# Patient Record
Sex: FEMALE | Race: WHITE | NOT HISPANIC OR LATINO | Employment: OTHER | URBAN - METROPOLITAN AREA
[De-identification: names, ages, dates, MRNs, and addresses within clinical notes are randomized per-mention and may not be internally consistent; named-entity substitution may affect disease eponyms.]

---

## 2021-01-01 ENCOUNTER — APPOINTMENT (EMERGENCY)
Dept: RADIOLOGY | Facility: HOSPITAL | Age: 86
DRG: 193 | End: 2021-01-01
Payer: COMMERCIAL

## 2021-01-01 ENCOUNTER — APPOINTMENT (INPATIENT)
Dept: RADIOLOGY | Facility: HOSPITAL | Age: 86
DRG: 193 | End: 2021-01-01
Payer: COMMERCIAL

## 2021-01-01 ENCOUNTER — HOSPITAL ENCOUNTER (INPATIENT)
Facility: HOSPITAL | Age: 86
LOS: 1 days | Discharge: HOME HEALTH | DRG: 193 | End: 2021-10-08
Attending: EMERGENCY MEDICINE | Admitting: INTERNAL MEDICINE
Payer: COMMERCIAL

## 2021-01-01 ENCOUNTER — APPOINTMENT (INPATIENT)
Dept: NON INVASIVE DIAGNOSTICS | Facility: HOSPITAL | Age: 86
DRG: 193 | End: 2021-01-01
Payer: COMMERCIAL

## 2021-01-01 ENCOUNTER — HOSPITAL ENCOUNTER (INPATIENT)
Facility: HOSPITAL | Age: 86
LOS: 4 days | Discharge: HOME HEALTH | DRG: 193 | End: 2021-11-12
Attending: EMERGENCY MEDICINE | Admitting: INTERNAL MEDICINE
Payer: COMMERCIAL

## 2021-01-01 VITALS
HEART RATE: 100 BPM | OXYGEN SATURATION: 92 % | DIASTOLIC BLOOD PRESSURE: 46 MMHG | SYSTOLIC BLOOD PRESSURE: 109 MMHG | TEMPERATURE: 97.8 F | WEIGHT: 90.17 LBS | RESPIRATION RATE: 18 BRPM | BODY MASS INDEX: 18.18 KG/M2 | HEIGHT: 59 IN

## 2021-01-01 VITALS
HEIGHT: 59 IN | RESPIRATION RATE: 16 BRPM | DIASTOLIC BLOOD PRESSURE: 55 MMHG | TEMPERATURE: 98.1 F | BODY MASS INDEX: 19.56 KG/M2 | OXYGEN SATURATION: 92 % | HEART RATE: 108 BPM | SYSTOLIC BLOOD PRESSURE: 109 MMHG | WEIGHT: 97 LBS

## 2021-01-01 DIAGNOSIS — E43 SEVERE PROTEIN-CALORIE MALNUTRITION (HCC): ICD-10-CM

## 2021-01-01 DIAGNOSIS — J18.9 LEFT LOWER LOBE PNEUMONIA: Primary | ICD-10-CM

## 2021-01-01 DIAGNOSIS — C85.90 LYMPHOMA, UNSPECIFIED BODY REGION, UNSPECIFIED LYMPHOMA TYPE (HCC): ICD-10-CM

## 2021-01-01 DIAGNOSIS — R09.02 HYPOXIA: ICD-10-CM

## 2021-01-01 DIAGNOSIS — E46 PROTEIN CALORIE MALNUTRITION (HCC): ICD-10-CM

## 2021-01-01 DIAGNOSIS — L89.152 DECUBITUS ULCER OF SACRAL REGION, STAGE 2 (HCC): ICD-10-CM

## 2021-01-01 DIAGNOSIS — J18.9 BILATERAL PNEUMONIA: Primary | ICD-10-CM

## 2021-01-01 LAB
2HR DELTA HS TROPONIN: 0 NG/L
4HR DELTA HS TROPONIN: -1 NG/L
ALBUMIN SERPL BCP-MCNC: 1.6 G/DL (ref 3.5–5)
ALBUMIN SERPL BCP-MCNC: 1.7 G/DL (ref 3.5–5)
ALBUMIN SERPL BCP-MCNC: 1.8 G/DL (ref 3.5–5)
ALBUMIN SERPL BCP-MCNC: 2.1 G/DL (ref 3.5–5)
ALP SERPL-CCNC: 134 U/L (ref 46–116)
ALP SERPL-CCNC: 136 U/L (ref 46–116)
ALP SERPL-CCNC: 158 U/L (ref 46–116)
ALP SERPL-CCNC: 165 U/L (ref 46–116)
ALT SERPL W P-5'-P-CCNC: 22 U/L (ref 12–78)
ALT SERPL W P-5'-P-CCNC: 28 U/L (ref 12–78)
ALT SERPL W P-5'-P-CCNC: 37 U/L (ref 12–78)
ALT SERPL W P-5'-P-CCNC: 49 U/L (ref 12–78)
ANION GAP SERPL CALCULATED.3IONS-SCNC: 10 MMOL/L (ref 4–13)
ANION GAP SERPL CALCULATED.3IONS-SCNC: 4 MMOL/L (ref 4–13)
ANION GAP SERPL CALCULATED.3IONS-SCNC: 5 MMOL/L (ref 4–13)
ANION GAP SERPL CALCULATED.3IONS-SCNC: 6 MMOL/L (ref 4–13)
ANION GAP SERPL CALCULATED.3IONS-SCNC: 6 MMOL/L (ref 4–13)
ANION GAP SERPL CALCULATED.3IONS-SCNC: 7 MMOL/L (ref 4–13)
ANION GAP SERPL CALCULATED.3IONS-SCNC: 9 MMOL/L (ref 4–13)
ANISOCYTOSIS BLD QL SMEAR: PRESENT
ANISOCYTOSIS BLD QL SMEAR: PRESENT
AORTIC ROOT: 3.2 CM
AORTIC VALVE MEAN VELOCITY: 11.3 M/S
APICAL FOUR CHAMBER EJECTION FRACTION: 56 %
APTT PPP: 32 SECONDS (ref 23–37)
APTT PPP: 37 SECONDS (ref 23–37)
AST SERPL W P-5'-P-CCNC: 26 U/L (ref 5–45)
AST SERPL W P-5'-P-CCNC: 32 U/L (ref 5–45)
AST SERPL W P-5'-P-CCNC: 47 U/L (ref 5–45)
AST SERPL W P-5'-P-CCNC: 55 U/L (ref 5–45)
ATRIAL RATE: 104 BPM
ATRIAL RATE: 104 BPM
ATRIAL RATE: 119 BPM
AV LVOT MEAN GRADIENT: 1 MMHG
AV LVOT PEAK GRADIENT: 3 MMHG
AV MEAN GRADIENT: 6 MMHG
AV PEAK GRADIENT: 11 MMHG
BACTERIA BLD CULT: NORMAL
BASOPHILS # BLD MANUAL: 0 THOUSAND/UL (ref 0–0.1)
BASOPHILS NFR MAR MANUAL: 0 % (ref 0–1)
BILIRUB SERPL-MCNC: 0.2 MG/DL (ref 0.2–1)
BILIRUB SERPL-MCNC: 0.2 MG/DL (ref 0.2–1)
BILIRUB SERPL-MCNC: 0.3 MG/DL (ref 0.2–1)
BILIRUB SERPL-MCNC: 0.4 MG/DL (ref 0.2–1)
BLASTS NFR BLD MANUAL: 1 %
BUN SERPL-MCNC: 10 MG/DL (ref 5–25)
BUN SERPL-MCNC: 10 MG/DL (ref 5–25)
BUN SERPL-MCNC: 12 MG/DL (ref 5–25)
BUN SERPL-MCNC: 14 MG/DL (ref 5–25)
BUN SERPL-MCNC: 17 MG/DL (ref 5–25)
CALCIUM ALBUM COR SERPL-MCNC: 8.5 MG/DL (ref 8.3–10.1)
CALCIUM ALBUM COR SERPL-MCNC: 8.7 MG/DL (ref 8.3–10.1)
CALCIUM ALBUM COR SERPL-MCNC: 9.3 MG/DL (ref 8.3–10.1)
CALCIUM ALBUM COR SERPL-MCNC: 9.4 MG/DL (ref 8.3–10.1)
CALCIUM SERPL-MCNC: 6.7 MG/DL (ref 8.3–10.1)
CALCIUM SERPL-MCNC: 7.2 MG/DL (ref 8.3–10.1)
CALCIUM SERPL-MCNC: 7.4 MG/DL (ref 8.3–10.1)
CALCIUM SERPL-MCNC: 7.6 MG/DL (ref 8.3–10.1)
CALCIUM SERPL-MCNC: 7.6 MG/DL (ref 8.3–10.1)
CALCIUM SERPL-MCNC: 7.7 MG/DL (ref 8.3–10.1)
CALCIUM SERPL-MCNC: 7.9 MG/DL (ref 8.3–10.1)
CARDIAC TROPONIN I PNL SERPL HS: 7 NG/L
CARDIAC TROPONIN I PNL SERPL HS: 8 NG/L
CARDIAC TROPONIN I PNL SERPL HS: 8 NG/L
CHLORIDE SERPL-SCNC: 100 MMOL/L (ref 100–108)
CHLORIDE SERPL-SCNC: 102 MMOL/L (ref 100–108)
CHLORIDE SERPL-SCNC: 103 MMOL/L (ref 100–108)
CHLORIDE SERPL-SCNC: 105 MMOL/L (ref 100–108)
CHLORIDE SERPL-SCNC: 99 MMOL/L (ref 100–108)
CO2 SERPL-SCNC: 25 MMOL/L (ref 21–32)
CO2 SERPL-SCNC: 25 MMOL/L (ref 21–32)
CO2 SERPL-SCNC: 26 MMOL/L (ref 21–32)
CO2 SERPL-SCNC: 28 MMOL/L (ref 21–32)
CO2 SERPL-SCNC: 29 MMOL/L (ref 21–32)
CREAT SERPL-MCNC: 0.38 MG/DL (ref 0.6–1.3)
CREAT SERPL-MCNC: 0.39 MG/DL (ref 0.6–1.3)
CREAT SERPL-MCNC: 0.4 MG/DL (ref 0.6–1.3)
CREAT SERPL-MCNC: 0.42 MG/DL (ref 0.6–1.3)
CREAT SERPL-MCNC: 0.47 MG/DL (ref 0.6–1.3)
CREAT SERPL-MCNC: 0.49 MG/DL (ref 0.6–1.3)
CREAT SERPL-MCNC: 0.54 MG/DL (ref 0.6–1.3)
DIFFERENTIAL COMMENT: ABNORMAL
DOP CALC AO VTI: 31 CM
DOP CALC LVOT PEAK VEL VTI: 14.9 CM
DOP CALC LVOT PEAK VEL: 0.82 M/S
E WAVE DECELERATION TIME: 187 MS
EOSINOPHIL # BLD MANUAL: 0 THOUSAND/UL (ref 0–0.4)
EOSINOPHIL NFR BLD MANUAL: 0 % (ref 0–6)
ERYTHROCYTE [DISTWIDTH] IN BLOOD BY AUTOMATED COUNT: 17.5 % (ref 11.6–15.1)
ERYTHROCYTE [DISTWIDTH] IN BLOOD BY AUTOMATED COUNT: 17.5 % (ref 11.6–15.1)
ERYTHROCYTE [DISTWIDTH] IN BLOOD BY AUTOMATED COUNT: 19.2 % (ref 11.6–15.1)
ERYTHROCYTE [DISTWIDTH] IN BLOOD BY AUTOMATED COUNT: 19.3 % (ref 11.6–15.1)
ERYTHROCYTE [DISTWIDTH] IN BLOOD BY AUTOMATED COUNT: 19.5 % (ref 11.6–15.1)
ERYTHROCYTE [DISTWIDTH] IN BLOOD BY AUTOMATED COUNT: 19.6 % (ref 11.6–15.1)
ERYTHROCYTE [DISTWIDTH] IN BLOOD BY AUTOMATED COUNT: 19.7 % (ref 11.6–15.1)
FERRITIN SERPL-MCNC: 458 NG/ML (ref 8–388)
FLUAV RNA RESP QL NAA+PROBE: NEGATIVE
FLUBV RNA RESP QL NAA+PROBE: NEGATIVE
FRACTIONAL SHORTENING: 35 % (ref 28–44)
GFR SERPL CREATININE-BSD FRML MDRD: 85 ML/MIN/1.73SQ M
GFR SERPL CREATININE-BSD FRML MDRD: 88 ML/MIN/1.73SQ M
GFR SERPL CREATININE-BSD FRML MDRD: 89 ML/MIN/1.73SQ M
GFR SERPL CREATININE-BSD FRML MDRD: 92 ML/MIN/1.73SQ M
GFR SERPL CREATININE-BSD FRML MDRD: 94 ML/MIN/1.73SQ M
GFR SERPL CREATININE-BSD FRML MDRD: 95 ML/MIN/1.73SQ M
GFR SERPL CREATININE-BSD FRML MDRD: 96 ML/MIN/1.73SQ M
GLUCOSE SERPL-MCNC: 106 MG/DL (ref 65–140)
GLUCOSE SERPL-MCNC: 107 MG/DL (ref 65–140)
GLUCOSE SERPL-MCNC: 80 MG/DL (ref 65–140)
GLUCOSE SERPL-MCNC: 80 MG/DL (ref 65–140)
GLUCOSE SERPL-MCNC: 85 MG/DL (ref 65–140)
GLUCOSE SERPL-MCNC: 87 MG/DL (ref 65–140)
GLUCOSE SERPL-MCNC: 89 MG/DL (ref 65–140)
HCT VFR BLD AUTO: 25.8 % (ref 34.8–46.1)
HCT VFR BLD AUTO: 26.2 % (ref 34.8–46.1)
HCT VFR BLD AUTO: 26.8 % (ref 34.8–46.1)
HCT VFR BLD AUTO: 26.9 % (ref 34.8–46.1)
HCT VFR BLD AUTO: 27.1 % (ref 34.8–46.1)
HCT VFR BLD AUTO: 27.7 % (ref 34.8–46.1)
HCT VFR BLD AUTO: 32.4 % (ref 34.8–46.1)
HEMOCCULT STL QL: NEGATIVE
HGB BLD-MCNC: 10.1 G/DL (ref 11.5–15.4)
HGB BLD-MCNC: 7.9 G/DL (ref 11.5–15.4)
HGB BLD-MCNC: 8 G/DL (ref 11.5–15.4)
HGB BLD-MCNC: 8.1 G/DL (ref 11.5–15.4)
HGB BLD-MCNC: 8.1 G/DL (ref 11.5–15.4)
HGB BLD-MCNC: 8.4 G/DL (ref 11.5–15.4)
HGB BLD-MCNC: 8.6 G/DL (ref 11.5–15.4)
INR PPP: 0.98 (ref 0.84–1.19)
INR PPP: 1.04 (ref 0.84–1.19)
INTERVENTRICULAR SEPTUM IN DIASTOLE (PARASTERNAL SHORT AXIS VIEW): 0.6 CM
IRON SATN MFR SERPL: 20 % (ref 15–50)
IRON SERPL-MCNC: 31 UG/DL (ref 50–170)
L PNEUMO1 AG UR QL IA.RAPID: NEGATIVE
LA/AORTA RATIO 2D: 0.91
LAAS-AP2: 17.1 CM2
LAAS-AP4: 13 CM2
LACTATE SERPL-SCNC: 1.1 MMOL/L (ref 0.5–2)
LACTATE SERPL-SCNC: 1.7 MMOL/L (ref 0.5–2)
LEFT INTERNAL DIMENSION IN SYSTOLE: 2.4 CM (ref 2.1–4)
LEFT VENTRICULAR INTERNAL DIMENSION IN DIASTOLE: 3.7 CM (ref 3.53–5.26)
LEFT VENTRICULAR POSTERIOR WALL IN END DIASTOLE: 0.5 CM
LEFT VENTRICULAR STROKE VOLUME: 37 ML
LYMPHOCYTES # BLD AUTO: 17.21 THOUSAND/UL (ref 0.6–4.47)
LYMPHOCYTES # BLD AUTO: 17.64 THOUSAND/UL (ref 0.6–4.47)
LYMPHOCYTES # BLD AUTO: 21.17 THOUSAND/UL (ref 0.6–4.47)
LYMPHOCYTES # BLD AUTO: 24.21 THOUSAND/UL (ref 0.6–4.47)
LYMPHOCYTES # BLD AUTO: 70 % (ref 14–44)
LYMPHOCYTES # BLD AUTO: 72 % (ref 14–44)
LYMPHOCYTES # BLD AUTO: 77 % (ref 14–44)
LYMPHOCYTES # BLD AUTO: 80 % (ref 14–44)
MCH RBC QN AUTO: 27.6 PG (ref 26.8–34.3)
MCH RBC QN AUTO: 27.7 PG (ref 26.8–34.3)
MCH RBC QN AUTO: 27.9 PG (ref 26.8–34.3)
MCH RBC QN AUTO: 28.1 PG (ref 26.8–34.3)
MCH RBC QN AUTO: 28.1 PG (ref 26.8–34.3)
MCH RBC QN AUTO: 28.2 PG (ref 26.8–34.3)
MCH RBC QN AUTO: 28.6 PG (ref 26.8–34.3)
MCHC RBC AUTO-ENTMCNC: 29.9 G/DL (ref 31.4–37.4)
MCHC RBC AUTO-ENTMCNC: 30.1 G/DL (ref 31.4–37.4)
MCHC RBC AUTO-ENTMCNC: 30.5 G/DL (ref 31.4–37.4)
MCHC RBC AUTO-ENTMCNC: 30.6 G/DL (ref 31.4–37.4)
MCHC RBC AUTO-ENTMCNC: 31 G/DL (ref 31.4–37.4)
MCHC RBC AUTO-ENTMCNC: 31.2 G/DL (ref 31.4–37.4)
MCHC RBC AUTO-ENTMCNC: 31.3 G/DL (ref 31.4–37.4)
MCV RBC AUTO: 89 FL (ref 82–98)
MCV RBC AUTO: 90 FL (ref 82–98)
MCV RBC AUTO: 92 FL (ref 82–98)
MCV RBC AUTO: 93 FL (ref 82–98)
MONOCYTES # BLD AUTO: 0 THOUSAND/UL (ref 0–1.22)
MONOCYTES # BLD AUTO: 0 THOUSAND/UL (ref 0–1.22)
MONOCYTES # BLD AUTO: 0.67 THOUSAND/UL (ref 0–1.22)
MONOCYTES # BLD AUTO: 0.82 THOUSAND/UL (ref 0–1.22)
MONOCYTES NFR BLD: 0 % (ref 4–12)
MONOCYTES NFR BLD: 0 % (ref 4–12)
MONOCYTES NFR BLD: 2 % (ref 4–12)
MONOCYTES NFR BLD: 3 % (ref 4–12)
MV E'TISSUE VEL-LAT: 11 CM/S
MV E'TISSUE VEL-SEP: 6 CM/S
MV PEAK A VEL: 1.16 M/S
MV PEAK E VEL: 83 CM/S
MV STENOSIS PRESSURE HALF TIME: 0 MS
NEUTROPHILS # BLD MANUAL: 4.37 THOUSAND/UL (ref 1.85–7.62)
NEUTROPHILS # BLD MANUAL: 4.41 THOUSAND/UL (ref 1.85–7.62)
NEUTROPHILS # BLD MANUAL: 5.22 THOUSAND/UL (ref 1.85–7.62)
NEUTROPHILS # BLD MANUAL: 6.39 THOUSAND/UL (ref 1.85–7.62)
NEUTS BAND NFR BLD MANUAL: 1 % (ref 0–8)
NEUTS SEG NFR BLD AUTO: 13 % (ref 43–75)
NEUTS SEG NFR BLD AUTO: 18 % (ref 43–75)
NEUTS SEG NFR BLD AUTO: 19 % (ref 43–75)
NEUTS SEG NFR BLD AUTO: 25 % (ref 43–75)
NRBC BLD AUTO-RTO: 0 /100 WBCS
NRBC BLD AUTO-RTO: 0 /100 WBCS
NT-PROBNP SERPL-MCNC: 1123 PG/ML
OVALOCYTES BLD QL SMEAR: PRESENT
P AXIS: 105 DEGREES
P AXIS: 77 DEGREES
P AXIS: 77 DEGREES
PATHOLOGIST INTERPRETATION: NORMAL
PATHOLOGY REVIEW: YES
PLATELET # BLD AUTO: 402 THOUSANDS/UL (ref 149–390)
PLATELET # BLD AUTO: 413 THOUSANDS/UL (ref 149–390)
PLATELET # BLD AUTO: 440 THOUSANDS/UL (ref 149–390)
PLATELET # BLD AUTO: 463 THOUSANDS/UL (ref 149–390)
PLATELET # BLD AUTO: 464 THOUSANDS/UL (ref 149–390)
PLATELET # BLD AUTO: 478 THOUSANDS/UL (ref 149–390)
PLATELET # BLD AUTO: 502 THOUSANDS/UL (ref 149–390)
PLATELET # BLD AUTO: 503 THOUSANDS/UL (ref 149–390)
PLATELET BLD QL SMEAR: ABNORMAL
PLATELET BLD QL SMEAR: ADEQUATE
PMV BLD AUTO: 8.4 FL (ref 8.9–12.7)
PMV BLD AUTO: 8.5 FL (ref 8.9–12.7)
PMV BLD AUTO: 8.5 FL (ref 8.9–12.7)
PMV BLD AUTO: 8.6 FL (ref 8.9–12.7)
PMV BLD AUTO: 8.7 FL (ref 8.9–12.7)
PMV BLD AUTO: 8.7 FL (ref 8.9–12.7)
PMV BLD AUTO: 8.8 FL (ref 8.9–12.7)
PMV BLD AUTO: 9.1 FL (ref 8.9–12.7)
POIKILOCYTOSIS BLD QL SMEAR: PRESENT
POLYCHROMASIA BLD QL SMEAR: PRESENT
POTASSIUM SERPL-SCNC: 3.4 MMOL/L (ref 3.5–5.3)
POTASSIUM SERPL-SCNC: 3.7 MMOL/L (ref 3.5–5.3)
POTASSIUM SERPL-SCNC: 3.8 MMOL/L (ref 3.5–5.3)
POTASSIUM SERPL-SCNC: 3.9 MMOL/L (ref 3.5–5.3)
POTASSIUM SERPL-SCNC: 3.9 MMOL/L (ref 3.5–5.3)
POTASSIUM SERPL-SCNC: 4.2 MMOL/L (ref 3.5–5.3)
POTASSIUM SERPL-SCNC: 4.4 MMOL/L (ref 3.5–5.3)
PR INTERVAL: 156 MS
PR INTERVAL: 156 MS
PR INTERVAL: 158 MS
PROCALCITONIN SERPL-MCNC: 0.15 NG/ML
PROCALCITONIN SERPL-MCNC: 0.17 NG/ML
PROCALCITONIN SERPL-MCNC: <0.05 NG/ML
PROT SERPL-MCNC: 3.8 G/DL (ref 6.4–8.2)
PROT SERPL-MCNC: 4.4 G/DL (ref 6.4–8.2)
PROT SERPL-MCNC: 4.6 G/DL (ref 6.4–8.2)
PROT SERPL-MCNC: 4.9 G/DL (ref 6.4–8.2)
PROTHROMBIN TIME: 12.9 SECONDS (ref 11.6–14.5)
PROTHROMBIN TIME: 13.5 SECONDS (ref 11.6–14.5)
QRS AXIS: 107 DEGREES
QRS AXIS: 74 DEGREES
QRS AXIS: 81 DEGREES
QRSD INTERVAL: 70 MS
QRSD INTERVAL: 76 MS
QRSD INTERVAL: 80 MS
QT INTERVAL: 304 MS
QT INTERVAL: 336 MS
QT INTERVAL: 338 MS
QTC INTERVAL: 427 MS
QTC INTERVAL: 441 MS
QTC INTERVAL: 444 MS
RA PRESSURE ESTIMATED: 3 MMHG
RBC # BLD AUTO: 2.81 MILLION/UL (ref 3.81–5.12)
RBC # BLD AUTO: 2.85 MILLION/UL (ref 3.81–5.12)
RBC # BLD AUTO: 2.9 MILLION/UL (ref 3.81–5.12)
RBC # BLD AUTO: 2.92 MILLION/UL (ref 3.81–5.12)
RBC # BLD AUTO: 2.98 MILLION/UL (ref 3.81–5.12)
RBC # BLD AUTO: 3.01 MILLION/UL (ref 3.81–5.12)
RBC # BLD AUTO: 3.66 MILLION/UL (ref 3.81–5.12)
RBC MORPH BLD: PRESENT
RBC MORPH BLD: PRESENT
RIGHT VENTRICLE ID DIMENSION: 3.2 CM
RSV RNA RESP QL NAA+PROBE: NEGATIVE
RV PSP: 23 MMHG
S PNEUM AG UR QL: NEGATIVE
SARS-COV-2 RNA RESP QL NAA+PROBE: NEGATIVE
SL CV LV EF: 60
SL CV PED ECHO LEFT VENTRICLE DIASTOLIC VOLUME (MOD BIPLANE) 2D: 58 ML
SL CV PED ECHO LEFT VENTRICLE SYSTOLIC VOLUME (MOD BIPLANE) 2D: 21 ML
SMUDGE CELLS BLD QL SMEAR: PRESENT
SMUDGE CELLS BLD QL SMEAR: PRESENT
SODIUM SERPL-SCNC: 133 MMOL/L (ref 136–145)
SODIUM SERPL-SCNC: 134 MMOL/L (ref 136–145)
SODIUM SERPL-SCNC: 134 MMOL/L (ref 136–145)
SODIUM SERPL-SCNC: 135 MMOL/L (ref 136–145)
SODIUM SERPL-SCNC: 135 MMOL/L (ref 136–145)
SODIUM SERPL-SCNC: 137 MMOL/L (ref 136–145)
SODIUM SERPL-SCNC: 137 MMOL/L (ref 136–145)
T WAVE AXIS: 104 DEGREES
T WAVE AXIS: 76 DEGREES
T WAVE AXIS: 77 DEGREES
TIBC SERPL-MCNC: 155 UG/DL (ref 250–450)
TOXIC GRANULES BLD QL SMEAR: PRESENT
TR MAX PG: 20 MMHG
TRICUSPID VALVE PEAK REGURGITATION VELOCITY: 2.21 M/S
TRICUSPID VALVE S': 52 CM/S
TROPONIN I SERPL-MCNC: <0.02 NG/ML
TSH SERPL DL<=0.05 MIU/L-ACNC: 2.75 UIU/ML (ref 0.36–3.74)
VARIANT LYMPHS # BLD AUTO: 1 %
VARIANT LYMPHS # BLD AUTO: 12 %
VARIANT LYMPHS # BLD AUTO: 4 %
VENTRICULAR RATE: 104 BPM
VENTRICULAR RATE: 104 BPM
VENTRICULAR RATE: 119 BPM
WBC # BLD AUTO: 17.14 THOUSAND/UL (ref 4.31–10.16)
WBC # BLD AUTO: 18.9 THOUSAND/UL (ref 4.31–10.16)
WBC # BLD AUTO: 22.05 THOUSAND/UL (ref 4.31–10.16)
WBC # BLD AUTO: 24.58 THOUSAND/UL (ref 4.31–10.16)
WBC # BLD AUTO: 25.52 THOUSAND/UL (ref 4.31–10.16)
WBC # BLD AUTO: 27.49 THOUSAND/UL (ref 4.31–10.16)
WBC # BLD AUTO: 33.63 THOUSAND/UL (ref 4.31–10.16)
Z-SCORE OF LEFT VENTRICULAR DIMENSION IN END SYSTOLE: -1.53

## 2021-01-01 PROCEDURE — 90662 IIV NO PRSV INCREASED AG IM: CPT | Performed by: PHYSICIAN ASSISTANT

## 2021-01-01 PROCEDURE — 99223 1ST HOSP IP/OBS HIGH 75: CPT | Performed by: INTERNAL MEDICINE

## 2021-01-01 PROCEDURE — 97167 OT EVAL HIGH COMPLEX 60 MIN: CPT

## 2021-01-01 PROCEDURE — 85049 AUTOMATED PLATELET COUNT: CPT | Performed by: INTERNAL MEDICINE

## 2021-01-01 PROCEDURE — 85027 COMPLETE CBC AUTOMATED: CPT | Performed by: EMERGENCY MEDICINE

## 2021-01-01 PROCEDURE — 93010 ELECTROCARDIOGRAM REPORT: CPT | Performed by: INTERNAL MEDICINE

## 2021-01-01 PROCEDURE — 80053 COMPREHEN METABOLIC PANEL: CPT | Performed by: PHYSICIAN ASSISTANT

## 2021-01-01 PROCEDURE — 85610 PROTHROMBIN TIME: CPT | Performed by: EMERGENCY MEDICINE

## 2021-01-01 PROCEDURE — 85027 COMPLETE CBC AUTOMATED: CPT | Performed by: HOSPITALIST

## 2021-01-01 PROCEDURE — 80048 BASIC METABOLIC PNL TOTAL CA: CPT | Performed by: HOSPITALIST

## 2021-01-01 PROCEDURE — 36415 COLL VENOUS BLD VENIPUNCTURE: CPT | Performed by: EMERGENCY MEDICINE

## 2021-01-01 PROCEDURE — 97163 PT EVAL HIGH COMPLEX 45 MIN: CPT

## 2021-01-01 PROCEDURE — 85025 COMPLETE CBC W/AUTO DIFF WBC: CPT | Performed by: HOSPITALIST

## 2021-01-01 PROCEDURE — 80053 COMPREHEN METABOLIC PANEL: CPT | Performed by: INTERNAL MEDICINE

## 2021-01-01 PROCEDURE — 85730 THROMBOPLASTIN TIME PARTIAL: CPT | Performed by: EMERGENCY MEDICINE

## 2021-01-01 PROCEDURE — 83605 ASSAY OF LACTIC ACID: CPT | Performed by: EMERGENCY MEDICINE

## 2021-01-01 PROCEDURE — 93306 TTE W/DOPPLER COMPLETE: CPT | Performed by: INTERNAL MEDICINE

## 2021-01-01 PROCEDURE — G0008 ADMIN INFLUENZA VIRUS VAC: HCPCS | Performed by: PHYSICIAN ASSISTANT

## 2021-01-01 PROCEDURE — 84145 PROCALCITONIN (PCT): CPT | Performed by: EMERGENCY MEDICINE

## 2021-01-01 PROCEDURE — 93005 ELECTROCARDIOGRAM TRACING: CPT

## 2021-01-01 PROCEDURE — 99291 CRITICAL CARE FIRST HOUR: CPT | Performed by: EMERGENCY MEDICINE

## 2021-01-01 PROCEDURE — 84484 ASSAY OF TROPONIN QUANT: CPT | Performed by: INTERNAL MEDICINE

## 2021-01-01 PROCEDURE — 94664 DEMO&/EVAL PT USE INHALER: CPT

## 2021-01-01 PROCEDURE — 85027 COMPLETE CBC AUTOMATED: CPT | Performed by: PHYSICIAN ASSISTANT

## 2021-01-01 PROCEDURE — 0241U HB NFCT DS VIR RESP RNA 4 TRGT: CPT | Performed by: EMERGENCY MEDICINE

## 2021-01-01 PROCEDURE — 71045 X-RAY EXAM CHEST 1 VIEW: CPT

## 2021-01-01 PROCEDURE — 99291 CRITICAL CARE FIRST HOUR: CPT

## 2021-01-01 PROCEDURE — 85007 BL SMEAR W/DIFF WBC COUNT: CPT | Performed by: EMERGENCY MEDICINE

## 2021-01-01 PROCEDURE — 84145 PROCALCITONIN (PCT): CPT | Performed by: INTERNAL MEDICINE

## 2021-01-01 PROCEDURE — 85027 COMPLETE CBC AUTOMATED: CPT | Performed by: INTERNAL MEDICINE

## 2021-01-01 PROCEDURE — 82728 ASSAY OF FERRITIN: CPT | Performed by: PHYSICIAN ASSISTANT

## 2021-01-01 PROCEDURE — 84484 ASSAY OF TROPONIN QUANT: CPT | Performed by: EMERGENCY MEDICINE

## 2021-01-01 PROCEDURE — 71046 X-RAY EXAM CHEST 2 VIEWS: CPT

## 2021-01-01 PROCEDURE — 96365 THER/PROPH/DIAG IV INF INIT: CPT

## 2021-01-01 PROCEDURE — 99233 SBSQ HOSP IP/OBS HIGH 50: CPT | Performed by: HOSPITALIST

## 2021-01-01 PROCEDURE — 84443 ASSAY THYROID STIM HORMONE: CPT | Performed by: PHYSICIAN ASSISTANT

## 2021-01-01 PROCEDURE — 80053 COMPREHEN METABOLIC PANEL: CPT | Performed by: EMERGENCY MEDICINE

## 2021-01-01 PROCEDURE — 99284 EMERGENCY DEPT VISIT MOD MDM: CPT | Performed by: EMERGENCY MEDICINE

## 2021-01-01 PROCEDURE — 99236 HOSP IP/OBS SAME DATE HI 85: CPT | Performed by: INTERNAL MEDICINE

## 2021-01-01 PROCEDURE — 99239 HOSP IP/OBS DSCHRG MGMT >30: CPT | Performed by: HOSPITALIST

## 2021-01-01 PROCEDURE — 99232 SBSQ HOSP IP/OBS MODERATE 35: CPT | Performed by: HOSPITALIST

## 2021-01-01 PROCEDURE — 93306 TTE W/DOPPLER COMPLETE: CPT

## 2021-01-01 PROCEDURE — 82272 OCCULT BLD FECES 1-3 TESTS: CPT | Performed by: INTERNAL MEDICINE

## 2021-01-01 PROCEDURE — 97530 THERAPEUTIC ACTIVITIES: CPT

## 2021-01-01 PROCEDURE — 83880 ASSAY OF NATRIURETIC PEPTIDE: CPT | Performed by: EMERGENCY MEDICINE

## 2021-01-01 PROCEDURE — 83540 ASSAY OF IRON: CPT | Performed by: PHYSICIAN ASSISTANT

## 2021-01-01 PROCEDURE — 99285 EMERGENCY DEPT VISIT HI MDM: CPT

## 2021-01-01 PROCEDURE — 85007 BL SMEAR W/DIFF WBC COUNT: CPT | Performed by: PHYSICIAN ASSISTANT

## 2021-01-01 PROCEDURE — 87040 BLOOD CULTURE FOR BACTERIA: CPT | Performed by: EMERGENCY MEDICINE

## 2021-01-01 PROCEDURE — 94760 N-INVAS EAR/PLS OXIMETRY 1: CPT

## 2021-01-01 PROCEDURE — 85007 BL SMEAR W/DIFF WBC COUNT: CPT | Performed by: INTERNAL MEDICINE

## 2021-01-01 PROCEDURE — 36415 COLL VENOUS BLD VENIPUNCTURE: CPT | Performed by: INTERNAL MEDICINE

## 2021-01-01 PROCEDURE — 82272 OCCULT BLD FECES 1-3 TESTS: CPT

## 2021-01-01 PROCEDURE — 87449 NOS EACH ORGANISM AG IA: CPT | Performed by: EMERGENCY MEDICINE

## 2021-01-01 PROCEDURE — 83550 IRON BINDING TEST: CPT | Performed by: PHYSICIAN ASSISTANT

## 2021-01-01 RX ORDER — GUAIFENESIN 600 MG
600 TABLET, EXTENDED RELEASE 12 HR ORAL 2 TIMES DAILY
Status: DISCONTINUED | OUTPATIENT
Start: 2021-01-01 | End: 2021-01-01 | Stop reason: HOSPADM

## 2021-01-01 RX ORDER — LEVALBUTEROL 1.25 MG/.5ML
1.25 SOLUTION, CONCENTRATE RESPIRATORY (INHALATION) EVERY 6 HOURS PRN
Status: DISCONTINUED | OUTPATIENT
Start: 2021-01-01 | End: 2021-01-01 | Stop reason: HOSPADM

## 2021-01-01 RX ORDER — CEFTRIAXONE 1 G/50ML
1000 INJECTION, SOLUTION INTRAVENOUS EVERY 24 HOURS
Status: DISCONTINUED | OUTPATIENT
Start: 2021-01-01 | End: 2021-01-01

## 2021-01-01 RX ORDER — ACETAMINOPHEN 325 MG/1
650 TABLET ORAL EVERY 6 HOURS PRN
Status: DISCONTINUED | OUTPATIENT
Start: 2021-01-01 | End: 2021-01-01 | Stop reason: HOSPADM

## 2021-01-01 RX ORDER — CEFTRIAXONE 1 G/50ML
1000 INJECTION, SOLUTION INTRAVENOUS ONCE
Status: COMPLETED | OUTPATIENT
Start: 2021-01-01 | End: 2021-01-01

## 2021-01-01 RX ORDER — CEFTRIAXONE 2 G/50ML
2000 INJECTION, SOLUTION INTRAVENOUS ONCE
Status: COMPLETED | OUTPATIENT
Start: 2021-01-01 | End: 2021-01-01

## 2021-01-01 RX ORDER — VANCOMYCIN HYDROCHLORIDE 500 MG/100ML
500 INJECTION, SOLUTION INTRAVENOUS EVERY 12 HOURS
Status: DISCONTINUED | OUTPATIENT
Start: 2021-01-01 | End: 2021-01-01

## 2021-01-01 RX ORDER — MULTIVITAMIN/IRON/FOLIC ACID 18MG-0.4MG
1 TABLET ORAL DAILY
Status: DISCONTINUED | OUTPATIENT
Start: 2021-01-01 | End: 2021-01-01 | Stop reason: HOSPADM

## 2021-01-01 RX ORDER — CALCIUM CARBONATE 200(500)MG
1000 TABLET,CHEWABLE ORAL DAILY PRN
Status: DISCONTINUED | OUTPATIENT
Start: 2021-01-01 | End: 2021-01-01 | Stop reason: HOSPADM

## 2021-01-01 RX ORDER — CEFDINIR 300 MG/1
300 CAPSULE ORAL EVERY 12 HOURS SCHEDULED
Status: DISCONTINUED | OUTPATIENT
Start: 2021-01-01 | End: 2021-01-01 | Stop reason: HOSPADM

## 2021-01-01 RX ORDER — MULTIVITAMIN
1 CAPSULE ORAL DAILY
COMMUNITY

## 2021-01-01 RX ORDER — LEVOTHYROXINE SODIUM 0.07 MG/1
37.5 TABLET ORAL
Status: DISCONTINUED | OUTPATIENT
Start: 2021-01-01 | End: 2021-01-01 | Stop reason: HOSPADM

## 2021-01-01 RX ORDER — B-COMPLEX WITH VITAMIN C
500 TABLET ORAL
COMMUNITY

## 2021-01-01 RX ORDER — POTASSIUM CHLORIDE 20 MEQ/1
20 TABLET, EXTENDED RELEASE ORAL ONCE
Status: COMPLETED | OUTPATIENT
Start: 2021-01-01 | End: 2021-01-01

## 2021-01-01 RX ORDER — SODIUM CHLORIDE FOR INHALATION 0.9 %
3 VIAL, NEBULIZER (ML) INHALATION EVERY 6 HOURS PRN
Status: DISCONTINUED | OUTPATIENT
Start: 2021-01-01 | End: 2021-01-01 | Stop reason: HOSPADM

## 2021-01-01 RX ORDER — ATORVASTATIN CALCIUM 20 MG/1
20 TABLET, FILM COATED ORAL
COMMUNITY
Start: 2021-01-01

## 2021-01-01 RX ORDER — AMOXICILLIN 250 MG
1 CAPSULE ORAL DAILY
Status: DISCONTINUED | OUTPATIENT
Start: 2021-01-01 | End: 2021-01-01 | Stop reason: HOSPADM

## 2021-01-01 RX ORDER — LEVOTHYROXINE SODIUM 0.03 MG/1
25 TABLET ORAL
Status: DISCONTINUED | OUTPATIENT
Start: 2021-01-01 | End: 2021-01-01 | Stop reason: HOSPADM

## 2021-01-01 RX ORDER — MIRTAZAPINE 7.5 MG/1
15 TABLET, FILM COATED ORAL
COMMUNITY

## 2021-01-01 RX ORDER — AMOXICILLIN 250 MG
1 CAPSULE ORAL DAILY
COMMUNITY

## 2021-01-01 RX ORDER — ONDANSETRON 2 MG/ML
4 INJECTION INTRAMUSCULAR; INTRAVENOUS EVERY 6 HOURS PRN
Status: DISCONTINUED | OUTPATIENT
Start: 2021-01-01 | End: 2021-01-01 | Stop reason: HOSPADM

## 2021-01-01 RX ORDER — ESCITALOPRAM OXALATE 5 MG/1
5 TABLET ORAL DAILY
Status: DISCONTINUED | OUTPATIENT
Start: 2021-01-01 | End: 2021-01-01 | Stop reason: HOSPADM

## 2021-01-01 RX ORDER — CEFTRIAXONE 1 G/50ML
1000 INJECTION, SOLUTION INTRAVENOUS EVERY 24 HOURS
Status: DISCONTINUED | OUTPATIENT
Start: 2021-01-01 | End: 2021-01-01 | Stop reason: HOSPADM

## 2021-01-01 RX ORDER — SERTRALINE HYDROCHLORIDE 25 MG/1
25 TABLET, FILM COATED ORAL DAILY
COMMUNITY

## 2021-01-01 RX ORDER — MIRTAZAPINE 15 MG/1
7.5 TABLET, FILM COATED ORAL
Status: DISCONTINUED | OUTPATIENT
Start: 2021-01-01 | End: 2021-01-01 | Stop reason: HOSPADM

## 2021-01-01 RX ORDER — ATORVASTATIN CALCIUM 20 MG/1
20 TABLET, FILM COATED ORAL
Status: DISCONTINUED | OUTPATIENT
Start: 2021-01-01 | End: 2021-01-01 | Stop reason: HOSPADM

## 2021-01-01 RX ORDER — CEFEPIME HYDROCHLORIDE 2 G/50ML
2000 INJECTION, SOLUTION INTRAVENOUS EVERY 12 HOURS
Status: DISCONTINUED | OUTPATIENT
Start: 2021-01-01 | End: 2021-01-01

## 2021-01-01 RX ORDER — CEFUROXIME AXETIL 250 MG/1
250 TABLET ORAL EVERY 12 HOURS SCHEDULED
Qty: 12 TABLET | Refills: 0 | Status: SHIPPED | OUTPATIENT
Start: 2021-01-01 | End: 2021-01-01

## 2021-01-01 RX ORDER — ACETAMINOPHEN 325 MG/1
650 TABLET ORAL ONCE
Status: COMPLETED | OUTPATIENT
Start: 2021-01-01 | End: 2021-01-01

## 2021-01-01 RX ORDER — FUROSEMIDE 10 MG/ML
20 INJECTION INTRAMUSCULAR; INTRAVENOUS ONCE
Status: COMPLETED | OUTPATIENT
Start: 2021-01-01 | End: 2021-01-01

## 2021-01-01 RX ORDER — CEFDINIR 300 MG/1
300 CAPSULE ORAL EVERY 12 HOURS SCHEDULED
Qty: 10 CAPSULE | Refills: 0 | Status: SHIPPED | OUTPATIENT
Start: 2021-01-01 | End: 2021-01-01

## 2021-01-01 RX ORDER — FERROUS SULFATE 325(65) MG
325 TABLET ORAL
COMMUNITY

## 2021-01-01 RX ORDER — FERROUS SULFATE 325(65) MG
325 TABLET ORAL
Status: DISCONTINUED | OUTPATIENT
Start: 2021-01-01 | End: 2021-01-01 | Stop reason: HOSPADM

## 2021-01-01 RX ORDER — FUROSEMIDE 10 MG/ML
20 INJECTION INTRAMUSCULAR; INTRAVENOUS DAILY
Status: DISCONTINUED | OUTPATIENT
Start: 2021-01-01 | End: 2021-01-01

## 2021-01-01 RX ORDER — ESCITALOPRAM OXALATE 5 MG/1
TABLET ORAL
COMMUNITY
Start: 2021-01-01 | End: 2022-01-01

## 2021-01-01 RX ORDER — B-COMPLEX WITH VITAMIN C
1 TABLET ORAL
Status: DISCONTINUED | OUTPATIENT
Start: 2021-01-01 | End: 2021-01-01 | Stop reason: HOSPADM

## 2021-01-01 RX ORDER — ALBUTEROL SULFATE 90 UG/1
2 AEROSOL, METERED RESPIRATORY (INHALATION) ONCE
Status: COMPLETED | OUTPATIENT
Start: 2021-01-01 | End: 2021-01-01

## 2021-01-01 RX ORDER — LEVOTHYROXINE SODIUM 0.03 MG/1
37.5 TABLET ORAL
COMMUNITY
Start: 2021-01-01

## 2021-01-01 RX ADMIN — MIRTAZAPINE 7.5 MG: 15 TABLET, FILM COATED ORAL at 21:48

## 2021-01-01 RX ADMIN — FERROUS SULFATE TAB 325 MG (65 MG ELEMENTAL FE) 325 MG: 325 (65 FE) TAB at 09:01

## 2021-01-01 RX ADMIN — GUAIFENESIN 600 MG: 600 TABLET ORAL at 18:44

## 2021-01-01 RX ADMIN — ENOXAPARIN SODIUM 40 MG: 100 INJECTION SUBCUTANEOUS at 09:51

## 2021-01-01 RX ADMIN — GUAIFENESIN 600 MG: 600 TABLET ORAL at 17:11

## 2021-01-01 RX ADMIN — FERROUS SULFATE TAB 325 MG (65 MG ELEMENTAL FE) 325 MG: 325 (65 FE) TAB at 08:12

## 2021-01-01 RX ADMIN — GUAIFENESIN 600 MG: 600 TABLET ORAL at 17:34

## 2021-01-01 RX ADMIN — ENOXAPARIN SODIUM 30 MG: 100 INJECTION SUBCUTANEOUS at 09:01

## 2021-01-01 RX ADMIN — MIRTAZAPINE 7.5 MG: 15 TABLET, FILM COATED ORAL at 21:00

## 2021-01-01 RX ADMIN — FUROSEMIDE 20 MG: 10 INJECTION, SOLUTION INTRAMUSCULAR; INTRAVENOUS at 09:51

## 2021-01-01 RX ADMIN — FUROSEMIDE 20 MG: 10 INJECTION, SOLUTION INTRAMUSCULAR; INTRAVENOUS at 20:55

## 2021-01-01 RX ADMIN — ENOXAPARIN SODIUM 40 MG: 100 INJECTION SUBCUTANEOUS at 08:11

## 2021-01-01 RX ADMIN — ATORVASTATIN CALCIUM 20 MG: 20 TABLET, FILM COATED ORAL at 16:01

## 2021-01-01 RX ADMIN — DOCUSATE SODIUM AND SENNOSIDES 1 TABLET: 8.6; 5 TABLET ORAL at 09:51

## 2021-01-01 RX ADMIN — Medication 30 MG: at 16:02

## 2021-01-01 RX ADMIN — MULTIPLE VITAMINS W/ MINERALS TAB 1 TABLET: TAB ORAL at 08:10

## 2021-01-01 RX ADMIN — Medication 1 TABLET: at 07:05

## 2021-01-01 RX ADMIN — LEVOTHYROXINE SODIUM 37.5 MCG: 75 TABLET ORAL at 05:28

## 2021-01-01 RX ADMIN — GUAIFENESIN 600 MG: 600 TABLET ORAL at 09:51

## 2021-01-01 RX ADMIN — POTASSIUM CHLORIDE 20 MEQ: 1500 TABLET, EXTENDED RELEASE ORAL at 15:35

## 2021-01-01 RX ADMIN — ACETAMINOPHEN 650 MG: 325 TABLET, FILM COATED ORAL at 22:51

## 2021-01-01 RX ADMIN — MIRTAZAPINE 7.5 MG: 15 TABLET, FILM COATED ORAL at 22:48

## 2021-01-01 RX ADMIN — ATORVASTATIN CALCIUM 20 MG: 20 TABLET, FILM COATED ORAL at 17:11

## 2021-01-01 RX ADMIN — ESCITALOPRAM 5 MG: 5 TABLET, FILM COATED ORAL at 08:13

## 2021-01-01 RX ADMIN — VANCOMYCIN HYDROCHLORIDE 500 MG: 500 INJECTION, SOLUTION INTRAVENOUS at 08:47

## 2021-01-01 RX ADMIN — ESCITALOPRAM 5 MG: 5 TABLET, FILM COATED ORAL at 09:51

## 2021-01-01 RX ADMIN — INFLUENZA A VIRUS A/VICTORIA/2570/2019 IVR-215 (H1N1) ANTIGEN (FORMALDEHYDE INACTIVATED), INFLUENZA A VIRUS A/TASMANIA/503/2020 IVR-221 (H3N2) ANTIGEN (FORMALDEHYDE INACTIVATED), INFLUENZA B VIRUS B/PHUKET/3073/2013 ANTIGEN (FORMALDEHYDE INACTIVATED), AND INFLUENZA B VIRUS B/WASHINGTON/02/2019 ANTIGEN (FORMALDEHYDE INACTIVATED) 0.7 ML: 60; 60; 60; 60 INJECTION, SUSPENSION INTRAMUSCULAR at 16:02

## 2021-01-01 RX ADMIN — SODIUM CHLORIDE 1000 ML: 0.9 INJECTION, SOLUTION INTRAVENOUS at 23:41

## 2021-01-01 RX ADMIN — DOCUSATE SODIUM AND SENNOSIDES 1 TABLET: 8.6; 5 TABLET ORAL at 09:01

## 2021-01-01 RX ADMIN — MIRTAZAPINE 7.5 MG: 15 TABLET, FILM COATED ORAL at 01:11

## 2021-01-01 RX ADMIN — GUAIFENESIN 600 MG: 600 TABLET ORAL at 08:01

## 2021-01-01 RX ADMIN — MIRTAZAPINE 7.5 MG: 15 TABLET, FILM COATED ORAL at 21:57

## 2021-01-01 RX ADMIN — CEFDINIR 300 MG: 300 CAPSULE ORAL at 13:16

## 2021-01-01 RX ADMIN — FERROUS SULFATE TAB 325 MG (65 MG ELEMENTAL FE) 325 MG: 325 (65 FE) TAB at 07:05

## 2021-01-01 RX ADMIN — CEFTRIAXONE 1000 MG: 1 INJECTION, SOLUTION INTRAVENOUS at 20:49

## 2021-01-01 RX ADMIN — LEVOTHYROXINE SODIUM 37.5 MCG: 75 TABLET ORAL at 05:26

## 2021-01-01 RX ADMIN — ALBUTEROL SULFATE 2 PUFF: 90 AEROSOL, METERED RESPIRATORY (INHALATION) at 20:19

## 2021-01-01 RX ADMIN — GUAIFENESIN 600 MG: 600 TABLET ORAL at 08:10

## 2021-01-01 RX ADMIN — Medication 1 TABLET: at 06:59

## 2021-01-01 RX ADMIN — CEFTRIAXONE 1000 MG: 1 INJECTION, SOLUTION INTRAVENOUS at 22:00

## 2021-01-01 RX ADMIN — CEFTRIAXONE 1000 MG: 1 INJECTION, SOLUTION INTRAVENOUS at 20:19

## 2021-01-01 RX ADMIN — FERROUS SULFATE TAB 325 MG (65 MG ELEMENTAL FE) 325 MG: 325 (65 FE) TAB at 05:27

## 2021-01-01 RX ADMIN — ATORVASTATIN CALCIUM 20 MG: 20 TABLET, FILM COATED ORAL at 16:39

## 2021-01-01 RX ADMIN — ENOXAPARIN SODIUM 40 MG: 100 INJECTION SUBCUTANEOUS at 08:01

## 2021-01-01 RX ADMIN — ESCITALOPRAM 5 MG: 5 TABLET, FILM COATED ORAL at 09:01

## 2021-01-01 RX ADMIN — ATORVASTATIN CALCIUM 20 MG: 20 TABLET, FILM COATED ORAL at 15:35

## 2021-01-01 RX ADMIN — DOCUSATE SODIUM AND SENNOSIDES 1 TABLET: 8.6; 5 TABLET ORAL at 08:10

## 2021-01-01 RX ADMIN — LEVOTHYROXINE SODIUM 37.5 MCG: 75 TABLET ORAL at 06:59

## 2021-01-01 RX ADMIN — Medication 1 TABLET: at 05:27

## 2021-01-01 RX ADMIN — MULTIPLE VITAMINS W/ MINERALS TAB 1 TABLET: TAB ORAL at 09:51

## 2021-01-01 RX ADMIN — FERROUS SULFATE TAB 325 MG (65 MG ELEMENTAL FE) 325 MG: 325 (65 FE) TAB at 06:59

## 2021-01-01 RX ADMIN — ENOXAPARIN SODIUM 40 MG: 100 INJECTION SUBCUTANEOUS at 08:09

## 2021-01-01 RX ADMIN — CEFTRIAXONE 2000 MG: 2 INJECTION, SOLUTION INTRAVENOUS at 22:55

## 2021-01-01 RX ADMIN — AZITHROMYCIN MONOHYDRATE 500 MG: 500 INJECTION, POWDER, LYOPHILIZED, FOR SOLUTION INTRAVENOUS at 20:55

## 2021-01-01 RX ADMIN — CEFTRIAXONE 1000 MG: 1 INJECTION, SOLUTION INTRAVENOUS at 20:01

## 2021-01-01 RX ADMIN — Medication 1 TABLET: at 08:12

## 2021-01-01 RX ADMIN — LEVOTHYROXINE SODIUM 37.5 MCG: 75 TABLET ORAL at 06:20

## 2021-01-01 RX ADMIN — Medication 30 MG: at 09:01

## 2021-01-01 RX ADMIN — ESCITALOPRAM 5 MG: 5 TABLET, FILM COATED ORAL at 08:01

## 2021-01-01 RX ADMIN — CEFEPIME HYDROCHLORIDE 2000 MG: 2 INJECTION, SOLUTION INTRAVENOUS at 08:11

## 2021-01-01 RX ADMIN — GUAIFENESIN 600 MG: 600 TABLET ORAL at 08:12

## 2021-01-01 RX ADMIN — MULTIPLE VITAMINS W/ MINERALS TAB 1 TABLET: TAB ORAL at 08:02

## 2021-01-01 RX ADMIN — LEVOTHYROXINE SODIUM 25 MCG: 25 TABLET ORAL at 06:34

## 2021-01-01 RX ADMIN — ACETAMINOPHEN 650 MG: 325 TABLET, FILM COATED ORAL at 20:19

## 2021-01-01 RX ADMIN — DOCUSATE SODIUM AND SENNOSIDES 1 TABLET: 8.6; 5 TABLET ORAL at 08:01

## 2021-01-01 RX ADMIN — MULTIPLE VITAMINS W/ MINERALS TAB 1 TABLET: TAB ORAL at 08:12

## 2021-01-01 RX ADMIN — ESCITALOPRAM 5 MG: 5 TABLET, FILM COATED ORAL at 08:10

## 2021-10-07 PROBLEM — C85.90 LYMPHOMA (HCC): Status: ACTIVE | Noted: 2021-01-01

## 2021-10-07 PROBLEM — J44.9 COPD (CHRONIC OBSTRUCTIVE PULMONARY DISEASE) (HCC): Status: ACTIVE | Noted: 2021-01-01

## 2021-10-07 PROBLEM — E78.2 MIXED HYPERLIPIDEMIA: Status: ACTIVE | Noted: 2021-01-01

## 2021-10-07 PROBLEM — J96.11 CHRONIC RESPIRATORY FAILURE WITH HYPOXIA (HCC): Status: ACTIVE | Noted: 2021-01-01

## 2021-10-07 PROBLEM — A41.9 SEPSIS WITHOUT ACUTE ORGAN DYSFUNCTION (HCC): Status: ACTIVE | Noted: 2021-01-01

## 2021-10-07 PROBLEM — E46 MALNUTRITION (HCC): Status: ACTIVE | Noted: 2021-01-01

## 2021-10-07 PROBLEM — E03.9 HYPOTHYROIDISM: Status: ACTIVE | Noted: 2021-01-01

## 2021-10-07 PROBLEM — J18.9 COMMUNITY ACQUIRED PNEUMONIA OF LEFT LOWER LOBE OF LUNG: Status: ACTIVE | Noted: 2021-01-01

## 2021-10-07 PROBLEM — E87.1 HYPONATREMIA: Status: ACTIVE | Noted: 2021-01-01

## 2021-10-07 PROBLEM — F32.A DEPRESSION: Status: ACTIVE | Noted: 2021-01-01

## 2021-10-08 PROBLEM — D64.9 ANEMIA: Status: ACTIVE | Noted: 2021-01-01

## 2021-10-08 PROBLEM — E43 SEVERE PROTEIN-CALORIE MALNUTRITION (HCC): Status: ACTIVE | Noted: 2021-01-01

## 2021-11-08 PROBLEM — R79.89 ELEVATED BRAIN NATRIURETIC PEPTIDE (BNP) LEVEL: Status: ACTIVE | Noted: 2021-01-01

## 2022-01-01 ENCOUNTER — HOSPITAL ENCOUNTER (INPATIENT)
Facility: HOSPITAL | Age: 87
LOS: 11 days | Discharge: HOSPICE | DRG: 871 | End: 2022-03-03
Attending: EMERGENCY MEDICINE | Admitting: HOSPITALIST
Payer: COMMERCIAL

## 2022-01-01 ENCOUNTER — APPOINTMENT (EMERGENCY)
Dept: RADIOLOGY | Facility: HOSPITAL | Age: 87
DRG: 871 | End: 2022-01-01
Payer: COMMERCIAL

## 2022-01-01 ENCOUNTER — TELEPHONE (OUTPATIENT)
Dept: PALLIATIVE MEDICINE | Facility: CLINIC | Age: 87
End: 2022-01-01

## 2022-01-01 ENCOUNTER — APPOINTMENT (INPATIENT)
Dept: RADIOLOGY | Facility: HOSPITAL | Age: 87
DRG: 871 | End: 2022-01-01
Payer: COMMERCIAL

## 2022-01-01 VITALS
OXYGEN SATURATION: 92 % | TEMPERATURE: 97.7 F | DIASTOLIC BLOOD PRESSURE: 57 MMHG | HEIGHT: 59 IN | WEIGHT: 97 LBS | RESPIRATION RATE: 18 BRPM | BODY MASS INDEX: 19.56 KG/M2 | HEART RATE: 102 BPM | SYSTOLIC BLOOD PRESSURE: 122 MMHG

## 2022-01-01 DIAGNOSIS — U07.1 COVID-19: ICD-10-CM

## 2022-01-01 DIAGNOSIS — R06.03 ACUTE RESPIRATORY DISTRESS: ICD-10-CM

## 2022-01-01 DIAGNOSIS — R41.82 ALTERED MENTAL STATUS: Primary | ICD-10-CM

## 2022-01-01 DIAGNOSIS — C85.90 LYMPHOMA, UNSPECIFIED BODY REGION, UNSPECIFIED LYMPHOMA TYPE (HCC): ICD-10-CM

## 2022-01-01 DIAGNOSIS — J18.9 PNEUMONIA: ICD-10-CM

## 2022-01-01 DIAGNOSIS — J96.21 ACUTE ON CHRONIC RESPIRATORY FAILURE WITH HYPOXIA (HCC): ICD-10-CM

## 2022-01-01 LAB
2HR DELTA HS TROPONIN: -3 NG/L
4HR DELTA HS TROPONIN: -3 NG/L
ALBUMIN SERPL BCP-MCNC: 1.9 G/DL (ref 3.5–5)
ALBUMIN SERPL BCP-MCNC: 1.9 G/DL (ref 3.5–5)
ALBUMIN SERPL BCP-MCNC: 2 G/DL (ref 3.5–5)
ALBUMIN SERPL BCP-MCNC: 2 G/DL (ref 3.5–5)
ALBUMIN SERPL BCP-MCNC: 2.1 G/DL (ref 3.5–5)
ALBUMIN SERPL BCP-MCNC: 2.2 G/DL (ref 3.5–5)
ALBUMIN SERPL BCP-MCNC: 2.6 G/DL (ref 3.5–5)
ALP SERPL-CCNC: 113 U/L (ref 46–116)
ALP SERPL-CCNC: 120 U/L (ref 46–116)
ALP SERPL-CCNC: 122 U/L (ref 46–116)
ALP SERPL-CCNC: 124 U/L (ref 46–116)
ALP SERPL-CCNC: 125 U/L (ref 46–116)
ALP SERPL-CCNC: 129 U/L (ref 46–116)
ALP SERPL-CCNC: 144 U/L (ref 46–116)
ALT SERPL W P-5'-P-CCNC: 50 U/L (ref 12–78)
ALT SERPL W P-5'-P-CCNC: 56 U/L (ref 12–78)
ALT SERPL W P-5'-P-CCNC: 60 U/L (ref 12–78)
ALT SERPL W P-5'-P-CCNC: 63 U/L (ref 12–78)
ALT SERPL W P-5'-P-CCNC: 65 U/L (ref 12–78)
ALT SERPL W P-5'-P-CCNC: 67 U/L (ref 12–78)
ALT SERPL W P-5'-P-CCNC: 71 U/L (ref 12–78)
ANION GAP SERPL CALCULATED.3IONS-SCNC: 11 MMOL/L (ref 4–13)
ANION GAP SERPL CALCULATED.3IONS-SCNC: 12 MMOL/L (ref 4–13)
ANION GAP SERPL CALCULATED.3IONS-SCNC: 2 MMOL/L (ref 4–13)
ANION GAP SERPL CALCULATED.3IONS-SCNC: 4 MMOL/L (ref 4–13)
ANION GAP SERPL CALCULATED.3IONS-SCNC: 4 MMOL/L (ref 4–13)
ANION GAP SERPL CALCULATED.3IONS-SCNC: 5 MMOL/L (ref 4–13)
ANION GAP SERPL CALCULATED.3IONS-SCNC: 6 MMOL/L (ref 4–13)
ANION GAP SERPL CALCULATED.3IONS-SCNC: 8 MMOL/L (ref 4–13)
ANION GAP SERPL CALCULATED.3IONS-SCNC: 9 MMOL/L (ref 4–13)
ANISOCYTOSIS BLD QL SMEAR: PRESENT
APTT PPP: 102 SECONDS (ref 23–37)
APTT PPP: 128 SECONDS (ref 23–37)
APTT PPP: 136 SECONDS (ref 23–37)
APTT PPP: 34 SECONDS (ref 23–37)
APTT PPP: 40 SECONDS (ref 23–37)
APTT PPP: 45 SECONDS (ref 23–37)
APTT PPP: 46 SECONDS (ref 23–37)
APTT PPP: 49 SECONDS (ref 23–37)
APTT PPP: 51 SECONDS (ref 23–37)
APTT PPP: 51 SECONDS (ref 23–37)
APTT PPP: 54 SECONDS (ref 23–37)
APTT PPP: 60 SECONDS (ref 23–37)
APTT PPP: 67 SECONDS (ref 23–37)
APTT PPP: 67 SECONDS (ref 23–37)
APTT PPP: 70 SECONDS (ref 23–37)
APTT PPP: 72 SECONDS (ref 23–37)
APTT PPP: 73 SECONDS (ref 23–37)
APTT PPP: 74 SECONDS (ref 23–37)
APTT PPP: 84 SECONDS (ref 23–37)
APTT PPP: 89 SECONDS (ref 23–37)
APTT PPP: 97 SECONDS (ref 23–37)
APTT PPP: 99 SECONDS (ref 23–37)
APTT PPP: >210 SECONDS (ref 23–37)
AST SERPL W P-5'-P-CCNC: 41 U/L (ref 5–45)
AST SERPL W P-5'-P-CCNC: 53 U/L (ref 5–45)
AST SERPL W P-5'-P-CCNC: 59 U/L (ref 5–45)
AST SERPL W P-5'-P-CCNC: 59 U/L (ref 5–45)
AST SERPL W P-5'-P-CCNC: 63 U/L (ref 5–45)
AST SERPL W P-5'-P-CCNC: 69 U/L (ref 5–45)
AST SERPL W P-5'-P-CCNC: 72 U/L (ref 5–45)
ATRIAL RATE: 87 BPM
ATRIAL RATE: 87 BPM
BACTERIA BLD CULT: NORMAL
BACTERIA BLD CULT: NORMAL
BACTERIA UR QL AUTO: NORMAL /HPF
BASE EXCESS BLDA CALC-SCNC: 7 MMOL/L (ref -2–3)
BASOPHILS # BLD MANUAL: 0 THOUSAND/UL (ref 0–0.1)
BASOPHILS NFR MAR MANUAL: 0 % (ref 0–1)
BILIRUB SERPL-MCNC: 0.2 MG/DL (ref 0.2–1)
BILIRUB SERPL-MCNC: 0.4 MG/DL (ref 0.2–1)
BILIRUB UR QL STRIP: NEGATIVE
BUN SERPL-MCNC: 16 MG/DL (ref 5–25)
BUN SERPL-MCNC: 17 MG/DL (ref 5–25)
BUN SERPL-MCNC: 18 MG/DL (ref 5–25)
BUN SERPL-MCNC: 20 MG/DL (ref 5–25)
BUN SERPL-MCNC: 21 MG/DL (ref 5–25)
BUN SERPL-MCNC: 23 MG/DL (ref 5–25)
BUN SERPL-MCNC: 23 MG/DL (ref 5–25)
CA-I BLD-SCNC: 1.13 MMOL/L (ref 1.12–1.32)
CALCIUM ALBUM COR SERPL-MCNC: 9 MG/DL (ref 8.3–10.1)
CALCIUM ALBUM COR SERPL-MCNC: 9.1 MG/DL (ref 8.3–10.1)
CALCIUM ALBUM COR SERPL-MCNC: 9.3 MG/DL (ref 8.3–10.1)
CALCIUM ALBUM COR SERPL-MCNC: 9.3 MG/DL (ref 8.3–10.1)
CALCIUM ALBUM COR SERPL-MCNC: 9.4 MG/DL (ref 8.3–10.1)
CALCIUM ALBUM COR SERPL-MCNC: 9.5 MG/DL (ref 8.3–10.1)
CALCIUM ALBUM COR SERPL-MCNC: 9.7 MG/DL (ref 8.3–10.1)
CALCIUM SERPL-MCNC: 7.4 MG/DL (ref 8.3–10.1)
CALCIUM SERPL-MCNC: 7.5 MG/DL (ref 8.3–10.1)
CALCIUM SERPL-MCNC: 7.6 MG/DL (ref 8.3–10.1)
CALCIUM SERPL-MCNC: 7.7 MG/DL (ref 8.3–10.1)
CALCIUM SERPL-MCNC: 7.7 MG/DL (ref 8.3–10.1)
CALCIUM SERPL-MCNC: 7.8 MG/DL (ref 8.3–10.1)
CALCIUM SERPL-MCNC: 7.9 MG/DL (ref 8.3–10.1)
CALCIUM SERPL-MCNC: 7.9 MG/DL (ref 8.3–10.1)
CALCIUM SERPL-MCNC: 8.6 MG/DL (ref 8.3–10.1)
CARDIAC TROPONIN I PNL SERPL HS: 12 NG/L
CARDIAC TROPONIN I PNL SERPL HS: 12 NG/L
CARDIAC TROPONIN I PNL SERPL HS: 15 NG/L
CHLORIDE SERPL-SCNC: 100 MMOL/L (ref 100–108)
CHLORIDE SERPL-SCNC: 100 MMOL/L (ref 100–108)
CHLORIDE SERPL-SCNC: 106 MMOL/L (ref 100–108)
CHLORIDE SERPL-SCNC: 106 MMOL/L (ref 100–108)
CHLORIDE SERPL-SCNC: 111 MMOL/L (ref 100–108)
CHLORIDE SERPL-SCNC: 111 MMOL/L (ref 100–108)
CHLORIDE SERPL-SCNC: 112 MMOL/L (ref 100–108)
CHLORIDE SERPL-SCNC: 90 MMOL/L (ref 100–108)
CHLORIDE SERPL-SCNC: 94 MMOL/L (ref 100–108)
CHLORIDE SERPL-SCNC: 98 MMOL/L (ref 100–108)
CHLORIDE SERPL-SCNC: 98 MMOL/L (ref 100–108)
CLARITY UR: CLEAR
CO2 SERPL-SCNC: 25 MMOL/L (ref 21–32)
CO2 SERPL-SCNC: 26 MMOL/L (ref 21–32)
CO2 SERPL-SCNC: 26 MMOL/L (ref 21–32)
CO2 SERPL-SCNC: 28 MMOL/L (ref 21–32)
CO2 SERPL-SCNC: 29 MMOL/L (ref 21–32)
CO2 SERPL-SCNC: 29 MMOL/L (ref 21–32)
CO2 SERPL-SCNC: 30 MMOL/L (ref 21–32)
CO2 SERPL-SCNC: 31 MMOL/L (ref 21–32)
COLOR UR: YELLOW
CREAT SERPL-MCNC: 0.38 MG/DL (ref 0.6–1.3)
CREAT SERPL-MCNC: 0.4 MG/DL (ref 0.6–1.3)
CREAT SERPL-MCNC: 0.42 MG/DL (ref 0.6–1.3)
CREAT SERPL-MCNC: 0.43 MG/DL (ref 0.6–1.3)
CREAT SERPL-MCNC: 0.44 MG/DL (ref 0.6–1.3)
CREAT SERPL-MCNC: 0.53 MG/DL (ref 0.6–1.3)
CREAT SERPL-MCNC: 0.78 MG/DL (ref 0.6–1.3)
CRP SERPL QL: 117.5 MG/L
CRP SERPL QL: 123.3 MG/L
CRP SERPL QL: 35.9 MG/L
CRP SERPL QL: 44.2 MG/L
CRP SERPL QL: 54.6 MG/L
CRP SERPL QL: 72.9 MG/L
CRP SERPL QL: 94.5 MG/L
D DIMER PPP FEU-MCNC: 2.96 UG/ML FEU
EOSINOPHIL # BLD MANUAL: 0 THOUSAND/UL (ref 0–0.4)
EOSINOPHIL NFR BLD MANUAL: 0 % (ref 0–6)
ERYTHROCYTE [DISTWIDTH] IN BLOOD BY AUTOMATED COUNT: 16.3 % (ref 11.6–15.1)
ERYTHROCYTE [DISTWIDTH] IN BLOOD BY AUTOMATED COUNT: 16.3 % (ref 11.6–15.1)
ERYTHROCYTE [DISTWIDTH] IN BLOOD BY AUTOMATED COUNT: 16.4 % (ref 11.6–15.1)
ERYTHROCYTE [DISTWIDTH] IN BLOOD BY AUTOMATED COUNT: 16.5 % (ref 11.6–15.1)
ERYTHROCYTE [DISTWIDTH] IN BLOOD BY AUTOMATED COUNT: 16.6 % (ref 11.6–15.1)
ERYTHROCYTE [DISTWIDTH] IN BLOOD BY AUTOMATED COUNT: 16.9 % (ref 11.6–15.1)
ERYTHROCYTE [DISTWIDTH] IN BLOOD BY AUTOMATED COUNT: 17.1 % (ref 11.6–15.1)
ERYTHROCYTE [DISTWIDTH] IN BLOOD BY AUTOMATED COUNT: 17.3 % (ref 11.6–15.1)
ERYTHROCYTE [DISTWIDTH] IN BLOOD BY AUTOMATED COUNT: 17.6 % (ref 11.6–15.1)
ERYTHROCYTE [DISTWIDTH] IN BLOOD BY AUTOMATED COUNT: 18.2 % (ref 11.6–15.1)
FLUAV RNA RESP QL NAA+PROBE: NEGATIVE
FLUBV RNA RESP QL NAA+PROBE: NEGATIVE
GFR SERPL CREATININE-BSD FRML MDRD: 68 ML/MIN/1.73SQ M
GFR SERPL CREATININE-BSD FRML MDRD: 85 ML/MIN/1.73SQ M
GFR SERPL CREATININE-BSD FRML MDRD: 91 ML/MIN/1.73SQ M
GFR SERPL CREATININE-BSD FRML MDRD: 92 ML/MIN/1.73SQ M
GFR SERPL CREATININE-BSD FRML MDRD: 93 ML/MIN/1.73SQ M
GFR SERPL CREATININE-BSD FRML MDRD: 95 ML/MIN/1.73SQ M
GLUCOSE SERPL-MCNC: 120 MG/DL (ref 65–140)
GLUCOSE SERPL-MCNC: 129 MG/DL (ref 65–140)
GLUCOSE SERPL-MCNC: 129 MG/DL (ref 65–140)
GLUCOSE SERPL-MCNC: 146 MG/DL (ref 65–140)
GLUCOSE SERPL-MCNC: 159 MG/DL (ref 65–140)
GLUCOSE SERPL-MCNC: 197 MG/DL (ref 65–140)
GLUCOSE SERPL-MCNC: 198 MG/DL (ref 65–140)
GLUCOSE SERPL-MCNC: 80 MG/DL (ref 65–140)
GLUCOSE SERPL-MCNC: 88 MG/DL (ref 65–140)
GLUCOSE SERPL-MCNC: 90 MG/DL (ref 65–140)
GLUCOSE SERPL-MCNC: 90 MG/DL (ref 65–140)
GLUCOSE SERPL-MCNC: 92 MG/DL (ref 65–140)
GLUCOSE SERPL-MCNC: 95 MG/DL (ref 65–140)
GLUCOSE SERPL-MCNC: 99 MG/DL (ref 65–140)
GLUCOSE UR STRIP-MCNC: NEGATIVE MG/DL
HCO3 BLDA-SCNC: 32.3 MMOL/L (ref 24–30)
HCT VFR BLD AUTO: 22.9 % (ref 34.8–46.1)
HCT VFR BLD AUTO: 22.9 % (ref 34.8–46.1)
HCT VFR BLD AUTO: 23.4 % (ref 34.8–46.1)
HCT VFR BLD AUTO: 25.6 % (ref 34.8–46.1)
HCT VFR BLD AUTO: 25.7 % (ref 34.8–46.1)
HCT VFR BLD AUTO: 25.7 % (ref 34.8–46.1)
HCT VFR BLD AUTO: 25.8 % (ref 34.8–46.1)
HCT VFR BLD AUTO: 26 % (ref 34.8–46.1)
HCT VFR BLD AUTO: 26.3 % (ref 34.8–46.1)
HCT VFR BLD AUTO: 26.9 % (ref 34.8–46.1)
HCT VFR BLD AUTO: 27.8 % (ref 34.8–46.1)
HCT VFR BLD AUTO: 31.4 % (ref 34.8–46.1)
HCT VFR BLD CALC: 30 % (ref 34.8–46.1)
HGB BLD-MCNC: 7.1 G/DL (ref 11.5–15.4)
HGB BLD-MCNC: 7.1 G/DL (ref 11.5–15.4)
HGB BLD-MCNC: 7.2 G/DL (ref 11.5–15.4)
HGB BLD-MCNC: 7.5 G/DL (ref 11.5–15.4)
HGB BLD-MCNC: 7.7 G/DL (ref 11.5–15.4)
HGB BLD-MCNC: 8 G/DL (ref 11.5–15.4)
HGB BLD-MCNC: 8.2 G/DL (ref 11.5–15.4)
HGB BLD-MCNC: 8.2 G/DL (ref 11.5–15.4)
HGB BLD-MCNC: 8.4 G/DL (ref 11.5–15.4)
HGB BLD-MCNC: 9.5 G/DL (ref 11.5–15.4)
HGB BLDA-MCNC: 10.2 G/DL (ref 11.5–15.4)
HGB UR QL STRIP.AUTO: ABNORMAL
HYPERCHROMIA BLD QL SMEAR: PRESENT
HYPERCHROMIA BLD QL SMEAR: PRESENT
INR PPP: 1.01 (ref 0.84–1.19)
KETONES UR STRIP-MCNC: NEGATIVE MG/DL
LACTATE SERPL-SCNC: 1.9 MMOL/L (ref 0.5–2)
LEUKOCYTE ESTERASE UR QL STRIP: ABNORMAL
LG PLATELETS BLD QL SMEAR: PRESENT
LG PLATELETS BLD QL SMEAR: PRESENT
LYMPHOCYTES # BLD AUTO: 10.23 THOUSAND/UL (ref 0.6–4.47)
LYMPHOCYTES # BLD AUTO: 10.97 THOUSAND/UL (ref 0.6–4.47)
LYMPHOCYTES # BLD AUTO: 13.15 THOUSAND/UL (ref 0.6–4.47)
LYMPHOCYTES # BLD AUTO: 14.81 THOUSAND/UL (ref 0.6–4.47)
LYMPHOCYTES # BLD AUTO: 16.5 THOUSAND/UL (ref 0.6–4.47)
LYMPHOCYTES # BLD AUTO: 16.63 THOUSAND/UL (ref 0.6–4.47)
LYMPHOCYTES # BLD AUTO: 32 % (ref 14–44)
LYMPHOCYTES # BLD AUTO: 47 % (ref 14–44)
LYMPHOCYTES # BLD AUTO: 5.53 THOUSAND/UL (ref 0.6–4.47)
LYMPHOCYTES # BLD AUTO: 57 % (ref 14–44)
LYMPHOCYTES # BLD AUTO: 60 % (ref 14–44)
LYMPHOCYTES # BLD AUTO: 63 % (ref 14–44)
LYMPHOCYTES # BLD AUTO: 63 % (ref 14–44)
LYMPHOCYTES # BLD AUTO: 64 % (ref 14–44)
LYMPHOCYTES # BLD AUTO: 65 % (ref 14–44)
LYMPHOCYTES # BLD AUTO: 7.6 THOUSAND/UL (ref 0.6–4.47)
LYMPHOCYTES # BLD AUTO: 79 % (ref 14–44)
LYMPHOCYTES # BLD AUTO: 8.61 THOUSAND/UL (ref 0.6–4.47)
MCH RBC QN AUTO: 24.6 PG (ref 26.8–34.3)
MCH RBC QN AUTO: 24.7 PG (ref 26.8–34.3)
MCH RBC QN AUTO: 24.8 PG (ref 26.8–34.3)
MCH RBC QN AUTO: 25 PG (ref 26.8–34.3)
MCH RBC QN AUTO: 25.1 PG (ref 26.8–34.3)
MCH RBC QN AUTO: 25.2 PG (ref 26.8–34.3)
MCH RBC QN AUTO: 25.2 PG (ref 26.8–34.3)
MCHC RBC AUTO-ENTMCNC: 29.3 G/DL (ref 31.4–37.4)
MCHC RBC AUTO-ENTMCNC: 29.6 G/DL (ref 31.4–37.4)
MCHC RBC AUTO-ENTMCNC: 30 G/DL (ref 31.4–37.4)
MCHC RBC AUTO-ENTMCNC: 30 G/DL (ref 31.4–37.4)
MCHC RBC AUTO-ENTMCNC: 30.2 G/DL (ref 31.4–37.4)
MCHC RBC AUTO-ENTMCNC: 30.3 G/DL (ref 31.4–37.4)
MCHC RBC AUTO-ENTMCNC: 30.3 G/DL (ref 31.4–37.4)
MCHC RBC AUTO-ENTMCNC: 30.4 G/DL (ref 31.4–37.4)
MCHC RBC AUTO-ENTMCNC: 30.5 G/DL (ref 31.4–37.4)
MCHC RBC AUTO-ENTMCNC: 31 G/DL (ref 31.4–37.4)
MCHC RBC AUTO-ENTMCNC: 31.4 G/DL (ref 31.4–37.4)
MCHC RBC AUTO-ENTMCNC: 31.8 G/DL (ref 31.4–37.4)
MCV RBC AUTO: 79 FL (ref 82–98)
MCV RBC AUTO: 80 FL (ref 82–98)
MCV RBC AUTO: 80 FL (ref 82–98)
MCV RBC AUTO: 82 FL (ref 82–98)
MCV RBC AUTO: 83 FL (ref 82–98)
MCV RBC AUTO: 84 FL (ref 82–98)
METAMYELOCYTES NFR BLD MANUAL: 1 % (ref 0–1)
MONOCYTES # BLD AUTO: 0 THOUSAND/UL (ref 0–1.22)
MONOCYTES # BLD AUTO: 0.29 THOUSAND/UL (ref 0–1.22)
MONOCYTES # BLD AUTO: 0.42 THOUSAND/UL (ref 0–1.22)
MONOCYTES # BLD AUTO: 0.46 THOUSAND/UL (ref 0–1.22)
MONOCYTES # BLD AUTO: 0.63 THOUSAND/UL (ref 0–1.22)
MONOCYTES # BLD AUTO: 0.73 THOUSAND/UL (ref 0–1.22)
MONOCYTES # BLD AUTO: 0.81 THOUSAND/UL (ref 0–1.22)
MONOCYTES NFR BLD: 0 % (ref 4–12)
MONOCYTES NFR BLD: 1 % (ref 4–12)
MONOCYTES NFR BLD: 2 % (ref 4–12)
MONOCYTES NFR BLD: 2 % (ref 4–12)
MONOCYTES NFR BLD: 3 % (ref 4–12)
MONOCYTES NFR BLD: 4 % (ref 4–12)
MONOCYTES NFR BLD: 5 % (ref 4–12)
NEUTROPHILS # BLD MANUAL: 11.76 THOUSAND/UL (ref 1.85–7.62)
NEUTROPHILS # BLD MANUAL: 11.96 THOUSAND/UL (ref 1.85–7.62)
NEUTROPHILS # BLD MANUAL: 3.97 THOUSAND/UL (ref 1.85–7.62)
NEUTROPHILS # BLD MANUAL: 4.84 THOUSAND/UL (ref 1.85–7.62)
NEUTROPHILS # BLD MANUAL: 5.2 THOUSAND/UL (ref 1.85–7.62)
NEUTROPHILS # BLD MANUAL: 5.3 THOUSAND/UL (ref 1.85–7.62)
NEUTROPHILS # BLD MANUAL: 7.1 THOUSAND/UL (ref 1.85–7.62)
NEUTROPHILS # BLD MANUAL: 7.52 THOUSAND/UL (ref 1.85–7.62)
NEUTROPHILS # BLD MANUAL: 8.57 THOUSAND/UL (ref 1.85–7.62)
NEUTS BAND NFR BLD MANUAL: 1 % (ref 0–8)
NEUTS BAND NFR BLD MANUAL: 2 % (ref 0–8)
NEUTS BAND NFR BLD MANUAL: 2 % (ref 0–8)
NEUTS BAND NFR BLD MANUAL: 3 % (ref 0–8)
NEUTS BAND NFR BLD MANUAL: 5 % (ref 0–8)
NEUTS SEG NFR BLD AUTO: 19 % (ref 43–75)
NEUTS SEG NFR BLD AUTO: 28 % (ref 43–75)
NEUTS SEG NFR BLD AUTO: 29 % (ref 43–75)
NEUTS SEG NFR BLD AUTO: 32 % (ref 43–75)
NEUTS SEG NFR BLD AUTO: 33 % (ref 43–75)
NEUTS SEG NFR BLD AUTO: 34 % (ref 43–75)
NEUTS SEG NFR BLD AUTO: 38 % (ref 43–75)
NEUTS SEG NFR BLD AUTO: 51 % (ref 43–75)
NEUTS SEG NFR BLD AUTO: 68 % (ref 43–75)
NITRITE UR QL STRIP: NEGATIVE
NON-SQ EPI CELLS URNS QL MICRO: NORMAL /HPF
P AXIS: 79 DEGREES
P AXIS: 80 DEGREES
PCO2 BLD: 34 MMOL/L (ref 21–32)
PCO2 BLD: 49.9 MM HG (ref 42–50)
PH BLD: 7.42 [PH] (ref 7.3–7.4)
PH UR STRIP.AUTO: 6 [PH]
PLATELET # BLD AUTO: 482 THOUSANDS/UL (ref 149–390)
PLATELET # BLD AUTO: 499 THOUSANDS/UL (ref 149–390)
PLATELET # BLD AUTO: 539 THOUSANDS/UL (ref 149–390)
PLATELET # BLD AUTO: 549 THOUSANDS/UL (ref 149–390)
PLATELET # BLD AUTO: 559 THOUSANDS/UL (ref 149–390)
PLATELET # BLD AUTO: 567 THOUSANDS/UL (ref 149–390)
PLATELET # BLD AUTO: 570 THOUSANDS/UL (ref 149–390)
PLATELET # BLD AUTO: 571 THOUSANDS/UL (ref 149–390)
PLATELET # BLD AUTO: 573 THOUSANDS/UL (ref 149–390)
PLATELET # BLD AUTO: 598 THOUSANDS/UL (ref 149–390)
PLATELET # BLD AUTO: 619 THOUSANDS/UL (ref 149–390)
PLATELET # BLD AUTO: 626 THOUSANDS/UL (ref 149–390)
PLATELET # BLD AUTO: 630 THOUSANDS/UL (ref 149–390)
PLATELET BLD QL SMEAR: ABNORMAL
PMV BLD AUTO: 10 FL (ref 8.9–12.7)
PMV BLD AUTO: 10.1 FL (ref 8.9–12.7)
PMV BLD AUTO: 10.1 FL (ref 8.9–12.7)
PMV BLD AUTO: 10.4 FL (ref 8.9–12.7)
PMV BLD AUTO: 10.5 FL (ref 8.9–12.7)
PMV BLD AUTO: 8.9 FL (ref 8.9–12.7)
PMV BLD AUTO: 9 FL (ref 8.9–12.7)
PMV BLD AUTO: 9.4 FL (ref 8.9–12.7)
PMV BLD AUTO: 9.4 FL (ref 8.9–12.7)
PMV BLD AUTO: 9.6 FL (ref 8.9–12.7)
PMV BLD AUTO: 9.7 FL (ref 8.9–12.7)
PO2 BLD: 19 MM HG (ref 35–45)
POTASSIUM BLD-SCNC: 4 MMOL/L (ref 3.5–5.3)
POTASSIUM SERPL-SCNC: 3.6 MMOL/L (ref 3.5–5.3)
POTASSIUM SERPL-SCNC: 3.6 MMOL/L (ref 3.5–5.3)
POTASSIUM SERPL-SCNC: 3.8 MMOL/L (ref 3.5–5.3)
POTASSIUM SERPL-SCNC: 3.9 MMOL/L (ref 3.5–5.3)
POTASSIUM SERPL-SCNC: 3.9 MMOL/L (ref 3.5–5.3)
POTASSIUM SERPL-SCNC: 4 MMOL/L (ref 3.5–5.3)
POTASSIUM SERPL-SCNC: 4.3 MMOL/L (ref 3.5–5.3)
POTASSIUM SERPL-SCNC: 4.5 MMOL/L (ref 3.5–5.3)
POTASSIUM SERPL-SCNC: 4.5 MMOL/L (ref 3.5–5.3)
POTASSIUM SERPL-SCNC: 4.6 MMOL/L (ref 3.5–5.3)
POTASSIUM SERPL-SCNC: 4.7 MMOL/L (ref 3.5–5.3)
PR INTERVAL: 148 MS
PR INTERVAL: 156 MS
PROCALCITONIN SERPL-MCNC: 0.06 NG/ML
PROCALCITONIN SERPL-MCNC: 0.1 NG/ML
PROCALCITONIN SERPL-MCNC: 0.21 NG/ML
PROCALCITONIN SERPL-MCNC: 0.29 NG/ML
PROCALCITONIN SERPL-MCNC: 0.46 NG/ML
PROCALCITONIN SERPL-MCNC: 0.49 NG/ML
PROT SERPL-MCNC: 4.5 G/DL (ref 6.4–8.2)
PROT SERPL-MCNC: 4.7 G/DL (ref 6.4–8.2)
PROT SERPL-MCNC: 4.9 G/DL (ref 6.4–8.2)
PROT SERPL-MCNC: 5 G/DL (ref 6.4–8.2)
PROT SERPL-MCNC: 5 G/DL (ref 6.4–8.2)
PROT SERPL-MCNC: 5.3 G/DL (ref 6.4–8.2)
PROT SERPL-MCNC: 6.6 G/DL (ref 6.4–8.2)
PROT UR STRIP-MCNC: ABNORMAL MG/DL
PROTHROMBIN TIME: 13.2 SECONDS (ref 11.6–14.5)
QRS AXIS: 93 DEGREES
QRS AXIS: 95 DEGREES
QRSD INTERVAL: 70 MS
QRSD INTERVAL: 72 MS
QT INTERVAL: 372 MS
QT INTERVAL: 374 MS
QTC INTERVAL: 447 MS
QTC INTERVAL: 450 MS
RBC # BLD AUTO: 2.86 MILLION/UL (ref 3.81–5.12)
RBC # BLD AUTO: 2.86 MILLION/UL (ref 3.81–5.12)
RBC # BLD AUTO: 2.87 MILLION/UL (ref 3.81–5.12)
RBC # BLD AUTO: 3.04 MILLION/UL (ref 3.81–5.12)
RBC # BLD AUTO: 3.06 MILLION/UL (ref 3.81–5.12)
RBC # BLD AUTO: 3.07 MILLION/UL (ref 3.81–5.12)
RBC # BLD AUTO: 3.13 MILLION/UL (ref 3.81–5.12)
RBC # BLD AUTO: 3.22 MILLION/UL (ref 3.81–5.12)
RBC # BLD AUTO: 3.27 MILLION/UL (ref 3.81–5.12)
RBC # BLD AUTO: 3.27 MILLION/UL (ref 3.81–5.12)
RBC # BLD AUTO: 3.36 MILLION/UL (ref 3.81–5.12)
RBC # BLD AUTO: 3.77 MILLION/UL (ref 3.81–5.12)
RBC #/AREA URNS AUTO: NORMAL /HPF
RBC MORPH BLD: NORMAL
RBC MORPH BLD: NORMAL
RBC MORPH BLD: PRESENT
RSV RNA RESP QL NAA+PROBE: NEGATIVE
SAO2 % BLD FROM PO2: 29 % (ref 60–85)
SARS-COV-2 RNA RESP QL NAA+PROBE: POSITIVE
SODIUM BLD-SCNC: 135 MMOL/L (ref 136–145)
SODIUM SERPL-SCNC: 124 MMOL/L (ref 136–145)
SODIUM SERPL-SCNC: 126 MMOL/L (ref 136–145)
SODIUM SERPL-SCNC: 130 MMOL/L (ref 136–145)
SODIUM SERPL-SCNC: 133 MMOL/L (ref 136–145)
SODIUM SERPL-SCNC: 134 MMOL/L (ref 136–145)
SODIUM SERPL-SCNC: 138 MMOL/L (ref 136–145)
SODIUM SERPL-SCNC: 140 MMOL/L (ref 136–145)
SODIUM SERPL-SCNC: 144 MMOL/L (ref 136–145)
SODIUM SERPL-SCNC: 145 MMOL/L (ref 136–145)
SODIUM SERPL-SCNC: 145 MMOL/L (ref 136–145)
SODIUM SERPL-SCNC: 146 MMOL/L (ref 136–145)
SP GR UR STRIP.AUTO: 1.02 (ref 1–1.03)
SPECIMEN SOURCE: ABNORMAL
T WAVE AXIS: 101 DEGREES
T WAVE AXIS: 91 DEGREES
UROBILINOGEN UR QL STRIP.AUTO: 0.2 E.U./DL
VARIANT LYMPHS # BLD AUTO: 7 %
VENTRICULAR RATE: 87 BPM
VENTRICULAR RATE: 87 BPM
WBC # BLD AUTO: 13.45 THOUSAND/UL (ref 4.31–10.16)
WBC # BLD AUTO: 16.17 THOUSAND/UL (ref 4.31–10.16)
WBC # BLD AUTO: 16.24 THOUSAND/UL (ref 4.31–10.16)
WBC # BLD AUTO: 17.29 THOUSAND/UL (ref 4.31–10.16)
WBC # BLD AUTO: 17.45 THOUSAND/UL (ref 4.31–10.16)
WBC # BLD AUTO: 18.29 THOUSAND/UL (ref 4.31–10.16)
WBC # BLD AUTO: 18.34 THOUSAND/UL (ref 4.31–10.16)
WBC # BLD AUTO: 20.31 THOUSAND/UL (ref 4.31–10.16)
WBC # BLD AUTO: 20.88 THOUSAND/UL (ref 4.31–10.16)
WBC # BLD AUTO: 20.88 THOUSAND/UL (ref 4.31–10.16)
WBC # BLD AUTO: 22.78 THOUSAND/UL (ref 4.31–10.16)
WBC # BLD AUTO: 29.17 THOUSAND/UL (ref 4.31–10.16)
WBC #/AREA URNS AUTO: NORMAL /HPF

## 2022-01-01 PROCEDURE — 80053 COMPREHEN METABOLIC PANEL: CPT | Performed by: INTERNAL MEDICINE

## 2022-01-01 PROCEDURE — 84145 PROCALCITONIN (PCT): CPT | Performed by: INTERNAL MEDICINE

## 2022-01-01 PROCEDURE — 85730 THROMBOPLASTIN TIME PARTIAL: CPT | Performed by: INTERNAL MEDICINE

## 2022-01-01 PROCEDURE — 80053 COMPREHEN METABOLIC PANEL: CPT | Performed by: EMERGENCY MEDICINE

## 2022-01-01 PROCEDURE — 99232 SBSQ HOSP IP/OBS MODERATE 35: CPT | Performed by: INTERNAL MEDICINE

## 2022-01-01 PROCEDURE — 92610 EVALUATE SWALLOWING FUNCTION: CPT

## 2022-01-01 PROCEDURE — XW033E5 INTRODUCTION OF REMDESIVIR ANTI-INFECTIVE INTO PERIPHERAL VEIN, PERCUTANEOUS APPROACH, NEW TECHNOLOGY GROUP 5: ICD-10-PCS | Performed by: HOSPITALIST

## 2022-01-01 PROCEDURE — 85007 BL SMEAR W/DIFF WBC COUNT: CPT | Performed by: EMERGENCY MEDICINE

## 2022-01-01 PROCEDURE — 99232 SBSQ HOSP IP/OBS MODERATE 35: CPT | Performed by: PHYSICIAN ASSISTANT

## 2022-01-01 PROCEDURE — 85730 THROMBOPLASTIN TIME PARTIAL: CPT | Performed by: HOSPITALIST

## 2022-01-01 PROCEDURE — 96374 THER/PROPH/DIAG INJ IV PUSH: CPT

## 2022-01-01 PROCEDURE — 84484 ASSAY OF TROPONIN QUANT: CPT | Performed by: PHYSICIAN ASSISTANT

## 2022-01-01 PROCEDURE — 99239 HOSP IP/OBS DSCHRG MGMT >30: CPT | Performed by: INTERNAL MEDICINE

## 2022-01-01 PROCEDURE — 71045 X-RAY EXAM CHEST 1 VIEW: CPT

## 2022-01-01 PROCEDURE — 82948 REAGENT STRIP/BLOOD GLUCOSE: CPT

## 2022-01-01 PROCEDURE — 85027 COMPLETE CBC AUTOMATED: CPT | Performed by: INTERNAL MEDICINE

## 2022-01-01 PROCEDURE — 85007 BL SMEAR W/DIFF WBC COUNT: CPT | Performed by: PHYSICIAN ASSISTANT

## 2022-01-01 PROCEDURE — 85730 THROMBOPLASTIN TIME PARTIAL: CPT | Performed by: PHYSICIAN ASSISTANT

## 2022-01-01 PROCEDURE — 84295 ASSAY OF SERUM SODIUM: CPT

## 2022-01-01 PROCEDURE — 85007 BL SMEAR W/DIFF WBC COUNT: CPT | Performed by: INTERNAL MEDICINE

## 2022-01-01 PROCEDURE — 81001 URINALYSIS AUTO W/SCOPE: CPT | Performed by: EMERGENCY MEDICINE

## 2022-01-01 PROCEDURE — 86140 C-REACTIVE PROTEIN: CPT | Performed by: INTERNAL MEDICINE

## 2022-01-01 PROCEDURE — 87040 BLOOD CULTURE FOR BACTERIA: CPT | Performed by: EMERGENCY MEDICINE

## 2022-01-01 PROCEDURE — 85027 COMPLETE CBC AUTOMATED: CPT | Performed by: PHYSICIAN ASSISTANT

## 2022-01-01 PROCEDURE — XW0DXM6 INTRODUCTION OF BARICITINIB INTO MOUTH AND PHARYNX, EXTERNAL APPROACH, NEW TECHNOLOGY GROUP 6: ICD-10-PCS | Performed by: INTERNAL MEDICINE

## 2022-01-01 PROCEDURE — 0241U HB NFCT DS VIR RESP RNA 4 TRGT: CPT | Performed by: EMERGENCY MEDICINE

## 2022-01-01 PROCEDURE — 84132 ASSAY OF SERUM POTASSIUM: CPT

## 2022-01-01 PROCEDURE — 84484 ASSAY OF TROPONIN QUANT: CPT | Performed by: EMERGENCY MEDICINE

## 2022-01-01 PROCEDURE — 80053 COMPREHEN METABOLIC PANEL: CPT | Performed by: PHYSICIAN ASSISTANT

## 2022-01-01 PROCEDURE — 99291 CRITICAL CARE FIRST HOUR: CPT | Performed by: EMERGENCY MEDICINE

## 2022-01-01 PROCEDURE — 83605 ASSAY OF LACTIC ACID: CPT | Performed by: EMERGENCY MEDICINE

## 2022-01-01 PROCEDURE — 80048 BASIC METABOLIC PNL TOTAL CA: CPT | Performed by: INTERNAL MEDICINE

## 2022-01-01 PROCEDURE — 94760 N-INVAS EAR/PLS OXIMETRY 1: CPT

## 2022-01-01 PROCEDURE — 93010 ELECTROCARDIOGRAM REPORT: CPT | Performed by: INTERNAL MEDICINE

## 2022-01-01 PROCEDURE — 85014 HEMATOCRIT: CPT

## 2022-01-01 PROCEDURE — 99233 SBSQ HOSP IP/OBS HIGH 50: CPT | Performed by: INTERNAL MEDICINE

## 2022-01-01 PROCEDURE — 36415 COLL VENOUS BLD VENIPUNCTURE: CPT

## 2022-01-01 PROCEDURE — 92526 ORAL FUNCTION THERAPY: CPT

## 2022-01-01 PROCEDURE — 97167 OT EVAL HIGH COMPLEX 60 MIN: CPT

## 2022-01-01 PROCEDURE — 84145 PROCALCITONIN (PCT): CPT | Performed by: EMERGENCY MEDICINE

## 2022-01-01 PROCEDURE — 85610 PROTHROMBIN TIME: CPT | Performed by: EMERGENCY MEDICINE

## 2022-01-01 PROCEDURE — 94660 CPAP INITIATION&MGMT: CPT

## 2022-01-01 PROCEDURE — 94003 VENT MGMT INPAT SUBQ DAY: CPT

## 2022-01-01 PROCEDURE — 99291 CRITICAL CARE FIRST HOUR: CPT

## 2022-01-01 PROCEDURE — 85027 COMPLETE CBC AUTOMATED: CPT | Performed by: EMERGENCY MEDICINE

## 2022-01-01 PROCEDURE — 97163 PT EVAL HIGH COMPLEX 45 MIN: CPT

## 2022-01-01 PROCEDURE — 82947 ASSAY GLUCOSE BLOOD QUANT: CPT

## 2022-01-01 PROCEDURE — 82803 BLOOD GASES ANY COMBINATION: CPT

## 2022-01-01 PROCEDURE — 99222 1ST HOSP IP/OBS MODERATE 55: CPT | Performed by: PHYSICIAN ASSISTANT

## 2022-01-01 PROCEDURE — 85730 THROMBOPLASTIN TIME PARTIAL: CPT | Performed by: EMERGENCY MEDICINE

## 2022-01-01 PROCEDURE — 93005 ELECTROCARDIOGRAM TRACING: CPT

## 2022-01-01 PROCEDURE — 85379 FIBRIN DEGRADATION QUANT: CPT | Performed by: PHYSICIAN ASSISTANT

## 2022-01-01 PROCEDURE — 84145 PROCALCITONIN (PCT): CPT | Performed by: PHYSICIAN ASSISTANT

## 2022-01-01 PROCEDURE — 82330 ASSAY OF CALCIUM: CPT

## 2022-01-01 PROCEDURE — 99223 1ST HOSP IP/OBS HIGH 75: CPT | Performed by: PHYSICIAN ASSISTANT

## 2022-01-01 PROCEDURE — 85049 AUTOMATED PLATELET COUNT: CPT | Performed by: PHYSICIAN ASSISTANT

## 2022-01-01 RX ORDER — ACETAMINOPHEN 325 MG/1
650 TABLET ORAL EVERY 6 HOURS PRN
Status: DISCONTINUED | OUTPATIENT
Start: 2022-01-01 | End: 2022-01-01 | Stop reason: HOSPADM

## 2022-01-01 RX ORDER — MIRTAZAPINE 15 MG/1
15 TABLET, FILM COATED ORAL
Status: DISCONTINUED | OUTPATIENT
Start: 2022-01-01 | End: 2022-01-01 | Stop reason: HOSPADM

## 2022-01-01 RX ORDER — HEPARIN SODIUM 10000 [USP'U]/100ML
3-20 INJECTION, SOLUTION INTRAVENOUS
Status: DISCONTINUED | OUTPATIENT
Start: 2022-01-01 | End: 2022-01-01

## 2022-01-01 RX ORDER — FUROSEMIDE 10 MG/ML
20 INJECTION INTRAMUSCULAR; INTRAVENOUS ONCE
Status: COMPLETED | OUTPATIENT
Start: 2022-01-01 | End: 2022-01-01

## 2022-01-01 RX ORDER — CEFEPIME HYDROCHLORIDE 2 G/50ML
2000 INJECTION, SOLUTION INTRAVENOUS ONCE
Status: COMPLETED | OUTPATIENT
Start: 2022-01-01 | End: 2022-01-01

## 2022-01-01 RX ORDER — SODIUM CHLORIDE 9 MG/ML
100 INJECTION, SOLUTION INTRAVENOUS CONTINUOUS
Status: DISCONTINUED | OUTPATIENT
Start: 2022-01-01 | End: 2022-01-01

## 2022-01-01 RX ORDER — AMOXICILLIN 250 MG
1 CAPSULE ORAL DAILY
Status: DISCONTINUED | OUTPATIENT
Start: 2022-01-01 | End: 2022-01-01 | Stop reason: HOSPADM

## 2022-01-01 RX ORDER — SODIUM CHLORIDE 9 MG/ML
125 INJECTION, SOLUTION INTRAVENOUS ONCE
Status: COMPLETED | OUTPATIENT
Start: 2022-01-01 | End: 2022-01-01

## 2022-01-01 RX ORDER — DOXYCYCLINE HYCLATE 100 MG/1
100 CAPSULE ORAL EVERY 12 HOURS SCHEDULED
Status: DISCONTINUED | OUTPATIENT
Start: 2022-01-01 | End: 2022-01-01

## 2022-01-01 RX ORDER — HEPARIN SODIUM 10000 [USP'U]/100ML
3-20 INJECTION, SOLUTION INTRAVENOUS
Status: DISCONTINUED | OUTPATIENT
Start: 2022-01-01 | End: 2022-01-01 | Stop reason: HOSPADM

## 2022-01-01 RX ORDER — DEXAMETHASONE SODIUM PHOSPHATE 4 MG/ML
6 INJECTION, SOLUTION INTRA-ARTICULAR; INTRALESIONAL; INTRAMUSCULAR; INTRAVENOUS; SOFT TISSUE EVERY 24 HOURS
Status: COMPLETED | OUTPATIENT
Start: 2022-01-01 | End: 2022-01-01

## 2022-01-01 RX ORDER — ATORVASTATIN CALCIUM 20 MG/1
20 TABLET, FILM COATED ORAL
Status: DISCONTINUED | OUTPATIENT
Start: 2022-01-01 | End: 2022-01-01 | Stop reason: HOSPADM

## 2022-01-01 RX ORDER — HEPARIN SODIUM 1000 [USP'U]/ML
1200 INJECTION, SOLUTION INTRAVENOUS; SUBCUTANEOUS
Status: DISCONTINUED | OUTPATIENT
Start: 2022-01-01 | End: 2022-01-01 | Stop reason: HOSPADM

## 2022-01-01 RX ORDER — LEVOTHYROXINE SODIUM 0.03 MG/1
25 TABLET ORAL
Status: DISCONTINUED | OUTPATIENT
Start: 2022-01-01 | End: 2022-01-01 | Stop reason: HOSPADM

## 2022-01-01 RX ORDER — FERROUS SULFATE 325(65) MG
325 TABLET ORAL
Status: DISCONTINUED | OUTPATIENT
Start: 2022-01-01 | End: 2022-01-01 | Stop reason: HOSPADM

## 2022-01-01 RX ORDER — CEFTRIAXONE 1 G/50ML
1000 INJECTION, SOLUTION INTRAVENOUS EVERY 24 HOURS
Status: DISCONTINUED | OUTPATIENT
Start: 2022-01-01 | End: 2022-01-01

## 2022-01-01 RX ORDER — HEPARIN SODIUM 1000 [USP'U]/ML
2400 INJECTION, SOLUTION INTRAVENOUS; SUBCUTANEOUS
Status: DISCONTINUED | OUTPATIENT
Start: 2022-01-01 | End: 2022-01-01 | Stop reason: HOSPADM

## 2022-01-01 RX ADMIN — REMDESIVIR 100 MG: 100 INJECTION, POWDER, LYOPHILIZED, FOR SOLUTION INTRAVENOUS at 14:25

## 2022-01-01 RX ADMIN — SERTRALINE HYDROCHLORIDE 25 MG: 50 TABLET ORAL at 09:35

## 2022-01-01 RX ADMIN — SERTRALINE HYDROCHLORIDE 25 MG: 50 TABLET ORAL at 10:23

## 2022-01-01 RX ADMIN — MIRTAZAPINE 15 MG: 15 TABLET, FILM COATED ORAL at 21:54

## 2022-01-01 RX ADMIN — SERTRALINE HYDROCHLORIDE 25 MG: 50 TABLET ORAL at 08:56

## 2022-01-01 RX ADMIN — HEPARIN SODIUM 2400 UNITS: 1000 INJECTION INTRAVENOUS; SUBCUTANEOUS at 16:57

## 2022-01-01 RX ADMIN — CEFTRIAXONE 1000 MG: 1 INJECTION, SOLUTION INTRAVENOUS at 20:30

## 2022-01-01 RX ADMIN — FERROUS SULFATE TAB 325 MG (65 MG ELEMENTAL FE) 325 MG: 325 (65 FE) TAB at 09:10

## 2022-01-01 RX ADMIN — HEPARIN SODIUM 1200 UNITS: 1000 INJECTION INTRAVENOUS; SUBCUTANEOUS at 19:13

## 2022-01-01 RX ADMIN — LEVOTHYROXINE SODIUM 25 MCG: 25 TABLET ORAL at 05:44

## 2022-01-01 RX ADMIN — SODIUM CHLORIDE 125 ML/HR: 0.9 INJECTION, SOLUTION INTRAVENOUS at 13:25

## 2022-01-01 RX ADMIN — FERROUS SULFATE TAB 325 MG (65 MG ELEMENTAL FE) 325 MG: 325 (65 FE) TAB at 10:23

## 2022-01-01 RX ADMIN — ENOXAPARIN SODIUM 30 MG: 100 INJECTION SUBCUTANEOUS at 14:58

## 2022-01-01 RX ADMIN — HEPARIN SODIUM 1200 UNITS: 1000 INJECTION INTRAVENOUS; SUBCUTANEOUS at 12:44

## 2022-01-01 RX ADMIN — DEXAMETHASONE SODIUM PHOSPHATE 6 MG: 4 INJECTION, SOLUTION INTRA-ARTICULAR; INTRALESIONAL; INTRAMUSCULAR; INTRAVENOUS; SOFT TISSUE at 17:51

## 2022-01-01 RX ADMIN — SENNOSIDES AND DOCUSATE SODIUM 1 TABLET: 50; 8.6 TABLET ORAL at 09:20

## 2022-01-01 RX ADMIN — LEVOTHYROXINE SODIUM 25 MCG: 25 TABLET ORAL at 04:14

## 2022-01-01 RX ADMIN — LEVOTHYROXINE SODIUM 25 MCG: 25 TABLET ORAL at 05:43

## 2022-01-01 RX ADMIN — SERTRALINE HYDROCHLORIDE 25 MG: 50 TABLET ORAL at 08:02

## 2022-01-01 RX ADMIN — DOXYCYCLINE 100 MG: 100 CAPSULE ORAL at 12:33

## 2022-01-01 RX ADMIN — HEPARIN SODIUM 16 UNITS/KG/HR: 10000 INJECTION, SOLUTION INTRAVENOUS at 00:15

## 2022-01-01 RX ADMIN — LEVOTHYROXINE SODIUM 25 MCG: 25 TABLET ORAL at 05:48

## 2022-01-01 RX ADMIN — FERROUS SULFATE TAB 325 MG (65 MG ELEMENTAL FE) 325 MG: 325 (65 FE) TAB at 08:02

## 2022-01-01 RX ADMIN — CEFTRIAXONE 1000 MG: 1 INJECTION, SOLUTION INTRAVENOUS at 21:11

## 2022-01-01 RX ADMIN — SENNOSIDES AND DOCUSATE SODIUM 1 TABLET: 50; 8.6 TABLET ORAL at 08:03

## 2022-01-01 RX ADMIN — DOXYCYCLINE 100 MG: 100 CAPSULE ORAL at 21:11

## 2022-01-01 RX ADMIN — DEXAMETHASONE SODIUM PHOSPHATE 6 MG: 4 INJECTION, SOLUTION INTRA-ARTICULAR; INTRALESIONAL; INTRAMUSCULAR; INTRAVENOUS; SOFT TISSUE at 14:01

## 2022-01-01 RX ADMIN — SENNOSIDES AND DOCUSATE SODIUM 1 TABLET: 50; 8.6 TABLET ORAL at 10:05

## 2022-01-01 RX ADMIN — FERROUS SULFATE TAB 325 MG (65 MG ELEMENTAL FE) 325 MG: 325 (65 FE) TAB at 09:38

## 2022-01-01 RX ADMIN — LEVOTHYROXINE SODIUM 25 MCG: 25 TABLET ORAL at 05:35

## 2022-01-01 RX ADMIN — FERROUS SULFATE TAB 325 MG (65 MG ELEMENTAL FE) 325 MG: 325 (65 FE) TAB at 07:42

## 2022-01-01 RX ADMIN — ATORVASTATIN CALCIUM 20 MG: 20 TABLET, FILM COATED ORAL at 16:13

## 2022-01-01 RX ADMIN — BARICITINIB 4 MG: 2 TABLET, FILM COATED ORAL at 15:35

## 2022-01-01 RX ADMIN — BARICITINIB 4 MG: 2 TABLET, FILM COATED ORAL at 18:50

## 2022-01-01 RX ADMIN — LEVOTHYROXINE SODIUM 25 MCG: 25 TABLET ORAL at 05:22

## 2022-01-01 RX ADMIN — LEVOTHYROXINE SODIUM 25 MCG: 25 TABLET ORAL at 05:23

## 2022-01-01 RX ADMIN — BARICITINIB 4 MG: 2 TABLET, FILM COATED ORAL at 16:18

## 2022-01-01 RX ADMIN — SENNOSIDES AND DOCUSATE SODIUM 1 TABLET: 50; 8.6 TABLET ORAL at 09:26

## 2022-01-01 RX ADMIN — SERTRALINE HYDROCHLORIDE 25 MG: 50 TABLET ORAL at 10:05

## 2022-01-01 RX ADMIN — FERROUS SULFATE TAB 325 MG (65 MG ELEMENTAL FE) 325 MG: 325 (65 FE) TAB at 09:35

## 2022-01-01 RX ADMIN — DEXAMETHASONE SODIUM PHOSPHATE 6 MG: 4 INJECTION, SOLUTION INTRA-ARTICULAR; INTRALESIONAL; INTRAMUSCULAR; INTRAVENOUS; SOFT TISSUE at 14:24

## 2022-01-01 RX ADMIN — FERROUS SULFATE TAB 325 MG (65 MG ELEMENTAL FE) 325 MG: 325 (65 FE) TAB at 08:26

## 2022-01-01 RX ADMIN — REMDESIVIR 200 MG: 100 INJECTION, POWDER, LYOPHILIZED, FOR SOLUTION INTRAVENOUS at 14:39

## 2022-01-01 RX ADMIN — SODIUM CHLORIDE 100 ML/HR: 0.9 INJECTION, SOLUTION INTRAVENOUS at 09:25

## 2022-01-01 RX ADMIN — DOXYCYCLINE 100 MG: 100 CAPSULE ORAL at 10:05

## 2022-01-01 RX ADMIN — ATORVASTATIN CALCIUM 20 MG: 20 TABLET, FILM COATED ORAL at 17:23

## 2022-01-01 RX ADMIN — REMDESIVIR 100 MG: 100 INJECTION, POWDER, LYOPHILIZED, FOR SOLUTION INTRAVENOUS at 14:06

## 2022-01-01 RX ADMIN — DEXAMETHASONE SODIUM PHOSPHATE 6 MG: 4 INJECTION, SOLUTION INTRA-ARTICULAR; INTRALESIONAL; INTRAMUSCULAR; INTRAVENOUS; SOFT TISSUE at 14:37

## 2022-01-01 RX ADMIN — HEPARIN SODIUM 1200 UNITS: 1000 INJECTION INTRAVENOUS; SUBCUTANEOUS at 17:31

## 2022-01-01 RX ADMIN — SERTRALINE HYDROCHLORIDE 25 MG: 50 TABLET ORAL at 09:05

## 2022-01-01 RX ADMIN — DEXAMETHASONE SODIUM PHOSPHATE 6 MG: 4 INJECTION, SOLUTION INTRA-ARTICULAR; INTRALESIONAL; INTRAMUSCULAR; INTRAVENOUS; SOFT TISSUE at 14:07

## 2022-01-01 RX ADMIN — MIRTAZAPINE 15 MG: 15 TABLET, FILM COATED ORAL at 21:11

## 2022-01-01 RX ADMIN — SODIUM CHLORIDE 100 ML/HR: 0.9 INJECTION, SOLUTION INTRAVENOUS at 01:40

## 2022-01-01 RX ADMIN — ATORVASTATIN CALCIUM 20 MG: 20 TABLET, FILM COATED ORAL at 15:35

## 2022-01-01 RX ADMIN — CEFTRIAXONE 1000 MG: 1 INJECTION, SOLUTION INTRAVENOUS at 21:20

## 2022-01-01 RX ADMIN — REMDESIVIR 100 MG: 100 INJECTION, POWDER, LYOPHILIZED, FOR SOLUTION INTRAVENOUS at 15:03

## 2022-01-01 RX ADMIN — HEPARIN SODIUM 1200 UNITS: 1000 INJECTION INTRAVENOUS; SUBCUTANEOUS at 17:26

## 2022-01-01 RX ADMIN — REMDESIVIR 100 MG: 100 INJECTION, POWDER, LYOPHILIZED, FOR SOLUTION INTRAVENOUS at 14:46

## 2022-01-01 RX ADMIN — DOXYCYCLINE 100 MG: 100 CAPSULE ORAL at 20:30

## 2022-01-01 RX ADMIN — MIRTAZAPINE 15 MG: 15 TABLET, FILM COATED ORAL at 21:43

## 2022-01-01 RX ADMIN — ATORVASTATIN CALCIUM 20 MG: 20 TABLET, FILM COATED ORAL at 17:01

## 2022-01-01 RX ADMIN — SENNOSIDES AND DOCUSATE SODIUM 1 TABLET: 50; 8.6 TABLET ORAL at 07:42

## 2022-01-01 RX ADMIN — LEVOTHYROXINE SODIUM 25 MCG: 25 TABLET ORAL at 06:12

## 2022-01-01 RX ADMIN — HEPARIN SODIUM 16 UNITS/KG/HR: 10000 INJECTION, SOLUTION INTRAVENOUS at 17:28

## 2022-01-01 RX ADMIN — SERTRALINE HYDROCHLORIDE 25 MG: 50 TABLET ORAL at 09:21

## 2022-01-01 RX ADMIN — MIRTAZAPINE 15 MG: 15 TABLET, FILM COATED ORAL at 21:47

## 2022-01-01 RX ADMIN — DOXYCYCLINE 100 MG: 100 CAPSULE ORAL at 21:43

## 2022-01-01 RX ADMIN — DEXAMETHASONE SODIUM PHOSPHATE 6 MG: 4 INJECTION, SOLUTION INTRA-ARTICULAR; INTRALESIONAL; INTRAMUSCULAR; INTRAVENOUS; SOFT TISSUE at 14:58

## 2022-01-01 RX ADMIN — HEPARIN SODIUM 12 UNITS/KG/HR: 10000 INJECTION, SOLUTION INTRAVENOUS at 02:25

## 2022-01-01 RX ADMIN — FERROUS SULFATE TAB 325 MG (65 MG ELEMENTAL FE) 325 MG: 325 (65 FE) TAB at 08:56

## 2022-01-01 RX ADMIN — HEPARIN SODIUM 1200 UNITS: 1000 INJECTION INTRAVENOUS; SUBCUTANEOUS at 06:48

## 2022-01-01 RX ADMIN — HEPARIN SODIUM 14 UNITS/KG/HR: 10000 INJECTION, SOLUTION INTRAVENOUS at 08:56

## 2022-01-01 RX ADMIN — ATORVASTATIN CALCIUM 20 MG: 20 TABLET, FILM COATED ORAL at 18:49

## 2022-01-01 RX ADMIN — CEFTRIAXONE 1000 MG: 1 INJECTION, SOLUTION INTRAVENOUS at 21:55

## 2022-01-01 RX ADMIN — ATORVASTATIN CALCIUM 20 MG: 20 TABLET, FILM COATED ORAL at 18:50

## 2022-01-01 RX ADMIN — MIRTAZAPINE 15 MG: 15 TABLET, FILM COATED ORAL at 23:02

## 2022-01-01 RX ADMIN — MIRTAZAPINE 15 MG: 15 TABLET, FILM COATED ORAL at 21:36

## 2022-01-01 RX ADMIN — HEPARIN SODIUM 18 UNITS/KG/HR: 10000 INJECTION, SOLUTION INTRAVENOUS at 19:14

## 2022-01-01 RX ADMIN — DEXAMETHASONE SODIUM PHOSPHATE 6 MG: 4 INJECTION, SOLUTION INTRA-ARTICULAR; INTRALESIONAL; INTRAMUSCULAR; INTRAVENOUS; SOFT TISSUE at 14:43

## 2022-01-01 RX ADMIN — ATORVASTATIN CALCIUM 20 MG: 20 TABLET, FILM COATED ORAL at 17:28

## 2022-01-01 RX ADMIN — BARICITINIB 4 MG: 2 TABLET, FILM COATED ORAL at 18:49

## 2022-01-01 RX ADMIN — FERROUS SULFATE TAB 325 MG (65 MG ELEMENTAL FE) 325 MG: 325 (65 FE) TAB at 10:05

## 2022-01-01 RX ADMIN — HEPARIN SODIUM 1200 UNITS: 1000 INJECTION INTRAVENOUS; SUBCUTANEOUS at 06:43

## 2022-01-01 RX ADMIN — FUROSEMIDE 20 MG: 10 INJECTION, SOLUTION INTRAVENOUS at 17:59

## 2022-01-01 RX ADMIN — FERROUS SULFATE TAB 325 MG (65 MG ELEMENTAL FE) 325 MG: 325 (65 FE) TAB at 09:00

## 2022-01-01 RX ADMIN — DOXYCYCLINE 100 MG: 100 CAPSULE ORAL at 21:54

## 2022-01-01 RX ADMIN — BARICITINIB 4 MG: 2 TABLET, FILM COATED ORAL at 16:13

## 2022-01-01 RX ADMIN — MIRTAZAPINE 15 MG: 15 TABLET, FILM COATED ORAL at 23:05

## 2022-01-01 RX ADMIN — MIRTAZAPINE 15 MG: 15 TABLET, FILM COATED ORAL at 22:05

## 2022-01-01 RX ADMIN — DEXAMETHASONE SODIUM PHOSPHATE 6 MG: 4 INJECTION, SOLUTION INTRA-ARTICULAR; INTRALESIONAL; INTRAMUSCULAR; INTRAVENOUS; SOFT TISSUE at 13:57

## 2022-01-01 RX ADMIN — CEFTRIAXONE 1000 MG: 1 INJECTION, SOLUTION INTRAVENOUS at 21:43

## 2022-01-01 RX ADMIN — SODIUM CHLORIDE 100 ML/HR: 0.9 INJECTION, SOLUTION INTRAVENOUS at 02:25

## 2022-01-01 RX ADMIN — SENNOSIDES AND DOCUSATE SODIUM 1 TABLET: 50; 8.6 TABLET ORAL at 08:56

## 2022-01-01 RX ADMIN — MIRTAZAPINE 15 MG: 15 TABLET, FILM COATED ORAL at 22:12

## 2022-01-01 RX ADMIN — LEVOTHYROXINE SODIUM 25 MCG: 25 TABLET ORAL at 06:30

## 2022-01-01 RX ADMIN — DEXAMETHASONE SODIUM PHOSPHATE 6 MG: 4 INJECTION, SOLUTION INTRA-ARTICULAR; INTRALESIONAL; INTRAMUSCULAR; INTRAVENOUS; SOFT TISSUE at 18:49

## 2022-01-01 RX ADMIN — DOXYCYCLINE 100 MG: 100 CAPSULE ORAL at 09:26

## 2022-01-01 RX ADMIN — DOXYCYCLINE 100 MG: 100 CAPSULE ORAL at 07:42

## 2022-01-01 RX ADMIN — DOXYCYCLINE 100 MG: 100 CAPSULE ORAL at 08:03

## 2022-01-01 RX ADMIN — ATORVASTATIN CALCIUM 20 MG: 20 TABLET, FILM COATED ORAL at 16:50

## 2022-01-01 RX ADMIN — MIRTAZAPINE 15 MG: 15 TABLET, FILM COATED ORAL at 21:44

## 2022-01-01 RX ADMIN — CEFEPIME HYDROCHLORIDE 2000 MG: 2 INJECTION, SOLUTION INTRAVENOUS at 13:23

## 2022-01-01 RX ADMIN — ATORVASTATIN CALCIUM 20 MG: 20 TABLET, FILM COATED ORAL at 17:51

## 2022-01-01 RX ADMIN — LEVOTHYROXINE SODIUM 25 MCG: 25 TABLET ORAL at 05:57

## 2022-01-01 RX ADMIN — SENNOSIDES AND DOCUSATE SODIUM 1 TABLET: 50; 8.6 TABLET ORAL at 10:23

## 2022-01-01 RX ADMIN — BARICITINIB 4 MG: 2 TABLET, FILM COATED ORAL at 17:28

## 2022-01-01 RX ADMIN — BARICITINIB 4 MG: 2 TABLET, FILM COATED ORAL at 17:50

## 2022-01-01 RX ADMIN — ACETAMINOPHEN 650 MG: 325 TABLET, FILM COATED ORAL at 03:41

## 2022-01-01 RX ADMIN — SENNOSIDES AND DOCUSATE SODIUM 1 TABLET: 50; 8.6 TABLET ORAL at 08:27

## 2022-01-01 RX ADMIN — SENNOSIDES AND DOCUSATE SODIUM 1 TABLET: 50; 8.6 TABLET ORAL at 09:00

## 2022-01-01 RX ADMIN — SENNOSIDES AND DOCUSATE SODIUM 1 TABLET: 50; 8.6 TABLET ORAL at 09:05

## 2022-01-01 RX ADMIN — SERTRALINE HYDROCHLORIDE 25 MG: 50 TABLET ORAL at 07:42

## 2022-01-01 RX ADMIN — SODIUM CHLORIDE 100 ML/HR: 0.9 INJECTION, SOLUTION INTRAVENOUS at 15:30

## 2022-01-01 RX ADMIN — ATORVASTATIN CALCIUM 20 MG: 20 TABLET, FILM COATED ORAL at 18:20

## 2022-01-01 RX ADMIN — SERTRALINE HYDROCHLORIDE 25 MG: 50 TABLET ORAL at 09:26

## 2022-01-01 RX ADMIN — BARICITINIB 4 MG: 2 TABLET, FILM COATED ORAL at 17:58

## 2022-01-01 RX ADMIN — BARICITINIB 4 MG: 2 TABLET, FILM COATED ORAL at 17:23

## 2022-01-01 RX ADMIN — SENNOSIDES AND DOCUSATE SODIUM 1 TABLET: 50; 8.6 TABLET ORAL at 09:35

## 2022-01-01 RX ADMIN — DEXAMETHASONE SODIUM PHOSPHATE 6 MG: 4 INJECTION, SOLUTION INTRA-ARTICULAR; INTRALESIONAL; INTRAMUSCULAR; INTRAVENOUS; SOFT TISSUE at 14:03

## 2022-01-01 RX ADMIN — SERTRALINE HYDROCHLORIDE 25 MG: 50 TABLET ORAL at 08:26

## 2022-01-01 RX ADMIN — FERROUS SULFATE TAB 325 MG (65 MG ELEMENTAL FE) 325 MG: 325 (65 FE) TAB at 09:22

## 2022-01-01 RX ADMIN — SERTRALINE HYDROCHLORIDE 25 MG: 50 TABLET ORAL at 09:00

## 2022-01-01 RX ADMIN — HEPARIN SODIUM 16 UNITS/KG/HR: 10000 INJECTION, SOLUTION INTRAVENOUS at 02:45

## 2022-01-01 RX ADMIN — HEPARIN SODIUM 17 UNITS/KG/HR: 10000 INJECTION, SOLUTION INTRAVENOUS at 00:13

## 2022-02-20 PROBLEM — U07.1 COVID-19: Status: ACTIVE | Noted: 2022-01-01

## 2022-02-20 PROBLEM — J96.21 ACUTE ON CHRONIC RESPIRATORY FAILURE WITH HYPOXIA (HCC): Status: ACTIVE | Noted: 2022-01-01

## 2022-02-20 PROBLEM — A41.9 SEPSIS (HCC): Status: ACTIVE | Noted: 2022-01-01

## 2022-02-20 NOTE — ASSESSMENT & PLAN NOTE
· At baseline uses 2L NC  · Requiring 5-6L NC at time of admission due to pneumonia  · Continue to treat pneumonia

## 2022-02-20 NOTE — ASSESSMENT & PLAN NOTE
· Positive 2/20 in ED  · Admit under moderate pathway, requiring 5-6L at time of admission  · Continue Rocephin given elevated procalcitonin  · Continue Dexamethasone and Remdesivir

## 2022-02-20 NOTE — ASSESSMENT & PLAN NOTE
Malnutrition Findings:           BMI Findings: There is no height or weight on file to calculate BMI     · Dietary supplemenets

## 2022-02-20 NOTE — ASSESSMENT & PLAN NOTE
· CXR revealing multifocal opacities  · COVID positive  · Procalcitonin 0 49  · Treat for suspected superimposed bacterial infection

## 2022-02-20 NOTE — QUICK NOTE
QUICK NOTE - Deterioration Index  Turner Orantes 80 y o  female MRN: [id number]  Unit/Bed#: -01 Encounter: [id number]    Date Paged: 22  Time Paged: 173  Room #: 558  Arrival Time: 1749  Deterioration index score at time of page: 65 3  %  Spoke with Norma lester from primary team  Need to escalate level of care: no     PROBLEMS resulting in high DI score: Factors Contributing to Score    Contribution Factor Value   27% Respiratory rate 30   20% Age 87   17% Supplemental oxygen Nasal cannula   10% [place] coma scale 14   7% Pulse oximetry 88 %   6% WBC count 29 17 Thousand/uL   6% Cardiac rhythm Sinus tachycardia   2% Blood pH 7 42     Contribution Factor Value   2% Systolic 315   1% Hematocrit 30 %   1% Platelet count 276 Thousands/uL   1% Pulse 89   <1% Sodium 140 mmol/L   <1% Temperature 98 5 °F (36 9 °C)   <1% Potassium 4 mmol/L                   PLAN:     Will place on midflow for sats 90%  Resp emilee aware if further concerns will notify CC ap    Please contact critical care via [employer] with any questions or concerns       Vitals:   Vitals:    22 1400 22 1430 22 1531 22 1533   BP: 125/58 120/56 131/61 131/61   Pulse: 86 88  86   Resp: (!) 30 (!) 30     Temp:   98 5 °F (36 9 °C) 98 5 °F (36 9 °C)   SpO2: (!) 89% 91%  (!) 89%       Respiratory:  SpO2: SpO2: (!) 89 %, SpO2 Activity: SpO2 Activity: At Rest, SpO2 Device: O2 Device: Nasal cannula  Nasal Cannula O2 Flow Rate (L/min): 5 L/min    Temperature: Temp (24hrs), Av 2 °F (36 8 °C), Min:97 6 °F (36 4 °C), Max:98 5 °F (36 9 °C)  Current: Temperature: 98 5 °F (36 9 °C)    Labs:   Results from last 7 days   Lab Units 22  1155 22  1154   WBC Thousand/uL  --  29 17*   HEMOGLOBIN g/dL  --  9 5*   I STAT HEMOGLOBIN g/dl 10 2*  --    HEMATOCRIT %  --  31 4*   HEMATOCRIT, ISTAT % 30*  --    PLATELETS Thousands/uL  --  630*   MONO PCT %  --  1*     Results from last 7 days   Lab Units 22  6295 02/20/22  1154   SODIUM mmol/L  --  140   POTASSIUM mmol/L  --  4 0   CHLORIDE mmol/L  --  98*   CO2 mmol/L  --  30   CO2, I-STAT mmol/L 34*  --    BUN mg/dL  --  20   CREATININE mg/dL  --  0 78   CALCIUM mg/dL  --  8 6   ALK PHOS U/L  --  144*   ALT U/L  --  71   AST U/L  --  69*   GLUCOSE, ISTAT mg/dl 197*  --          Results from last 7 days   Lab Units 02/20/22  1154   LACTIC ACID mmol/L 1 9         Results from last 7 days   Lab Units 02/20/22  1154   PROCALCITONIN ng/ml 0 49*       Code Status: Level 1 - Full Code

## 2022-02-20 NOTE — ASSESSMENT & PLAN NOTE
· WBC 29K on admission   · Baseline around 20-25 for patient  · Continue outpatient follow up  · Palliative Care consult requested by family

## 2022-02-20 NOTE — ASSESSMENT & PLAN NOTE
· At baseline uses 2L NC  · Requiring 5-6L at time of admission  · Continue dexamethasone as part of COVID protocol

## 2022-02-20 NOTE — ED PROVIDER NOTES
History  Chief Complaint   Patient presents with    Altered Mental Status     Patient presents to ED with decreased appetite, weakness, altered mental status, more confused for the past week and worsening  Patient with pmh pneumonia, copd, lymphoma, chronic 2 liters oxygen at night brought in by ambulance from home where she lives with family for confusion, weakness over last week and then decreased oxygen 88% on 5 liters at home yesterday and today 83%  She has cough and sob  DNI/ DNR          Prior to Admission Medications   Prescriptions Last Dose Informant Patient Reported? Taking? Albuterol Sulfate 108 (90 Base) MCG/ACT AEPB   Yes Yes   Sig: Inhale 2 puffs   Calcium Carbonate-Vitamin D 600-200 MG-UNIT TABS   Yes No   Sig: Take 500 mg by mouth    Multiple Vitamin (multivitamin) capsule   Yes No   Sig: Take 1 capsule by mouth daily   atorvastatin (LIPITOR) 20 mg tablet   Yes No   Sig: Take 20 mg by mouth   co-enzyme Q-10 30 MG capsule   Yes No   Sig: Take 30 mg by mouth 3 (three) times a day   ferrous sulfate 325 (65 Fe) mg tablet   Yes No   Sig: Take 325 mg by mouth daily with breakfast   levothyroxine 25 mcg tablet   Yes No   Si 5 mcg    mirtazapine (REMERON) 7 5 MG tablet   Yes No   Sig: Take 15 mg by mouth     senna-docusate sodium (SENOKOT S) 8 6-50 mg per tablet   Yes No   Sig: Take 1 tablet by mouth daily   sertraline (ZOLOFT) 25 mg tablet   Yes No   Sig: Take 25 mg by mouth daily      Facility-Administered Medications: None       Past Medical History:   Diagnosis Date    Anemia     Blind right eye     COPD (chronic obstructive pulmonary disease) (HCC)     COPD (chronic obstructive pulmonary disease) (HCC)     Disease of thyroid gland     Hypothyroidism     TIA (transient ischemic attack)        Past Surgical History:   Procedure Laterality Date    HIP FRACTURE SURGERY Bilateral        No family history on file    I have reviewed and agree with the history as documented  E-Cigarette/Vaping    E-Cigarette Use Never User      E-Cigarette/Vaping Substances    Nicotine No     THC No     CBD No     Flavoring No     Other No     Unknown No      Social History     Tobacco Use    Smoking status: Never Smoker    Smokeless tobacco: Never Used   Vaping Use    Vaping Use: Never used   Substance Use Topics    Alcohol use: Not Currently    Drug use: Not Currently       Review of Systems   Unable to perform ROS: Dementia   All other systems reviewed and are negative  Physical Exam  Physical Exam  Vitals and nursing note reviewed  Constitutional:       Appearance: She is cachectic  She is ill-appearing  HENT:      Head: Normocephalic and atraumatic  Right Ear: Tympanic membrane and external ear normal       Left Ear: Tympanic membrane and external ear normal       Ears:      Comments: Hard of hearing     Nose: Nose normal       Mouth/Throat:      Mouth: Mucous membranes are dry  Eyes:      Conjunctiva/sclera: Conjunctivae normal       Pupils: Pupils are equal, round, and reactive to light  Cardiovascular:      Rate and Rhythm: Normal rate and regular rhythm  Heart sounds: Normal heart sounds  Pulmonary:      Effort: Pulmonary effort is normal  Tachypnea present  No respiratory distress  Breath sounds: Wheezing present  Abdominal:      General: Bowel sounds are normal  There is no distension  Palpations: Abdomen is soft  Tenderness: There is no abdominal tenderness  Musculoskeletal:         General: No deformity  Normal range of motion  Cervical back: Normal range of motion and neck supple  No spinous process tenderness  Skin:     General: Skin is warm and dry  Findings: No rash  Neurological:      General: No focal deficit present  Mental Status: She is alert  She is disoriented  GCS: GCS eye subscore is 4  GCS verbal subscore is 5  GCS motor subscore is 6  Cranial Nerves: No cranial nerve deficit  Sensory: Sensation is intact  No sensory deficit  Motor: Motor function is intact     Psychiatric:         Mood and Affect: Mood normal          Vital Signs  ED Triage Vitals   Temperature Pulse Respirations Blood Pressure SpO2   02/20/22 1154 02/20/22 1148 02/20/22 1150 02/20/22 1151 02/20/22 1148   97 6 °F (36 4 °C) 99 (!) 26 141/60 (!) 73 %      Temp src Heart Rate Source Patient Position - Orthostatic VS BP Location FiO2 (%)   -- 02/20/22 1150 -- -- --    Monitor         Pain Score       --                  Vitals:    02/20/22 1400 02/20/22 1430 02/20/22 1531 02/20/22 1533   BP: 125/58 120/56 131/61 131/61   Pulse: 86 88  86         Visual Acuity  Visual Acuity      Most Recent Value   L Pupil Size (mm) 4   R Pupil Size (mm) 4          ED Medications  Medications   atorvastatin (LIPITOR) tablet 20 mg (has no administration in time range)   ferrous sulfate tablet 325 mg (has no administration in time range)   levothyroxine tablet 25 mcg (has no administration in time range)   mirtazapine (REMERON) tablet 15 mg (has no administration in time range)   senna-docusate sodium (SENOKOT S) 8 6-50 mg per tablet 1 tablet (has no administration in time range)   sertraline (ZOLOFT) tablet 25 mg (has no administration in time range)   acetaminophen (TYLENOL) tablet 650 mg (has no administration in time range)   sodium chloride 0 9 % infusion (has no administration in time range)   enoxaparin (LOVENOX) subcutaneous injection 40 mg (has no administration in time range)   cefTRIAXone (ROCEPHIN) IVPB (premix in dextrose) 1,000 mg 50 mL (has no administration in time range)   dexamethasone (DECADRON) injection 6 mg (6 mg Intravenous Given 2/20/22 1458)   enoxaparin (LOVENOX) subcutaneous injection 30 mg (30 mg Subcutaneous Given 2/20/22 1458)   remdesivir (Veklury) 200 mg in sodium chloride 0 9 % 290 mL IVPB (200 mg Intravenous Given 2/20/22 1439)     Followed by   remdesivir [place]) 100 mg in sodium chloride 0 9 % 270 mL IVPB (has no administration in time range)   sodium chloride 0 9 % infusion (125 mL/hr Intravenous New Bag 2/20/22 1325)   cefepime (MAXIPIME) IVPB (premix in dextrose) 2,000 mg 50 mL (0 mg Intravenous Stopped 2/20/22 1359)       Diagnostic Studies  Results Reviewed     Procedure Component Value Units Date/Time    HS Troponin I 4hr [[id number]] Collected: 02/20/22 1626    Lab Status: In process Specimen: Blood from Arm, Right Updated: 02/20/22 1632    HS Troponin I 2hr [[id number]]  (Normal) Collected: 02/20/22 1457    Lab Status: Final result Specimen: Blood from Arm, Left Updated: 02/20/22 1545     hs TnI 2hr 12 ng/L      Delta 2hr hsTnI -3 ng/L     D-dimer, quantitative [[id number]]  (Abnormal) Collected: 02/20/22 1154    Lab Status: Final result Specimen: Blood from Arm, Left Updated: 02/20/22 1429     D-Dimer, Quant 2 96 ug/ml FEU     Narrative: In the evaluation for possible pulmonary embolism, in the appropriate (Well's Score of 4 or less) patient, the age adjusted d-dimer cutoff for this patient can be calculated as:    Age x 0 01 (in ug/mL) for Age-adjusted D-dimer exclusion threshold for a patient over 50 years  COVID/FLU/RSV - 2 hour TAT [[id number]]  (Abnormal) Collected: 02/20/22 1322    Lab Status: Final result Specimen: Nares from Nasopharyngeal Swab Updated: 02/20/22 1412     SARS-CoV-2 Positive     INFLUENZA A PCR Negative     INFLUENZA B PCR Negative     RSV PCR Negative    Narrative:      FOR PEDIATRIC PATIENTS - copy/paste COVID Guidelines URL to browser: [email or web address]    SARS-CoV-2 assay is a Nucleic Acid Amplification assay intended for the  qualitative detection of nucleic acid from SARS-CoV-2 in nasopharyngeal  swabs  Results are for the presumptive identification of SARS-CoV-2 RNA      Positive results are indicative of infection with SARS-CoV-2, the virus  causing COVID-19, but do not rule out bacterial infection or co-infection  with other viruses  Laboratories within the [place] and its  territories are required to report all positive results to the appropriate  public health authorities  Negative results do not preclude SARS-CoV-2  infection and should not be used as the sole basis for treatment or other  patient management decisions  Negative results must be combined with  clinical observations, patient history, and epidemiological information  This test has not been FDA cleared or approved  This test has been authorized by FDA under an Emergency Use Authorization  (EUA)  This test is only authorized for the duration of time the  declaration that circumstances exist justifying the authorization of the  emergency use of an in vitro diagnostic tests for detection of SARS-CoV-2  virus and/or diagnosis of COVID-19 infection under section 564(b)(1) of  the Act, 21 U  S C  625MKJ-0(L)(3), unless the authorization is terminated  or revoked sooner  The test has been validated but independent review by FDA  and CLIA is pending  Test performed using [employer] GeneXpert: This RT-PCR assay targets N2,  a region unique to SARS-CoV-2  A conserved region in the E-gene was chosen  for pan-Sarbecovirus detection which includes SARS-CoV-2      Urine Microscopic [[id number]]  (Normal) Collected: 02/20/22 1313    Lab Status: Final result Specimen: Urine, Clean Catch Updated: 02/20/22 1404     RBC, UA None Seen /hpf      WBC, UA 1-2 /hpf      Epithelial Cells Occasional /hpf      Bacteria, UA Occasional /hpf     Narrative:      UNSPUN MICROSCOPIC URINE, QNS FOR CONCENTRATED MICROSCOPIC     UA w Reflex to Microscopic w Reflex to Culture [[id number]]  (Abnormal) Collected: 02/20/22 1313    Lab Status: Final result Specimen: Urine, Clean Catch Updated: 02/20/22 1341     Color, UA Yellow     Clarity, UA Clear     Specific [place], UA 1 020     pH, UA 6 0     Leukocytes, UA Small     Nitrite, UA Negative     Protein, UA 30 (1+) mg/dl Glucose, UA Negative mg/dl      Ketones, UA Negative mg/dl      Urobilinogen, UA 0 2 E U /dl      Bilirubin, UA Negative     Blood, UA Trace-lysed    CBC and differential [[id number]]  (Abnormal) Collected: 02/20/22 1154    Lab Status: Final result Specimen: Blood from Arm, Left Updated: 02/20/22 1231     WBC 29 17 Thousand/uL      RBC 3 77 Million/uL      Hemoglobin 9 5 g/dL      Hematocrit 31 4 %      MCV 83 fL      MCH 25 2 pg      MCHC 30 3 g/dL      RDW 16 5 %      MPV 8 9 fL      Platelets 240 Thousands/uL     Narrative: This is an appended report  These results have been appended to a previously verified report      Manual Differential(PHLEBS Do Not Order) [[id number]]  (Abnormal) Collected: 02/20/22 1154    Lab Status: Final result Specimen: Blood from Arm, Left Updated: 02/20/22 1231     Segmented % 38 %      Bands % 3 %      Lymphocytes % 57 %      Monocytes % 1 %      Eosinophils, % 0 %      Basophils % 0 %      Metamyelocytes% 1 %      Absolute Neutrophils 11 96 Thousand/uL      Lymphocytes Absolute 16 63 Thousand/uL      Monocytes Absolute 0 29 Thousand/uL      Eosinophils Absolute 0 00 Thousand/uL      Basophils Absolute 0 00 Thousand/uL      Total Counted --     Platelet Estimate Increased    APTT [[id number]]  (Normal) Collected: 02/20/22 1154    Lab Status: Final result Specimen: Blood from Arm, Left Updated: 02/20/22 1230     PTT 34 seconds     Protime-INR [[id number]]  (Normal) Collected: 02/20/22 1154    Lab Status: Final result Specimen: Blood from Arm, Left Updated: 02/20/22 1230     Protime 13 2 seconds      INR 1 01    Procalcitonin with AM Reflex [[id number]]  (Abnormal) Collected: 02/20/22 1154    Lab Status: Final result Specimen: Blood from Arm, Left Updated: 02/20/22 1229     Procalcitonin 0 49 ng/ml     Procalcitonin Reflex [[id number]]     Lab Status: No result Specimen: Blood     HS Troponin 0hr (reflex protocol) [[id number]]  (Normal) Collected: 02/20/22 1154    Lab Status: Final result Specimen: Blood from Arm, Left Updated: 02/20/22 1226     hs TnI 0hr 15 ng/L     Lactic acid [[id number]]  (Normal) Collected: 02/20/22 1154    Lab Status: Final result Specimen: Blood from Arm, Left Updated: 02/20/22 1221     LACTIC ACID 1 9 mmol/L     Narrative:      Result may be elevated if tourniquet was used during collection  Comprehensive metabolic panel [[id number]]  (Abnormal) Collected: 02/20/22 1154    Lab Status: Final result Specimen: Blood from Arm, Left Updated: 02/20/22 1218     Sodium 140 mmol/L      Potassium 4 0 mmol/L      Chloride 98 mmol/L      CO2 30 mmol/L      ANION GAP 12 mmol/L      BUN 20 mg/dL      Creatinine 0 78 mg/dL      Glucose 198 mg/dL      Calcium 8 6 mg/dL      Corrected Calcium 9 7 mg/dL      AST 69 U/L      ALT 71 U/L      Alkaline Phosphatase 144 U/L      Total Protein 6 6 g/dL      Albumin 2 6 g/dL      Total Bilirubin 0 40 mg/dL      eGFR 68 ml/min/1 73sq m     Narrative:      [place] guidelines for Chronic Kidney Disease (CKD):     Stage 1 with normal or high GFR (GFR > 90 mL/min/1 73 square meters)    Stage 2 Mild CKD (GFR = 60-89 mL/min/1 73 square meters)    Stage 3A Moderate CKD (GFR = 45-59 mL/min/1 73 square meters)    Stage 3B Moderate CKD (GFR = 30-44 mL/min/1 73 square meters)    Stage 4 Severe CKD (GFR = 15-29 mL/min/1 73 square meters)    Stage 5 End Stage CKD (GFR <15 mL/min/1 73 square meters)  Note: GFR calculation is accurate only with a steady state creatinine    Blood culture #1 [[id number]] Collected: 02/20/22 1158    Lab Status:  In process Specimen: Blood from Arm, Left Updated: 02/20/22 1205    POCT Blood Gas (CG8+) [[id number]]  (Abnormal) Collected: 02/20/22 1155    Lab Status: Final result Specimen: Venous Updated: 02/20/22 1159     ph, Ian ISTAT 7 420     pCO2, Ian i-STAT 49 9 mm HG      pO2, Ian i-STAT 19 0 mm HG      BE, i-STAT 7 mmol/L      HCO3, Ian i-STAT 32 3 mmol/L      CO2, i-STAT 34 mmol/L      O2 Sat, i-STAT 29 %      SODIUM, I-STAT 135 mmol/l      Potassium, i-STAT 4 0 mmol/L      Calcium, Ionized i-STAT 1 13 mmol/L      Hct, i-STAT 30 %      Hgb, i-STAT 10 2 g/dl      Glucose, i-STAT 197 mg/dl      Specimen Type VENOUS    Blood culture #2 [[id number]] Collected: 02/20/22 1154    Lab Status: In process Specimen: Blood from Arm, Left Updated: 02/20/22 1159                 XR chest 1 view portable   ED Interpretation by Caron Ram DO (02/20 1247)   Bilateral infiltrates      Final Result by Owen Castaneda MD (02/20 7823)      Right greater than left multifocal airspace opacities suspicious for multifocal pneumonia versus asymmetric pulmonary edema                    Workstation performed: [id number]                    Procedures  ECG 12 Lead Documentation Only    Date/Time: 2/20/2022 3:26 PM  Performed by: Caron Ram DO  Authorized by: Caron Ram DO     Indications / Diagnosis:  Sob  ECG reviewed by me, the ED Provider: yes    Patient location:  ED  Interpretation:     Interpretation: normal    Rate:     ECG rate:  11-21    ECG rate assessment: normal    Rhythm:     Rhythm: sinus rhythm    Ectopy:     Ectopy: none    QRS:     QRS axis:  Right    QRS intervals:  Normal  Conduction:     Conduction: normal    ST segments:     ST segments:  Normal  T waves:     T waves: normal      CriticalCare Time  Performed by: Caron Ram DO  Authorized by: Caron Ram DO     Critical care provider statement:     Critical care time (minutes):  30    Critical care time was exclusive of:  Separately billable procedures and treating other patients and teaching time    Critical care was necessary to treat or prevent imminent or life-threatening deterioration of the following conditions:  Respiratory failure and sepsis    Critical care was time spent personally by me on the following activities:  Obtaining history from patient or surrogate, development of treatment plan with patient or surrogate, discussions with consultants, evaluation of patient's response to treatment, examination of patient, review of old charts, re-evaluation of patient's condition, ordering and review of radiographic studies, ordering and review of laboratory studies and ordering and performing treatments and interventions             ED Course  ED Course as of 02/20/22 1642   Sun Feb 20, 2022   1244 Hasn't been using her albuterol inhaler at home                                             MDM  Number of Diagnoses or Management Options  Acute respiratory distress: new and requires workup  Altered mental status: new and requires workup  COVID-19: new and requires workup  Pneumonia: new and requires workup     Amount and/or Complexity of Data Reviewed  Clinical lab tests: ordered and reviewed  Tests in the radiology section of CPT®: ordered and reviewed  Obtain history from someone other than the patient: yes  Discuss the patient with other providers: yes    Patient Progress  Patient progress: improved      Disposition  Final diagnoses:    Altered mental status   Pneumonia - acute   Acute respiratory distress   COVID-19     Time reflects when diagnosis was documented in both MDM as applicable and the Disposition within this note     Time User Action Codes Description Comment    2/20/2022  1:23 PM Juni Media Add [R41 82] Altered mental status     2/20/2022  1:23 PM Juni Media Add [J18 9] Pneumonia     2/20/2022  1:23 PM Juni Media Add [R06 03] Acute respiratory distress     2/20/2022  1:23 PM Genesisie Lose [J18 9] Pneumonia acute    2/20/2022  2:45 PM Jo-Ann Mendoza Add [J96 21] Acute on chronic respiratory failure with hypoxia ([facility] [address] )     2/20/2022  2:45 PM Leonid Yañez Add [C85 90] Lymphoma, unspecified body region, unspecified [facility] type [facility]     2/20/2022  4:41 PM Juni Media Add [U07 1] COVID-19       ED Disposition     ED Disposition Condition Date/Time Comment    Admit Ferny Gayle Feb 20, 2022  1:23 PM Case was discussed with Nella Dyson and the patient's admission status was agreed to be Admission Status: inpatient status to the service of Dr Miracle Amado   Follow-up Information    None         Current Discharge Medication List      CONTINUE these medications which have NOT CHANGED    Details   Albuterol Sulfate 108 (90 Base) MCG/ACT AEPB Inhale 2 puffs      atorvastatin (LIPITOR) 20 mg tablet Take 20 mg by mouth      Calcium Carbonate-Vitamin D 600-200 MG-UNIT TABS Take 500 mg by mouth       co-enzyme Q-10 30 MG capsule Take 30 mg by mouth 3 (three) times a day      ferrous sulfate 325 (65 Fe) mg tablet Take 325 mg by mouth daily with breakfast      levothyroxine 25 mcg tablet 37 5 mcg       mirtazapine (REMERON) 7 5 MG tablet Take 15 mg by mouth        Multiple Vitamin (multivitamin) capsule Take 1 capsule by mouth daily      senna-docusate sodium (SENOKOT S) 8 6-50 mg per tablet Take 1 tablet by mouth daily      sertraline (ZOLOFT) 25 mg tablet Take 25 mg by mouth daily             No discharge procedures on file      PDMP Review     None          ED Provider  Electronically Signed by           Aidee Robertson DO  02/20/22 0921

## 2022-02-20 NOTE — ED NOTES
Pt lives with bree who provides care along with home health nurses  Pt has not been feeling well for a few days  Pt typically wears 2l nasal cannula at night but had to increase it to 5l  Per pt bree she normal is oriented x3 but today says she saw her brother who has passed and thinks it is night time but its day   Grand-daughter said she wanted to discuss paletive care due to her not wanting treatment for pt cancer       Artem Hernandez RN  02/20/22 [phone]

## 2022-02-20 NOTE — PLAN OF CARE
Problem: MOBILITY - ADULT  Goal: Maintain or return to baseline ADL function  Description: INTERVENTIONS:  -  Assess patient's ability to carry out ADLs; assess patient's baseline for ADL function and identify physical deficits which impact ability to perform ADLs (bathing, care of mouth/teeth, toileting, grooming, dressing, etc )  - Assess/evaluate cause of self-care deficits   - Assess range of motion  - Assess patient's mobility; develop plan if impaired  - Assess patient's need for assistive devices and provide as appropriate  - Encourage maximum independence but intervene and supervise when necessary  - Involve family in performance of ADLs  - Assess for home care needs following discharge   - Consider OT consult to assist with ADL evaluation and planning for discharge  - Provide patient education as appropriate  2/20/2022 1845 by Audra Jo RN  Outcome: Progressing  2/20/2022 1844 by Audra Jo RN  Outcome: Progressing  Goal: Maintains/Returns to pre admission functional level  Description: INTERVENTIONS:  - Perform BMAT or MOVE assessment daily    - Set and communicate daily mobility goal to care team and patient/family/caregiver  - Collaborate with rehabilitation services on mobility goals if consulted  - Perform Range of Motion 2 times a day  - Reposition patient every 2 hours    - Dangle patient 2 times a day  - Stand patient 2 times a day  - Ambulate patient 2 times a day  - Out of bed to chair 2 times a day   - Out of bed for meals 2 times a day  - Out of bed for toileting  - Record patient progress and toleration of activity level   2/20/2022 1845 by Audra Jo RN  Outcome: Progressing  2/20/2022 1844 by Audra Jo RN  Outcome: Progressing     Problem: Potential for Falls  Goal: Patient will remain free of falls  Description: INTERVENTIONS:  -  Assess patient's ability to carry out ADLs; assess patient's baseline for ADL function and identify physical deficits which impact ability to perform ADLs (bathing, care of mouth/teeth, toileting, grooming, dressing, etc )  - Assess/evaluate cause of self-care deficits   - Assess range of motion  - Assess patient's mobility; develop plan if impaired  - Assess patient's need for assistive devices and provide as appropriate  - Encourage maximum independence but intervene and supervise when necessary  - Involve family in performance of ADLs  - Assess for home care needs following discharge   - Consider OT consult to assist with ADL evaluation and planning for discharge  - Provide patient education as appropriate  2/20/2022 1845 by Vanessa Park RN  Outcome: Progressing  2/20/2022 1844 by Vanessa Park RN  Outcome: Progressing     Problem: PAIN - ADULT  Goal: Verbalizes/displays adequate comfort level or baseline comfort level  Description: Interventions:  - Encourage patient to monitor pain and request assistance  - Assess pain using appropriate pain scale  - Administer analgesics based on type and severity of pain and evaluate response  - Implement non-pharmacological measures as appropriate and evaluate response  - Consider cultural and social influences on pain and pain management  - Notify physician/advanced practitioner if interventions unsuccessful or patient reports new pain  2/20/2022 1845 by Vanessa Park RN  Outcome: Progressing  2/20/2022 1844 by Vanessa Park RN  Outcome: Progressing     Problem: INFECTION - ADULT  Goal: Absence or prevention of progression during hospitalization  Description: INTERVENTIONS:  - Assess and monitor for signs and symptoms of infection  - Monitor lab/diagnostic results  - Monitor all insertion sites, i e  indwelling lines, tubes, and drains  - Monitor endotracheal if appropriate and nasal secretions for changes in amount and color  - [place] appropriate cooling/warming therapies per order  - Administer medications as ordered  - Instruct and encourage patient and family to use good hand hygiene technique  - Identify and instruct in appropriate isolation precautions for identified infection/condition  2/20/2022 1845 by Zackary Huber RN  Outcome: Progressing  2/20/2022 1844 by Zackary Huber RN  Outcome: Progressing  Goal: Absence of fever/infection during neutropenic period  Description: INTERVENTIONS:  - Monitor WBC    2/20/2022 1845 by Zackary Huber RN  Outcome: Progressing  2/20/2022 1844 by Zackary Huber RN  Outcome: Progressing     Problem: SAFETY ADULT  Goal: Maintain or return to baseline ADL function  Description: INTERVENTIONS:  -  Assess patient's ability to carry out ADLs; assess patient's baseline for ADL function and identify physical deficits which impact ability to perform ADLs (bathing, care of mouth/teeth, toileting, grooming, dressing, etc )  - Assess/evaluate cause of self-care deficits   - Assess range of motion  - Assess patient's mobility; develop plan if impaired  - Assess patient's need for assistive devices and provide as appropriate  - Encourage maximum independence but intervene and supervise when necessary  - Involve family in performance of ADLs  - Assess for home care needs following discharge   - Consider OT consult to assist with ADL evaluation and planning for discharge  - Provide patient education as appropriate  2/20/2022 1845 by Zackary Huber RN  Outcome: Progressing  2/20/2022 1844 by Zackary Huber RN  Outcome: Progressing  Goal: Maintains/Returns to pre admission functional level  Description: INTERVENTIONS:  - Perform BMAT or MOVE assessment daily    - Set and communicate daily mobility goal to care team and patient/family/caregiver  - Collaborate with rehabilitation services on mobility goals if consulted  - Perform Range of Motion 2 times a day  - Reposition patient every 2 hours    - Dangle patient 2 times a day  - Stand patient 2 times a day  - Ambulate patient 2 times a day  - Out of bed to chair 2 times a day   - Out of bed for meals 2 times a day  - Out of bed for toileting  - Record patient progress and toleration of activity level   2/20/2022 1845 by Piero Duvall RN  Outcome: Progressing  2/20/2022 1844 by Piero Duvall RN  Outcome: Progressing  Goal: Patient will remain free of falls  Description: INTERVENTIONS:  -  Assess patient's ability to carry out ADLs; assess patient's baseline for ADL function and identify physical deficits which impact ability to perform ADLs (bathing, care of mouth/teeth, toileting, grooming, dressing, etc )  - Assess/evaluate cause of self-care deficits   - Assess range of motion  - Assess patient's mobility; develop plan if impaired  - Assess patient's need for assistive devices and provide as appropriate  - Encourage maximum independence but intervene and supervise when necessary  - Involve family in performance of ADLs  - Assess for home care needs following discharge   - Consider OT consult to assist with ADL evaluation and planning for discharge  - Provide patient education as appropriate  2/20/2022 1845 by Piero Duvall RN  Outcome: Progressing  2/20/2022 1844 by Piero Duvall RN  Outcome: Progressing     Problem: DISCHARGE PLANNING  Goal: Discharge to home or other facility with appropriate resources  Description: INTERVENTIONS:  - Identify barriers to discharge w/patient and caregiver  - Arrange for needed discharge resources and transportation as appropriate  - Identify discharge learning needs (meds, wound care, etc )  - Arrange for interpretive services to assist at discharge as needed  - Refer to Case Management Department for coordinating discharge planning if the patient needs post-hospital services based on physician/advanced practitioner order or complex needs related to functional status, cognitive ability, or social support system  2/20/2022 1845 by Piero Duvall RN  Outcome: Progressing  2/20/2022 1844 by Piero Duvall RN  Outcome: Progressing

## 2022-02-20 NOTE — H&P
[place]  H&P- Giuliana Ibarra 1934, 80 y o  female MRN: [id number]  Unit/Bed#: ED 01 Encounter: [id number]  Primary Care Provider: Mayco Nickerson DO   Date and time admitted to hospital: 2/20/2022 11:41 AM    * COVID-19  Assessment & Plan  · Positive 2/20 in ED  · Admit under moderate pathway, requiring 5-6L at time of admission  · Continue Rocephin given elevated procalcitonin  · Continue Dexamethasone and Remdesivir    Acute on chronic respiratory failure with hypoxia ([facility] )  Assessment & Plan  · At baseline uses 2L NC  · Requiring 5-6L NC at time of admission due to pneumonia  · Continue to treat pneumonia    Sepsis ([place] [address] )  Assessment & Plan  · Due to COVID pneumonia  · Leukocytosis and tachypnea on admission    Severe protein-calorie malnutrition ([facility] )  Assessment & Plan  Malnutrition Findings:           BMI Findings: There is no height or weight on file to calculate BMI  · Dietary supplemenets     Lymphoma ([place] [address] )  Assessment & Plan  · WBC 29K on admission   · Baseline around 20-25 for patient  · Continue outpatient follow up  · Palliative Care consult requested by family    COPD (chronic obstructive pulmonary disease) ([place] [address] )  Assessment & Plan  · At baseline uses 2L NC  · Requiring 5-6L at time of admission  · Continue dexamethasone as part of COVID protocol    Mixed hyperlipidemia  Assessment & Plan  · Continue Lipitor    Pneumonia  Assessment & Plan  · CXR revealing multifocal opacities  · COVID positive  · Procalcitonin 0 49  · Treat for suspected superimposed bacterial infection    VTE Pharmacologic Prophylaxis: VTE Score: 8 High Risk (Score >/= 5) - Pharmacological DVT Prophylaxis Ordered: enoxaparin (Lovenox)  Sequential Compression Devices Ordered  Code Status: Level 3 - DNAR and DNI   Discussion with family: Updated  (daughter) at bedside      Anticipated Length of Stay: Patient will be admitted on an inpatient basis with an anticipated length of stay of greater than 2 midnights secondary to COVID pneumonia, sepsis  Total Time for Visit, including Counseling / Coordination of Care: 70 minutes Greater than 50% of this total time spent on direct patient counseling and coordination of care  Chief Complaint: SOB    History of Present Illness:  Berna Loza is a 80 y o  female with a PMH of lymphoma, COPD who presents with SOB x1 week with associated deafness  Patient with SOB x1 week  Emergency department found have COVID-19  Also suspicious for superimposed bacterial infection given findings on CT chest   Daughter is interested in discussing palliative care measures given that she is not pursuing treatment for lymphoma  Will continue to treat COVID under moderate pathway and have ongoing goals of care discussion  Review of Systems:  Review of Systems    Past Medical and Surgical History:   Past Medical History:   Diagnosis Date    Anemia     Blind right eye     [facility] obstructive [facility]     [facility]     Disease of thyroid gland     Hypothyroidism     TIA (transient ischemic attack)        Past Surgical History:   Procedure Laterality Date    HIP FRACTURE SURGERY Bilateral        Meds/Allergies:  Prior to Admission medications    Medication Sig Start Date End Date Taking?  Authorizing Provider   Albuterol Sulfate 108 (90 Base) MCG/ACT AEPB Inhale 2 puffs   Yes Historical Provider, MD   atorvastatin (LIPITOR) 20 mg tablet Take 20 mg by mouth 7/5/21   Historical Provider, MD   Calcium Carbonate-Vitamin D 600-200 MG-UNIT TABS Take 500 mg by mouth     Historical Provider, MD   co-enzyme Q-10 30 MG capsule Take 30 mg by mouth 3 (three) times a day    Historical Provider, MD   ferrous sulfate 325 (65 Fe) mg tablet Take 325 mg by mouth daily with breakfast    Historical Provider, MD   levothyroxine 25 mcg tablet 37 5 mcg  10/6/21   Historical Provider, MD   mirtazapine (Fabienne Israel) 7 5 MG tablet Take 15 mg by mouth      Historical Provider, MD   Multiple Vitamin (multivitamin) capsule Take 1 capsule by mouth daily    Historical Provider, MD   senna-docusate sodium (SENOKOT S) 8 6-50 mg per tablet Take 1 tablet by mouth daily    Historical Provider, MD   sertraline (ZOLOFT) 25 mg tablet Take 25 mg by mouth daily    Historical Provider, MD   escitalopram (LEXAPRO) 5 mg tablet  10/6/21 2/20/22  Historical Provider, MD FRIAS have reviewed home medications with patient family member  Allergies: Allergies   Allergen Reactions    Amoxicillin Hives       Social History:  Marital Status:    Occupation: retired  Patient Pre-hospital Living Situation: Home  Patient Pre-hospital Level of Mobility:    Patient Pre-hospital Diet Restrictions: none  Substance Use History:   Social History     Substance and Sexual Activity   Alcohol Use Not Currently     Social History     Tobacco Use   Smoking Status Never Smoker   Smokeless Tobacco Never Used     Social History     Substance and Sexual Activity   Drug Use Not Currently       Family History:  No family history on file  Physical Exam:     Vitals:   Blood Pressure: 125/58 (02/20/22 1400)  Pulse: 86 (02/20/22 1400)  Temperature: 97 6 °F (36 4 °C) (02/20/22 1154)  Respirations: (!) 30 (02/20/22 1400)  SpO2: (!) 89 % (02/20/22 1400)    Physical Exam  Vitals and nursing note reviewed  Constitutional:       General: She is not in acute distress  Appearance: Normal appearance  She is well-developed and underweight  Interventions: Nasal cannula in place  HENT:      Head: Normocephalic and atraumatic  Eyes:      General: No scleral icterus  Conjunctiva/sclera: Conjunctivae normal    Cardiovascular:      Rate and Rhythm: Normal rate and regular rhythm  Heart sounds: No murmur heard  Pulmonary:      Effort: Pulmonary effort is normal       Breath sounds: No wheezing, rhonchi or rales     Abdominal:      General: There is no distension  Palpations: Abdomen is soft  Skin:     General: Skin is warm and dry  Neurological:      General: No focal deficit present  Mental Status: She is alert  Psychiatric:         Mood and Affect: Mood normal          Cognition and Memory: Cognition is impaired  Memory is impaired  Additional Data:     Lab Results:  Results from last 7 days   Lab Units 02/20/22  1155 02/20/22  1154   WBC Thousand/uL  --  29 17*   HEMOGLOBIN g/dL  --  9 5*   I STAT HEMOGLOBIN g/dl 10 2*  --    HEMATOCRIT %  --  31 4*   HEMATOCRIT, ISTAT % 30*  --    PLATELETS Thousands/uL  --  630*   BANDS PCT %  --  3   LYMPHO PCT %  --  57*   MONO PCT %  --  1*   EOS PCT %  --  0     Results from last 7 days   Lab Units 02/20/22  1155 02/20/22  1154   SODIUM mmol/L  --  140   POTASSIUM mmol/L  --  4 0   CHLORIDE mmol/L  --  98*   CO2 mmol/L  --  30   CO2, I-STAT mmol/L 34*  --    BUN mg/dL  --  20   CREATININE mg/dL  --  0 78   ANION GAP mmol/L  --  12   CALCIUM mg/dL  --  8 6   ALBUMIN g/dL  --  2 6*   TOTAL BILIRUBIN mg/dL  --  0 40   ALK PHOS U/L  --  144*   ALT U/L  --  71   AST U/L  --  69*   GLUCOSE RANDOM mg/dL  --  198*     Results from last 7 days   Lab Units 02/20/22  1154   INR  1 01             Results from last 7 days   Lab Units 02/20/22  1154   LACTIC ACID mmol/L 1 9   PROCALCITONIN ng/ml 0 49*       Imaging: Reviewed radiology reports from this admission including: chest xray and chest CT scan  XR chest 1 view portable   ED Interpretation by Martha Moreno DO (02/20 1247)   Bilateral infiltrates      Final Result by Sebastien Smallwood MD (02/20 6142)      Right greater than left multifocal airspace opacities suspicious for multifocal pneumonia versus asymmetric pulmonary edema  Workstation performed: [id number]             EKG and Other Studies Reviewed on Admission:   · EKG: No EKG obtained  ** Please Note: This note has been constructed using a voice recognition system   **

## 2022-02-21 PROBLEM — J12.82 PNEUMONIA DUE TO COVID-19 VIRUS: Status: ACTIVE | Noted: 2022-01-01

## 2022-02-21 PROBLEM — L89.152 PRESSURE INJURY OF SACRAL REGION, STAGE 2 (HCC): Status: ACTIVE | Noted: 2022-01-01

## 2022-02-21 NOTE — SPEECH THERAPY NOTE
Speech Language/Pathology    Speech-Language Pathology Bedside Swallow Evaluation      Patient Name: Naif Bonilla    YWJUE'U Date: 2/21/2022     Problem List  Principal Problem:    Pneumonia due to COVID-19 virus  Active Problems:    Mixed hyperlipidemia    COPD (chronic obstructive pulmonary disease) ([facility] )    Lymphoma ([facility])    Severe protein-calorie malnutrition ([facility] )    Sepsis ([facility] )    Acute on chronic respiratory failure with hypoxia ([facility] )    Pressure injury of sacral region, stage 2 ([facility] )      Past Medical History  Past Medical History:   Diagnosis Date    Anemia     Blind right eye     COPD [facility]chronic obstructive pulmonary disease) [facility]     COPD (chronic obstructive pulmonary disease) [facility]     Disease of thyroid gland     Hypothyroidism     TIA (transient ischemic attack)        Past Surgical History  Past Surgical History:   Procedure Laterality Date    HIP FRACTURE SURGERY Bilateral        Summary   Pt presented with functional appearing oral and pharyngeal stage swallowing skills with materials administered today  Pt w/ poor appetite and limited bolus acceptance during evaluation  No significant oral difficulties w/ small amount of trials today  Swallows appear fairly prompt  No overt s/s aspiration across trials  Pt denies dysphagia and/or s/s aspiration  Risk/s for Aspiration: Low      Recommended Diet: regular diet and thin liquids   Recommended Form of Meds: As tolerated   Aspiration precautions and swallowing strategies: upright posture, only feed when fully alert and small bites/sips  Other Recommendations: Continue frequent oral care        Current Medical Status  Pt is a 80 y o  female who presented to [facility] with a PMH of lymphoma, COPD who presents with SOB x1 week with associated deafness  Patient with SOB x1 week  Emergency department found have COVID-19    Also suspicious for superimposed bacterial infection given findings on CT chest   Daughter is interested in discussing palliative care measures given that she is not pursuing treatment for lymphoma  Will continue to treat COVID under moderate pathway and have ongoing goals of care discussion  Current Precautions:  Contact    Airborne     Allergies:  No known food allergies    Past medical history:  Please see H&P for details    Special Studies:  CXR 2/20/22: Right greater than left multifocal airspace opacities suspicious for multifocal pneumonia versus asymmetric pulmonary edema  Social/Education/Vocational Hx:  Pt lives with family    Swallow Information   Current Risks for Dysphagia & Aspiration: respiratory status  Current Symptoms/Concerns: current pna, poor intake   Current Diet: regular diet and thin liquids   Baseline Diet: regular diet and thin liquids      Baseline Assessment   Behavior/Cognition: alert  Speech/Language Status: able to participate in basic conversation and able to follow commands  Patient Positioning: upright in bed  Pain Status/Interventions/Response to Interventions:   No report of or nonverbal indications of pain  Swallow Mechanism Exam  Facial: symmetrical  Labial: WFL  Lingual: WFL  Velum: symmetrical  Mandible: adequate ROM  Dentition: full dentures  Vocal quality:clear/adequate   Volitional Cough: strong/productive   Respiratory Status:   on 4L O2        Consistencies Assessed and Performance   Consistencies Administered: regular texture lunch tray, thin liquids  Materials administered included mashed potatoes, meatloaf, hard cookie     Oral Stage: WFL  Mastication was adequate with the materials administered today  Bolus formation and transfer were functional with no significant oral residue noted  No overt s/s reduced oral control  Pharyngeal Stage: WFL  Swallow Mechanics:  Swallowing initiation appeared prompt  Laryngeal rise was palpated and judged to be within functional limits    No coughing, throat clearing, change in vocal quality or respiratory status noted today  Esophageal Concerns: none reported    Strategies and Efficacy: -     Summary and Recommendations (see above)    Results Reviewed with: patient, RN and MD     Treatment Recommended: Yes     Frequency of treatment: Brief for diet tolerance over larger sample as able     Patient Stated Goal: "I'm not very hungry"     Dysphagia LTG  -Patient will demonstrate safe and effective oral intake (without overt s/s significant oral/pharyngeal dysphagia including s/s penetration or aspiration) for the highest appropriate diet level                 Speech Therapy Prognosis   Prognosis: good    Prognosis Considerations: age, medical status, prior medical history and cognitive status

## 2022-02-21 NOTE — PROGRESS NOTES
[place]  Progress Note - Cayetano Duffolla 1934, 80 y o  female MRN: [id number]  Unit/Bed#: -01 Encounter: [id number]  Primary Care Provider: Tracy Talbot DO   Date and time admitted to hospital: 2/20/2022 11:41 AM    Pressure injury of sacral region, stage 2 [facility])  Assessment & Plan  Patient has stage II sacral pressure ulcer present on admission without signs of cellulitis  Continue local care, frequent repositioning

## 2022-02-21 NOTE — UTILIZATION REVIEW
Initial Clinical Review    Admission: Date/Time/Statement:   Admission Orders (From admission, onward)     Ordered        02/20/22 1324  Inpatient Admission  Once                      Orders Placed This Encounter   Procedures    Inpatient Admission     Standing Status:   Standing     Number of Occurrences:   1     Order Specific Question:   Level of Care     Answer:   Med Surg [16]     Order Specific Question:   Estimated length of stay     Answer:   More than 2 Midnights     Order Specific Question:   Certification     Answer:   I certify that inpatient services are medically necessary for this patient for a duration of greater than two midnights  See H&P and MD Progress Notes for additional information about the patient's course of treatment  ED Arrival Information     Expected Arrival Acuity    - 2/20/2022 11:38 Emergent         Means of arrival Escorted by Service Admission type    Wheelchair Family Member Hospitalist Emergency         Arrival complaint    Low Oxygen Level, Not eating, Confusion        Chief Complaint   Patient presents with    Altered Mental Status     Patient presents to ED with decreased appetite, weakness, altered mental status, more confused for the past week and worsening  Initial Presentation:  this is a 80year old female from home to ED admitted due to [facility]/acute on chronic respiratory failure with hypoxia/Sepsis/pneumonia  PMH of lymphoma, COPD  On home oxygen 2 liters  Presented due to confusion, weakness starting week prior to arrival, sat today 83%, was 85% yesterday  + cough and shortness of breath  On exam:  Cachectic  Mucous membranes dry  Tachypnea  Wheezing  Disoriented  D dimer 2 96  SARS-COV-2 positive  Wbc 29 17  Procalcitonin 0 49  CxR showed bilateral infiltrates  Oxygen increased to 6 liters  Plan is moderate pathway:  Decadron, Remdesivir, Rocephin, Heparin gtt  Oxygen for sat > 90%  Consult Palliative care        Date: 2/21/22 Day 2:  Patient has presbycusis  Poor intake  Remains on oxygen  On exam cachectic  Diffuse atrophy of muscles of extremities, temporal wasting  Stage 2 sacral pressure ulcer  Small amount of wax right ear  Lungs rales  Very hard of hearing  Continue oxygen, IV decadron, remdesivir, rocephin, doxycycline and heparin gtt  Per Palliative care:  Goals of care counseling  Patient is level one code but family considering level 3 (no intubation/resusitation)  Their goal is for patient ultimately to go home  For family meeting on 2/23       ED Triage Vitals   Temperature Pulse Respirations Blood Pressure SpO2   02/20/22 1154 02/20/22 1148 02/20/22 1150 02/20/22 1151 02/20/22 1148   97 6 °F (36 4 °C) 99 (!) 26 141/60 (!) 73 %      Temp Source Heart Rate Source Patient Position - Orthostatic VS BP Location FiO2 (%)   02/20/22 1824 02/20/22 1150 -- -- --   Oral Monitor         Pain Score       02/20/22 1824       No Pain          Wt Readings from Last 1 Encounters:   02/20/22 44 kg (97 lb)     Additional Vital Signs:   02/21/22 08:58:17 -- 66 22 108/62 77 90 % -- -- -- -- --   02/21/22 0500 -- 73 18 -- -- 97 % 36 -- 4 L/min  Nasal cannula --   02/21/22 0234 94 5 °F (34 7 °C) Abnormal  78 18 110/57 -- 97 % 52 -- 8 L/min Mid flow nasal cannula --   02/20/22 1824 98 5 °F (36 9 °C) 86 20 131/61 -- 92 % 60 -- 10 L/min Mid flow nasal cannula --   02/20/22 1812 -- -- -- -- -- 92 % 60 -- 10 L/min Mid flow nasal cannula --   02/20/22 15:33:21 98 5 °F (36 9 °C) 86 -- 131/61 84 89 % Abnormal  -- -- -- -- --   02/20/22 1400 -- 86 30 Abnormal  125/58 83 89 % Abnormal  -- -- -- -- --   02/20/22 1300 -- 86 25 Abnormal  119/58 84 92 % 40 -- 5 L/min Nasal cannula --   02/20/22 1200 -- 97 26 Abnormal  124/60 86 90 % 40 -- 5 L/min Nasal cannula --   02/20/22 1154 97 6 °F (36 4 °C) -- -- -- -- -- -- -- -- -- --   02/20/22 1153 -- -- -- -- -- 91 % 44 -- 6 L/min Nasal cannula --   02/20/22 1151 -- -- -- 141/60 -- 84 % Abnormal  44 -- 6 L/min Nasal cannula        Pertinent Labs/Diagnostic Test Results:   2/20/22 CxR - Right greater than left multifocal airspace opacities suspicious for multifocal pneumonia versus asymmetric pulmonary edema      2/20/22 ecg Normal sinus rhythm  Rightward axis  Borderline ECG  When compared with ECG of 08-NOV-2021 19:08,  No significant change was found    2/20/22 ecg Normal sinus rhythm  Rightward axis  Borderline ECG  When compared with ECG of 20-FEB-2022 11:59, (unconfirmed)  No significant change was found  Results from last 7 days   Lab Units 02/20/22  1322   SARS-COV-2  Positive*     Results from last 7 days   Lab Units 02/21/22  0251 02/20/22  1155 02/20/22  1154   WBC Thousand/uL 16 17*  --  29 17*   HEMOGLOBIN g/dL 7 7*  --  9 5*   I STAT HEMOGLOBIN g/dl  --  10 2*  --    HEMATOCRIT % 25 7*  --  31 4*   HEMATOCRIT, ISTAT %  --  30*  --    PLATELETS Thousands/uL 571*  --  630*   BANDS PCT % 2  --  3     Results from last 7 days   Lab Units 02/21/22  0251 02/20/22  1155 02/20/22  1154   SODIUM mmol/L 144  --  140   POTASSIUM mmol/L 3 6  --  4 0   CHLORIDE mmol/L 106  --  98*   CO2 mmol/L 29  --  30   CO2, I-STAT mmol/L  --  34*  --    ANION GAP mmol/L 9  --  12   BUN mg/dL 21  --  20   CREATININE mg/dL 0 42*  --  0 78   EGFR ml/min/1 73sq m 92  --  68   CALCIUM mg/dL 7 8*  --  8 6   CALCIUM, IONIZED, ISTAT mmol/L  --  1 13  --      Results from last 7 days   Lab Units 02/21/22  0251 02/20/22  1154   AST U/L 53* 69*   ALT U/L 56 71   ALK PHOS U/L 113 144*   TOTAL PROTEIN g/dL 5 3* 6 6   ALBUMIN g/dL 2 1* 2 6*   TOTAL BILIRUBIN mg/dL 0 20 0 40     Results from last 7 days   Lab Units 02/21/22  0250 02/20/22  2123   POC GLUCOSE mg/dl 146* 159*     Results from last 7 days   Lab Units 02/21/22  0251 02/20/22  1154   GLUCOSE RANDOM mg/dL 129 198*     Results from last 7 days   Lab Units 02/20/22  1155   PH, CHICA I-STAT  7 420*   PCO2, CHICA ISTAT mm HG 49 9   PO2, CHICA ISTAT mm HG 19 0*   HCO3, CHICA ISTAT mmol/L 32 3*   I STAT BASE EXC mmol/L 7*   I STAT O2 SAT % 29*     Results from last 7 days   Lab Units 02/20/22  1626 02/20/22  1457 02/20/22  1154   HS TNI 0HR ng/L  --   --  15   HS TNI 2HR ng/L  --  12  --    HSTNI D2 ng/L  --  -3  --    HS TNI 4HR ng/L 12  --   --    HSTNI D4 ng/L -3  --   --      Results from last 7 days   Lab Units 02/20/22  1154   D-DIMER QUANTITATIVE ug/ml FEU 2 96*     Results from last 7 days   Lab Units 02/20/22  1154   PROTIME seconds 13 2   INR  1 01   PTT seconds 34     Results from last 7 days   Lab Units 02/21/22  0251 02/20/22  1154   PROCALCITONIN ng/ml 0 46* 0 49*     Results from last 7 days   Lab Units 02/20/22  1154   LACTIC ACID mmol/L 1 9     Results from last 7 days   Lab Units 02/20/22  1313   CLARITY UA  Clear   COLOR UA  Yellow   SPEC GRAV UA  1 020   PH UA  6 0   GLUCOSE UA mg/dl Negative   KETONES UA mg/dl Negative   BLOOD UA  Trace-lysed*   PROTEIN UA mg/dl 30 (1+)*   NITRITE UA  Negative   BILIRUBIN UA  Negative   UROBILINOGEN UA E U /dl 0 2   LEUKOCYTES UA  Small*   WBC UA /hpf 1-2   RBC UA /hpf None Seen   BACTERIA UA /hpf Occasional   EPITHELIAL CELLS WET PREP /hpf Occasional     Results from last 7 days   Lab Units 02/20/22  1322   INFLUENZA A PCR  Negative   INFLUENZA B PCR  Negative   RSV PCR  Negative     Results from last 7 days   Lab Units 02/20/22  1158 02/20/22  1154   BLOOD CULTURE  Received in Microbiology Lab  Culture in Progress  Received in Microbiology Lab  Culture in Progress       ED Treatment:   Medication Administration from 02/20/2022 1137 to 02/20/2022 1520       Date/Time Order Dose Route Action Comments     02/20/2022 1325 sodium chloride 0 9 % infusion 125 mL/hr Intravenous New Bag      02/20/2022 1323 cefepime (MAXIPIME) IVPB (premix in dextrose) 2,000 mg 50 mL 2,000 mg Intravenous New Bag      02/20/2022 1458 dexamethasone (DECADRON) injection 6 mg 6 mg Intravenous Given      02/20/2022 1458 enoxaparin (LOVENOX) subcutaneous injection 30 mg 30 mg Subcutaneous Given      02/20/2022 1439 remdesivir (Veklury) 200 mg in sodium chloride 0 9 % 290 mL IVPB 200 mg Intravenous Given         Past Medical History:   Diagnosis Date    Anemia     Blind right eye     COPD (chronic obstructive pulmonary disease) (HCC)     COPD (chronic obstructive pulmonary disease) (HCC)     Disease of thyroid gland     Hypothyroidism     TIA (transient ischemic attack)      Present on Admission:   Mixed hyperlipidemia   COPD (chronic obstructive pulmonary disease) (HCC)   Lymphoma (HCC)      Admitting Diagnosis: Acute respiratory distress [R06 03]  Altered mental status [R41 82]  Pneumonia [J18 9]  Acute on chronic respiratory failure with hypoxia (HCC) [J96 21]  Lymphoma, unspecified body region, unspecified lymphoma type ([facility] [address] ) [C85 90]  Age/Sex: 80 y o  female  Admission Orders:  2/20/22 1324 inpatient   Scheduled Medications:  atorvastatin, 20 mg, Oral, Daily With Dinner  cefTRIAXone, 1,000 mg, Intravenous, Q24H  dexamethasone, 6 mg, Intravenous, Q24H  ferrous sulfate, 325 mg, Oral, Daily With Breakfast  levothyroxine, 25 mcg, Oral, Early Morning  mirtazapine, 15 mg, Oral, HS  remdesivir, 100 mg, Intravenous, Q24H  senna-docusate sodium, 1 tablet, Oral, Daily  sertraline, 25 mg, Oral, Daily      Continuous IV Infusions:  heparin (porcine), 3-20 Units/kg/hr (Order-Specific), Intravenous, Titrated  sodium chloride, 100 mL/hr, Intravenous, Continuous      PRN Meds:  acetaminophen, 650 mg, Oral, Q6H PRN - used x 1 2/21  heparin (porcine), 1,200 Units, Intravenous, Q1H PRN  heparin (porcine), 2,400 Units, Intravenous, Q1H PRN    Aspiration precautions  Oxygen for sat > 90%  Self proning  Incentive spirometry    IP CONSULT TO PALLIATIVE CARE    Network Utilization Review Department  ATTENTION: Please call with any questions or concerns to [phone] and carefully listen to the prompts so that you are directed to the right person   All voicemails are confidential   Soco reyes requests for admission clinical reviews, approved or denied determinations and any other requests to dedicated fax number below belonging to the campus where the patient is receiving treatment   List of dedicated fax numbers for the Facilities:  [address] [place] [address] DENIALS (Administrative/Medical Necessity) [phone]   [address]  [facility] (Maternity/NICU/Pediatrics) [phone] [address]  [phone] [address] Meme [address] [facility] [phone]

## 2022-02-21 NOTE — CASE MANAGEMENT
Case Management Assessment & Discharge Planning Note    Patient name Marixa Ward  Location /-66 MRN [id number]  : 1934 Date 2022       Current Admission Date: 2022  Current Admission Diagnosis:Pneumonia due to COVID-19 virus   Patient Active Problem List    Diagnosis Date Noted    Pressure injury of sacral region, stage 2 ([facility] [address] ) 2022    Sepsis ([facility] [address] ) 2022    Acute on chronic respiratory failure with hypoxia ([facility] [address] ) 2022    Pneumonia due to COVID-19 virus 2022    Elevated brain natriuretic peptide (BNP) level 2021    Anemia 10/08/2021    Severe protein-calorie malnutrition ([facility] [address] ) 10/08/2021    Depression 10/07/2021    Hypothyroidism 10/07/2021    Mixed hyperlipidemia 10/07/2021    COPD (chronic obstructive pulmonary disease) ([facility] [address] ) 10/07/2021    Chronic respiratory failure with hypoxia ([facility] [address] ) 10/07/2021    Hyponatremia 10/07/2021    Malnutrition ([facility] [address] ) 10/07/2021    Lymphoma ([facility] [address] ) 10/07/2021      LOS (days): 1  Geometric Mean LOS (GMLOS) (days):   Days to GMLOS:     OBJECTIVE:    Risk of Unplanned Readmission Score: 25         Current admission status: Inpatient       Preferred Pharmacy:   [address], [address] [facility] [address] [facility]  [address] [facility]  [address] [facility] [address]  Phone: [phone] Fax: [phone]    Primary Care Provider: Srinath Ochoa DO    Primary Insurance: [facility]  Secondary Insurance:     ASSESSMENT:  [address]   Primary Phone: [phone] (Mobile)               Advance Directives  Does patient have a [address]?:  (copy requested)  Does patient have Advance Directives?: Yes  Advance Directives: Living will,Power of  for health care         Readmission Root Cause  30 Day Readmission: No    Patient Information  Admitted from[de-identified] Home  Mental Status: Confused  During Assessment patient was accompanied by: Not accompanied during assessment  Assessment information provided by[de-identified] Other - please comment (granddaughter)  Primary Caregiver: Private caregiver  Caregiver's Relationship to Patient[de-identified] Other (Specify) (private agency)  Support Systems: Family members  Home entry access options  Select all that apply : Stairs  Number of steps to enter home : 3 (bump up in [facility])  Type of Current Residence: 2 story home  Upon entering residence, is there a bedroom on the main floor (no further steps)?: Yes  Upon entering residence, is there a bathroom on the main floor (no further steps)?: Yes  In the last 12 months, was there a time when you were not able to pay the mortgage or rent on time?: No  In the last 12 months, how many places have you lived?: 1  In the last 12 months, was there a time when you did not have a steady place to sleep or slept in a shelter (including now)?: No  Homeless/housing insecurity resource given?: N/A  Living Arrangements: Other (Comment) (granddaughter and her family)  Is patient a ?: No    Activities of Daily Living Prior to Admission  Functional Status: Independent (supervision)  Completes ADLs independently?: No  Level of ADL dependence: Assistance  Ambulates independently?: Yes (supervision)  Does patient use assisted devices?: Yes  Assisted Devices (DME) used: Bedside Commode,Home Oxygen concentrator,Walker,Hospital Bed,Wheelchair  DME Company Name (respiratory supplies): [employer]  O2 Rate(s): 2L at night    Does patient currently own DME?: Yes  What DME does the patient currently own?: Bedside Commode,Hospital Bed,Walker,Home Oxygen concentrator,Wheelchair  Does patient have a history of Outpatient Therapy (PT/OT)?: No  Does patient have a history of HHC?: Yes (hx of [facility])  Does patient currently have [facility]?: No    Patient Information Continued  Does patient have prescription coverage?: Yes  Within the past 12 months, you worried that your food would run out before you got the money to buy more : Never true  Within the past 12 months, the food you bought just didnt last and you didnt have money to get more : Never true  Food insecurity resource given?: N/A  Does patient have a history of substance abuse?: No  Does patient have a history of Mental Health Diagnosis?: No    Means of Transportation  Means of Transport to Appts[de-identified] Family transport  In the past 12 months, has lack of transportation kept you from medical appointments or from getting medications?: No  In the past 12 months, has lack of transportation kept you from meetings, work, or from getting things needed for daily living?: No  Was application for public transport provided?: N/A      DISCHARGE DETAILS:    Discharge planning discussed with[de-identified] granddaughter Mai Sue of Choice: Yes  Comments - [place] of Choice: [place] is requesting [address] services and prefers [facility]    CM contacted family/caregiver?: Yes  Were Treatment Team discharge recommendations reviewed with patient/caregiver?: Yes  Did patient/caregiver verbalize understanding of patient care needs?: Yes    Contacts  Patient Contacts: Cadence Sun, granddaughter  Relationship to Patient[de-identified] Family  Contact Method: Phone  Phone Number: [phone]  Reason/Outcome: Discharge [address]         Is the patient interested in [address] at discharge?: Yes  [address] Le[address] Lowery requested[de-identified] [address] Name[de-identified] [facility] Provider[de-identified] PCP  Home Health Services Needed[de-identified] Other (comment) (medication management)  Homebound Criteria Met[de-identified] Uses an Assist Device (i e  cane, walker, etc)  Supporting Clincal Findings[de-identified] Limited Endurance    DME Referral Provided  Referral made for DME?: No    Other Referral/Resources/Interventions Provided:  Interventions: C  Referral Comments: Referral to [facility]    Treatment Team Recommendation: [facility]  Discharge Destination Plan[de-identified] Home with Joy at Discharge : Family     Additional Comments: Pt confused  CM called and spoke to pt's granddaughter Carlos Mason to discuss the role of CM and to discuss any help pt may need prior to dc  Pt lives with Carlos Mason in a 2 story home with a 1st floor setup and 3 [address]  Pt has a private caregiver monday-Friday 8am-4pm and over the weekend 9am-3pm while Carlos Mason is at work  Pt is able to ambulate with a RW and supervision for ADL's  Pt has a hospital bed, RW, WC, bedside commode, and home 02 through [facility] that she uses at night  family provides transport  Pt's daughters are POA; document requested  Pt has a hx with [facility] and Carlos Mason is requesting services at dc  Referral sent  Pt has a hx of rehab; not interested in that again  No hx of mental health or D&A treatment

## 2022-02-21 NOTE — NURSING NOTE
Pts temp 94 5 rectally  Pt also profusely sweating, blood sugar WDL  Notified SLIM team  Questioned need for [employer]  Per PA, treat this as a fever and administer tylenol, apply warmed blankets  Will recheck an hour after administration

## 2022-02-21 NOTE — CONSULTS
Consultation - Palliative and Supportive Care   Justo Leblanc 80 y o  female [id number]    Assessment/Problems Actively Addressed:  Goals of care counseling  Severe protein calorie malnutrition  Lymphoma  Acute on chronic respiratory failure with hypoxia  COVID-19 pneumonia  COPD  Stage II pressure injury     Goals:  · Currently level 1 code status - family strongly considering level 3 DNR  · Family's long-term goal: "For [facility] to come home "  · Lara arranging family meeting, likely Wednesday 2/23  Will call [facility] office with time  · Family meeting to include Lara (daughter), Kely Paulino (niece), and Kavita Dines (daughter)  · Plan to address home hospice vs ongoing medical cares  · Plan to address code status  · [facility] will follow  Plan:  Symptom management:  Will defer symptom management to the primary team at this time  Social support:   Time spent providing supportive listening   Validated family experience              I have reviewed the patient's controlled substance dispensing history in the Prescription Drug Monitoring Program in compliance with the [facility] regulations before prescribing any controlled substances  Last refills  N/A  Decisional apparatus:  Patient is not competent on exam today  If competence is lost, patient's substitute decision maker would default to 3 living children* by [place] Act 169  *Parvin's son Dale Curtis has deferred decision-making to his 2 sisters Gonzalo Schroeder and Kavita Galaviz  Advance Directive/Living Will: Yes - but not on file  POLST: no   POA Forms: no    We appreciate the invitation to be involved in this patient's care  We will continue to follow throughout this hospitalization  Please do not hesitate to reach our on call provider through our clinic answering service at  should you have acute symptom control concerns  MAGDALENA Moreau  Palliative and Supportive Care  Clinic/Answering Service: [phone]  You can find me on [employer]! IDENTIFICATION:  Inpatient consult to Palliative Care  Consult performed by: Silas Bowling PA-C  Consult ordered by: Michael Hopson PA-C        Physician Requesting Consult: King Arthur MD  Reason for Consult / Principal Problem: GOC counseling and SM secondary to severe protein calorie malnutrition with acute respiratory failure with hypoxia        History of Present Illness:  Shea Mccoy is a 80 y o  female who presents with a palliative diagnosis of lymphoma and severe protein calorie malnutrition was brought into the ED at [address] on 2/20/22 secondary to shortness of breath  In the emergency department, Kathi Jennings was found to have COVID-19 pneumonia as well as likely superimposed bacterial pneumonia and possible COPD exacerbation  Kathi Jennings is daughter is interested in discussion with palliative Care as she is not actively pursuing treatment for lymphoma  I discussed over the phone with Parvin's daughter Patrick Vargas  I introduced the role of palliative and supportive care  Patrick Vargas states that Kathi santos has 3 living children (1 son passed away, [facility] father)  Patrick Vargas states that her niece Hilda Linton is closely connected with Kathi Jennings and should also be involved in decision making process  Patrick Vargas states her brother Erika Chua is up-to-date on Kathi Jennings is healthcare however has always deferred to Patrick Vargas and Kassidy Snyder to make decisions and does not want to be involved in decision making process  Patrick Vargas request a phone call with Kassidy Snyder and Hilda Linton present  We discussed availabilities and decided to arrange phone call for Wednesday  Current sleepy, Kathi santos lives with her niece Hilda Linton  She also has a privately finance caretaker from 8-4 every day named Lukas Vargas makes it clear that Parvin's main goal would be to return home at discharge  Keke Miles asks several questions so she can be prepared for the conversation on Wednesday    We discussed Parvin's chronic medical conditions in addition to her acute medical conditions  We discussed prognosis of chronic conditions even if she has complete recovery from acute illnesses  We discussed the risk versus benefit of going to rehab facility  Kang Barahona does not believe Briana Turpin would benefit from this due to her frailty and her desire to be a home  We discussed the nature and purpose of palliative versus hospice cares  We discussed the location of hospice cares  We discussed Parvin's code status  Kang Barahona believes her mother is a DNR/DNI however would like to wait until conversation with her sister before making this change  Kang Barahona reports that Briana Turpin does have a Living Will  We reviewed that it is not on record here  Kang Barahona believes it with is with her sister Britney Nixon and she will relay the message  All questions answered at this time  Will await family's determination of good time for conference call, likely on Wednesday  Review of Systems:   Review of Systems   Unable to perform ROS: dementia       Past Medical History:   Diagnosis Date    Anemia     Blind right eye     [facility] [address]     COPD [facility]ronic obstructive pulmonary disease) [facility] [address] [facility]     Disease of thyroid gland     Hypothyroidism     TIA (transient ischemic attack)      Past Surgical History:   Procedure Laterality Date    HIP FRACTURE SURGERY Bilateral      Social History     Socioeconomic History    Marital status:       Spouse name: Not on file    Number of children: Not on file    Years of education: Not on file    Highest education level: Not on file   Occupational History    Not on file   Tobacco Use    Smoking status: Never Smoker    Smokeless tobacco: Never Used   Vaping Use    Vaping Use: Never used   Substance and Sexual Activity    Alcohol use: Not Currently    Drug use: Not Currently    Sexual activity: Not Currently   Other Topics Concern    Not on file   Social History Narrative    Not on file Social Determinants of Health     Financial Resource Strain: Not on file   Food Insecurity: Not on file   Transportation Needs: Not on file   Physical Activity: Not on file   Stress: Not on file   Social Connections: Not on file   Intimate Partner Violence: Not on file   Housing Stability: Not on file     History reviewed  No pertinent family history      Medications:  all current active meds have been reviewed    Allergies   Allergen Reactions    Amoxicillin Hives         Medications    Current Facility-Administered Medications:     acetaminophen (TYLENOL) tablet 650 mg, 650 mg, Oral, Q6H PRN, RAÚL Barnes-LOPEZ, 650 mg at 02/21/22 0341    atorvastatin (LIPITOR) tablet 20 mg, 20 mg, Oral, Daily With Dinner, ASHELY BarnesC, 20 mg at 02/20/22 1820    cefTRIAXone (ROCEPHIN) IVPB (premix in dextrose) 1,000 mg 50 mL, 1,000 mg, Intravenous, Q24H, Jo-Ann VELASCO PA-C, Last Rate: 100 mL/hr at 02/20/22 2120, 1,000 mg at 02/20/22 2120    dexamethasone (DECADRON) injection 6 mg, 6 mg, Intravenous, Q24H, Jo-Ann VELASCO PA-C, 6 mg at 02/20/22 1458    ferrous sulfate tablet 325 mg, 325 mg, Oral, Daily With Breakfast, Jo-Ann VELASCO PA-C    heparin (porcine) 25,000 units in 0 45% NaCl 250 mL infusion (premix), 3-20 Units/kg/hr (Order-Specific), Intravenous, Titrated, Daniel Ghosh MD, Last Rate: 4 8 mL/hr at 02/21/22 0225, 12 Units/kg/hr at 02/21/22 0225    heparin (porcine) injection 1,200 Units, 1,200 Units, Intravenous, Q1H PRN, Daniel Ghosh MD    heparin (porcine) injection 2,400 Units, 2,400 Units, Intravenous, Q1H PRN, Daniel Ghosh MD    levothyroxine tablet 25 mcg, 25 mcg, Oral, Early Morning, RAÚL Barnes-C, 25 mcg at 02/21/22 0544    mirtazapine (REMERON) tablet 15 mg, 15 mg, Oral, HS, RAÚL Barnes-C, 15 mg at 02/20/22 2136    [COMPLETED] remdesivir (Veklury) 200 mg in sodium chloride 0 9 % 290 mL IVPB, 200 mg, Intravenous, Q24H, 200 mg at 02/20/22 143 **FOLLOWED BY** remdesivir (Veklury) 100 mg in sodium chloride 0 9 % 270 mL IVPB, 100 mg, Intravenous, Q24H, Jo-Ann VELASCO PA-C    senna-docusate sodium (SENOKOT S) 8 6-50 mg per tablet 1 tablet, 1 tablet, Oral, Daily, Jo-Ann VELASCO PA-C    sertraline (ZOLOFT) tablet 25 mg, 25 mg, Oral, Daily, Jo-Ann VELASCO PA-C    sodium chloride 0 9 % infusion, 100 mL/hr, Intravenous, Continuous, Jo-Ann VELASCO PA-C, Last Rate: 100 mL/hr at 02/21/22 0225, 100 mL/hr at 02/21/22 0225    Objective  /62   Pulse 66   Temp (!) 94 5 °F (34 7 °C) (Rectal)   Resp 22   Ht 4' 11" (1 499 m)   Wt 44 kg (97 lb)   SpO2 90%   BMI 19 59 kg/m²     Physical Exam:   Physical Exam  Vitals and nursing note reviewed  Exam conducted with a chaperone present  Constitutional:       General: She is in acute distress  Appearance: She is well-developed  She is cachectic  She is ill-appearing  Interventions: Face mask in place  HENT:      Head: Normocephalic and atraumatic  Eyes:      Conjunctiva/sclera: Conjunctivae normal    Cardiovascular:      Rate and Rhythm: Normal rate and regular rhythm  Pulmonary:      Effort: Respiratory distress present  Musculoskeletal:      Cervical back: Neck supple  Skin:     General: Skin is warm and dry  Neurological:      Mental Status: She is alert  She is disoriented  Lab Results:   I have personally reviewed pertinent labs  , CBC:   Lab Results   Component Value Date    WBC 16 17 (H) 02/21/2022    HGB 7 7 (L) 02/21/2022    HCT 25 7 (L) 02/21/2022    MCV 84 02/21/2022     (H) 02/21/2022    MCH 25 1 (L) 02/21/2022    MCHC 30 0 (L) 02/21/2022    RDW 16 3 (H) 02/21/2022    MPV 8 9 02/21/2022   , CMP:   Lab Results   Component Value Date    SODIUM 144 02/21/2022    K 3 6 02/21/2022     02/21/2022    CO2 29 02/21/2022    CO2 34 (H) 02/20/2022    BUN 21 02/21/2022    CREATININE 0 42 (L) 02/21/2022    GLUCOSE 197 (H) 02/20/2022    CALCIUM 7 8 (L) 02/21/2022 AST 53 (H) 02/21/2022    ALT 56 02/21/2022    ALKPHOS 113 02/21/2022    EGFR 92 02/21/2022     Imaging Studies: I have personally reviewed pertinent reports  EKG, Pathology, and Other Studies: I have personally reviewed pertinent reports  Counseling / Coordination of Care  Total floor / unit time spent today 90 minutes  Greater than 50% of total time was spent with the patient and / or family counseling and / or coordination of care  A description of the counseling / coordination of care: Reviewed chart, provided medical updates, determined goals of care, discussed palliative care and symptom management, discussed comfort care and hospice care, discussed code status, determined competency and POA/HCA, determined social/family support, provided psychosocial support  Reviewed with SLIM team, RN and CM  Portions of this document may have been created using dictation software and as such some "sound alike" terms may have been generated by the system  Do not hesitate to contact me with any questions or clarifications

## 2022-02-21 NOTE — MALNUTRITION/BMI
This medical record reflects one or more clinical indicators suggestive of malnutrition and/or morbid obesity  Malnutrition Findings:   Adult Malnutrition type: Chronic illness  Adult Degree of Malnutrition: Other severe protein calorie malnutrition (Pt presents with severe protein calorie malnutrition as evidenced by sunken orbitals, prominent clavicel bone and temporal scooping  Treat with Regular diet and Ensure QID )  Malnutrition Characteristics: Fat loss,Muscle loss,Inadequate energy,Weight loss    BMI Findings: Body mass index is 19 59 kg/m²  See Nutrition note dated 2/21/22 for additional details  Completed nutrition assessment is viewable in the nutrition documentation

## 2022-02-21 NOTE — PROGRESS NOTES
[place]  Progress Note - Fern [place] 1934, 80 y o  female MRN: [id number]  Unit/Bed#: -[facility] Encounter: [id number]  Primary Care Provider: Susannah Fishman DO   Date and time admitted to hospital: 2/20/2022 11:41 AM    * Pneumonia due to COVID-19 virus  Assessment & Plan  Patient admitted with bilateral COVID pneumonia requiring oxygen via nasal cannula at 10 liters/minute last night  Currently patient is on 4 liters/minute  Her procalcitonin 0 46    Patient is being treated according to moderate COVID protocol  Continue IV Decadron, remdesivir, Rocephin and doxycycline  Continue IV heparin since patient's D-dimer was more than 2 5 on admission  Continue oxygen    I discussed the case with patient's granddaughter on the phone in detail on February 21st    Acute on chronic respiratory failure with hypoxia [facility])  Assessment & Plan  Patient had acute on chronic hypoxic respiratory failure due to COVID pneumonia as evidenced by respiratory more than 25 and hypoxia requiring treatment with oxygen via mid flow at 10 liters/minute  Acute hypoxic respiratory failure is improving:  Patient is on 4 liters/minute currently  She uses 2 liters/minute at home    Sepsis [facility])  Assessment & Plan  Patient met criteria for sepsis admission as evidenced by respiratory more than 20, heart rate more than 100, leukocytosis more than 10K due to COVID pneumonia  Blood cultures showed no growth so far    COPD (chronic obstructive pulmonary disease) [facility])  Assessment & Plan  Patient has chronic COPD  She had no COPD exacerbation to the hospital stay  Severe protein-calorie malnutrition ([facility] [address] )  Assessment & Plan  Malnutrition Findings:           BMI Findings: Body mass index is 19 59 kg/m²  · Dietary supplemenets     Patient has severe protein calorie malnutrition  She has diffuse atrophy of the muscles of the extremities and temporal atrophy      Patient's daughter told me that patient takes Ensure at home  I ordered it  VTE Prophylaxis: in place    Patient Centered Rounds: I rounded with patient's nurse    Current Length of Stay: 1 day(s)    Current Patient Status: Inpatient    Certification Statement: Pt requires additional inpatient hospital stay due to: see assessment and plan        Subjective:   Patient nurse reports that patient has severe presbycusis, oxygen flow rate is currently at 4 liters/minute  Patient was fed because of very poor p o  Intake  She had no vomiting, diarrhea, signs bleeding  Patient denies being on pain  Denies shortness of breath on oxygen    All other ROS are negative    Objective:     Vitals:   Temp (24hrs), Av 5 °F (36 4 °C), Min:94 5 °F (34 7 °C), Max:98 5 °F (36 9 °C)    Temp:  [94 5 °F (34 7 °C)-98 5 °F (36 9 °C)] 94 5 °F (34 7 °C)  HR:  [66-98] 66  Resp:  [18-32] 22  BP: (108-134)/(56-65) 108/62  SpO2:  [89 %-97 %] 90 %  Body mass index is 19 59 kg/m²  Input and Output Summary (last 24 hours): Intake/Output Summary (Last 24 hours) at 2022 1208  Last data filed at 2022 0910  Gross per 24 hour   Intake 50 ml   Output 180 ml   Net -130 ml       Physical Exam:     Physical Exam  Constitutional:       Appearance: She is ill-appearing  Comments: Cachectic   HENT:      Ears:      Comments: Patient had no cerumen impaction in the left ear, had small amount of wax in the right ear  Eyes:      Conjunctiva/sclera: Conjunctivae normal    Cardiovascular:      Rate and Rhythm: Normal rate and regular rhythm  Pulmonary:      Effort: No respiratory distress  Breath sounds: Rales (She has scant crackles heard posteriorly bilaterally) present  No wheezing  Abdominal:      General: Bowel sounds are normal  There is no distension  Palpations: Abdomen is soft  Tenderness: There is no abdominal tenderness  Skin:     General: Skin is warm and dry        Comments: She has no lower extremity edema Neurological:      Mental Status: She is alert  Comments: She is very hard of hearing, she has no facial droop, she moves all extremities on command, her speech is normal             I personally reviewed labs and imaging reports for today  Last 24 Hours Medication List:   Current Facility-Administered Medications   Medication Dose Route Frequency Provider Last Rate    acetaminophen  650 mg Oral Q6H PRN Levie Min V, PA-C      atorvastatin  20 mg Oral Daily With Transaq V, PA-C      cefTRIAXone  1,000 mg Intravenous Q24H Jo-Ann Skwirut V, PA-C 1,000 mg (02/20/22 2120)    dexamethasone  6 mg Intravenous Q24H Jo-Ann Skwirut V, PA-C      ferrous sulfate  325 mg Oral Daily With Breakfast Jo-Annmarge Selft V, PA-C      heparin (porcine)  3-20 Units/kg/hr (Order-Specific) Intravenous Titrated Angela Hernandez MD 12 Units/kg/hr (02/21/22 0225)    heparin (porcine)  1,200 Units Intravenous Q1H PRN Angela Hernandez MD      heparin (porcine)  2,400 Units Intravenous Q1H PRN Angela Hernandez MD      levothyroxine  25 mcg Oral Early Morning Levie Min V, PA-C      mirtazapine  15 mg Oral HS Jo-Ann Skwirut V, PA-C      remdesivir  100 mg Intravenous Q24H Jo-Ann Skwirut V, PA-C      senna-docusate sodium  1 tablet Oral Daily Jo-Ann Skwirut V, PA-C      sertraline  25 mg Oral Daily Levie Min V, PA-C      sodium chloride  100 mL/hr Intravenous Continuous Jo-Ann Selft V, PA-C 100 mL/hr (02/21/22 0225)          Today, Patient Was Seen By: Nayeli Eaton MD    ** Please Note: Dictation voice to text software may have been used in the creation of this document   **

## 2022-02-21 NOTE — ASSESSMENT & PLAN NOTE
Patient admitted with bilateral COVID pneumonia requiring oxygen via nasal cannula at 10 liters/minute last night  Currently patient is on 4 liters/minute  Her procalcitonin 0 46    Patient is being treated according to moderate COVID protocol  Continue IV Decadron, remdesivir, Rocephin and doxycycline    Continue IV heparin since patient's D-dimer was more than 2 5 on admission  Continue oxygen    I discussed the case with patient's granddaughter on the phone in detail on February 21st

## 2022-02-21 NOTE — PLAN OF CARE
Problem: INFECTION - ADULT  Goal: Absence or prevention of progression during hospitalization  Description: INTERVENTIONS:  - Assess and monitor for signs and symptoms of infection  - Monitor lab/diagnostic results  - Monitor all insertion sites, i e  indwelling lines, tubes, and drains  - Monitor endotracheal if appropriate and nasal secretions for changes in amount and color  - [place] appropriate cooling/warming therapies per order  - Administer medications as ordered  - Instruct and encourage patient and family to use good hand hygiene technique  - Identify and instruct in appropriate isolation precautions for identified infection/condition  Outcome: Not Progressing     Problem: Prexisting or High Potential for Compromised Skin Integrity  Goal: Skin integrity is maintained or improved  Description: INTERVENTIONS:  - Identify patients at risk for skin breakdown  - Assess and monitor skin integrity  - Assess and monitor nutrition and hydration status  - Monitor labs   - Assess for incontinence   - Turn and reposition patient  - Assist with mobility/ambulation  - Relieve pressure over bony prominences  - Avoid friction and shearing  - Provide appropriate hygiene as needed including keeping skin clean and dry  - Evaluate need for skin moisturizer/barrier cream  - Collaborate with interdisciplinary team   - Patient/family teaching  - Consider wound care consult   Outcome: Not Progressing     Problem: Nutrition/Hydration-ADULT  Goal: Nutrient/Hydration intake appropriate for improving, restoring or maintaining nutritional needs  Description: Monitor and assess patient's nutrition/hydration status for malnutrition  Collaborate with interdisciplinary team and initiate plan and interventions as ordered  Monitor patient's weight and dietary intake as ordered or per policy  Utilize nutrition screening tool and intervene as necessary   Determine patient's food preferences and provide high-protein, high-caloric foods as appropriate       INTERVENTIONS:  - Monitor oral intake, urinary output, labs, and treatment plans  - Assess nutrition and hydration status and recommend course of action  - Evaluate amount of meals eaten  - Assist patient with eating if necessary   - Allow adequate time for meals  - Recommend/ encourage appropriate diets, oral nutritional supplements, and vitamin/mineral supplements  - Order, calculate, and assess calorie counts as needed  - Recommend, monitor, and adjust tube feedings and TPN/PPN based on assessed needs  - Assess need for intravenous fluids  - Provide specific nutrition/hydration education as appropriate  - Include patient/family/caregiver in decisions related to nutrition  Outcome: Not Progressing

## 2022-02-21 NOTE — PLAN OF CARE
Problem: MOBILITY - ADULT  Goal: Maintain or return to baseline ADL function  Description: INTERVENTIONS:  -  Assess patient's ability to carry out ADLs; assess patient's baseline for ADL function and identify physical deficits which impact ability to perform ADLs (bathing, care of mouth/teeth, toileting, grooming, dressing, etc )  - Assess/evaluate cause of self-care deficits   - Assess range of motion  - Assess patient's mobility; develop plan if impaired  - Assess patient's need for assistive devices and provide as appropriate  - Encourage maximum independence but intervene and supervise when necessary  - Involve family in performance of ADLs  - Assess for home care needs following discharge   - Consider OT consult to assist with ADL evaluation and planning for discharge  - Provide patient education as appropriate  Outcome: Progressing  Goal: Maintains/Returns to pre admission functional level  Description: INTERVENTIONS:  - Perform BMAT or MOVE assessment daily    - Set and communicate daily mobility goal to care team and patient/family/caregiver  - Collaborate with rehabilitation services on mobility goals if consulted  - Perform Range of Motion 2 times a day  - Reposition patient every 2 hours    - Dangle patient 2 times a day  - Stand patient 2 times a day  - Ambulate patient 2 times a day  - Out of bed to chair 2 times a day   - Out of bed for meals 2 times a day  - Out of bed for toileting  - Record patient progress and toleration of activity level   Outcome: Progressing     Problem: Potential for Falls  Goal: Patient will remain free of falls  Description: INTERVENTIONS:  - Educate patient/family on patient safety including physical limitations  - Instruct patient to call for assistance with activity   - Consult OT/PT to assist with strengthening/mobility   - Keep Call bell within reach  - Keep bed low and locked with side rails adjusted as appropriate  - Keep care items and personal belongings within reach  - Initiate and maintain comfort rounds  - Make Fall Risk Sign visible to staff  - Offer Toileting every 2 Hours, in advance of need  - Initiate/Maintain bed alarm  - Obtain necessary fall risk management equipment: socks  - Apply yellow socks and bracelet for high fall risk patients  - Consider moving patient to room near nurses station  Outcome: Progressing     Problem: PAIN - ADULT  Goal: Verbalizes/displays adequate comfort level or baseline comfort level  Description: Interventions:  - Encourage patient to monitor pain and request assistance  - Assess pain using appropriate pain scale  - Administer analgesics based on type and severity of pain and evaluate response  - Implement non-pharmacological measures as appropriate and evaluate response  - Consider cultural and social influences on pain and pain management  - Notify physician/advanced practitioner if interventions unsuccessful or patient reports new pain  Outcome: Progressing     Problem: INFECTION - ADULT  Goal: Absence or prevention of progression during hospitalization  Description: INTERVENTIONS:  - Assess and monitor for signs and symptoms of infection  - Monitor lab/diagnostic results  - Monitor all insertion sites, i e  indwelling lines, tubes, and drains  - Monitor endotracheal if appropriate and nasal secretions for changes in amount and color  - [place] appropriate cooling/warming therapies per order  - Administer medications as ordered  - Instruct and encourage patient and family to use good hand hygiene technique  - Identify and instruct in appropriate isolation precautions for identified infection/condition  Outcome: Progressing  Goal: Absence of fever/infection during neutropenic period  Description: INTERVENTIONS:  - Monitor WBC    Outcome: Progressing     Problem: SAFETY ADULT  Goal: Maintain or return to baseline ADL function  Description: INTERVENTIONS:  -  Assess patient's ability to carry out ADLs; assess patient's baseline for ADL function and identify physical deficits which impact ability to perform ADLs (bathing, care of mouth/teeth, toileting, grooming, dressing, etc )  - Assess/evaluate cause of self-care deficits   - Assess range of motion  - Assess patient's mobility; develop plan if impaired  - Assess patient's need for assistive devices and provide as appropriate  - Encourage maximum independence but intervene and supervise when necessary  - Involve family in performance of ADLs  - Assess for home care needs following discharge   - Consider OT consult to assist with ADL evaluation and planning for discharge  - Provide patient education as appropriate  Outcome: Progressing  Goal: Maintains/Returns to pre admission functional level  Description: INTERVENTIONS:  - Perform BMAT or MOVE assessment daily    - Set and communicate daily mobility goal to care team and patient/family/caregiver  - Collaborate with rehabilitation services on mobility goals if consulted  - Perform Range of Motion 2 times a day  - Reposition patient every 2 hours    - Dangle patient 2 times a day  - Stand patient 2 times a day  - Ambulate patient 2 times a day  - Out of bed to chair 2 times a day   - Out of bed for meals 2 times a day  - Out of bed for toileting  - Record patient progress and toleration of activity level   Outcome: Progressing  Goal: Patient will remain free of falls  Description: INTERVENTIONS:  - Educate patient/family on patient safety including physical limitations  - Instruct patient to call for assistance with activity   - Consult OT/PT to assist with strengthening/mobility   - Keep Call bell within reach  - Keep bed low and locked with side rails adjusted as appropriate  - Keep care items and personal belongings within reach  - Initiate and maintain comfort rounds  - Make Fall Risk Sign visible to staff  - Offer Toileting every 2 Hours, in advance of need  - Initiate/Maintain bed alarm  - Obtain necessary fall risk management equipment: socks  - Apply yellow socks and bracelet for high fall risk patients  - Consider moving patient to room near nurses station  Outcome: Progressing     Problem: DISCHARGE PLANNING  Goal: Discharge to home or other facility with appropriate resources  Description: INTERVENTIONS:  - Identify barriers to discharge w/patient and caregiver  - Arrange for needed discharge resources and transportation as appropriate  - Identify discharge learning needs (meds, wound care, etc )  - Arrange for interpretive services to assist at discharge as needed  - Refer to Case Management Department for coordinating discharge planning if the patient needs post-hospital services based on physician/advanced practitioner order or complex needs related to functional status, cognitive ability, or social support system  Outcome: Progressing     Problem: Knowledge Deficit  Goal: Patient/family/caregiver demonstrates understanding of disease process, treatment plan, medications, and discharge instructions  Description: Complete learning assessment and assess knowledge base    Interventions:  - Provide teaching at level of understanding  - Provide teaching via preferred learning methods  Outcome: Progressing

## 2022-02-21 NOTE — ASSESSMENT & PLAN NOTE
Malnutrition Findings:           BMI Findings: Body mass index is 19 59 kg/m²  · Dietary supplemenets     Patient has severe protein calorie malnutrition  She has diffuse atrophy of the muscles of the extremities and temporal atrophy  Patient's daughter told me that patient takes Ensure at home  I ordered it

## 2022-02-21 NOTE — ASSESSMENT & PLAN NOTE
Patient has stage II sacral pressure ulcer present on admission without signs of cellulitis  Continue local care, frequent repositioning

## 2022-02-21 NOTE — CASE MANAGEMENT
Case Management Progress Note    Patient name Turner Orantes  Location [phone] MRN [id number]  : 1934 Date 2022       LOS (days): 1  Geometric Mean LOS (GMLOS) (days):   Days to GMLOS:        OBJECTIVE:        Current admission status: Inpatient  Preferred Pharmacy:   [facility] [address], [address] [facility]  [address] [facility]  [address] [facility] [address]  Phone: [phone] Fax: [phone]    Primary Care Provider: Dayna Batista DO    Primary Insurance: [facility]  Secondary Insurance:     PROGRESS NOTE:    [facility] accepted  CM added KAREN-TRC to pt's dc instructions

## 2022-02-21 NOTE — ASSESSMENT & PLAN NOTE
Patient had acute on chronic hypoxic respiratory failure due to COVID pneumonia as evidenced by respiratory more than 25 and hypoxia requiring treatment with oxygen via mid flow at 10 liters/minute  Acute hypoxic respiratory failure is improving:  Patient is on 4 liters/minute currently      She uses 2 liters/minute at home

## 2022-02-21 NOTE — ASSESSMENT & PLAN NOTE
Patient met criteria for sepsis admission as evidenced by respiratory more than 20, heart rate more than 100, leukocytosis more than 10K due to COVID pneumonia      Blood cultures showed no growth so far

## 2022-02-22 NOTE — PROGRESS NOTES
Progress Note - Palliative & Supportive Care  Berna Loza  80 y o   female  /-01   MRN: [id number]  Encounter: [id number]     Assessment/Problems Actively Addressed this Visit:  Goals of care counseling  Severe protein calorie malnutrition  Lymphoma  Acute on chronic respiratory failure with hypoxia  COVID-19 pneumonia  COPD  Stage II pressure injury     Goals of Care  Level 1 code status  Family conference tomorrow 2/24/22 @ noon  Plan to discuss code status, goals of care  Participants of family meeting will be:  -Jesus Escort: [phone] (daughter)  -Gonsalo Andersoney: [phone] (daughter)  -Caroline Good: [phone] (niece)    Plan  Symptom Management  Will defer symptom management to the primary team at this time  24 History  Chart reviewed before visit  Alyse Kendrick had an episode of desaturation and her oxygen was increased to 5L nasal cannula  Overnight was increased to 10 L  She is now down to 8 L  She also had a moment of hypotension  Today, she was able to drink her ensure completely  She waves at the window when she is waved to  Alyse Kendrick is daughter Lara has called to establish a time to meet together  Later today unfortunately does not work for all parties and therefore tomorrow at noon is the new meeting time  Information provided for all parties who will be on the call  Decisional apparatus:  Patient is not competent on exam today  If competence is lost, patient's substitute decision maker would default to 3 living children* by [place] Act 169  *Parvin's son Kirsten Zhao has deferred decision-making to his 2 sisters Vu Draper and Concetta Stephenson    Advance Directive/Living Will: yes, but not on file  POLST: no  POA Forms: no    Review of Systems:   Review of Systems   Unable to perform ROS: dementia     Medications    Current Facility-Administered Medications:     acetaminophen (TYLENOL) tablet 650 mg, 650 mg, Oral, Q6H PRN, Jo-Ann VELASCO PA-C, 650 mg at 02/21/22 0348   atorvastatin (LIPITOR) tablet 20 mg, 20 mg, Oral, Daily With Dinner, Jo-Ann Yañez V, PA-C, 20 mg at 02/21/22 1650    cefTRIAXone (ROCEPHIN) IVPB (premix in dextrose) 1,000 mg 50 mL, 1,000 mg, Intravenous, Q24H, Jo-Ann Yañez V PA-C, Last Rate: 100 mL/hr at 02/21/22 2111, 1,000 mg at 02/21/22 2111    dexamethasone (DECADRON) injection 6 mg, 6 mg, Intravenous, Q24H, Jo-Ann Yañez V, PA-C, 6 mg at 02/21/22 1403    doxycycline hyclate (VIBRAMYCIN) capsule 100 mg, 100 mg, Oral, Q12H [address], Thanh Short MD, 100 mg at 02/22/22 7821    ferrous sulfate tablet 325 mg, 325 mg, Oral, Daily With Breakfast, RAÚL Barnes-C, 325 mg at 02/22/22 6181    heparin (porcine) 25,000 units in 0 45% NaCl 250 mL infusion (premix), 3-20 Units/kg/hr (Order-Specific), Intravenous, Titrated, Jay Fontenot MD, Last Rate: 5 2 mL/hr at 02/22/22 0631, 13 Units/kg/hr at 02/22/22 0631    heparin (porcine) injection 1,200 Units, 1,200 Units, Intravenous, Q1H PRN, Jay Fontenot MD, 1,200 Units at 02/22/22 1353    heparin (porcine) injection 2,400 Units, 2,400 Units, Intravenous, Q1H PRN, Jay Fotnenot MD    levothyroxine tablet 25 mcg, 25 mcg, Oral, Early Morning, Jo-Ann Yañez V PA-C, 25 mcg at 02/22/22 0630    mirtazapine (REMERON) tablet 15 mg, 15 mg, Oral, HS, Jo-Ann Selft V, PA-C, 15 mg at 02/21/22 2111    [COMPLETED] remdesivir (Veklury) 200 mg in sodium chloride 0 9 % 290 mL IVPB, 200 mg, Intravenous, Q24H, 200 mg at 02/20/22 1439 **FOLLOWED BY** remdesivir (Veklury) 100 mg in sodium chloride 0 9 % 270 mL IVPB, 100 mg, Intravenous, Q24H, Jo-Ann VELASCO PA-C, Last Rate: 500 mL/hr at 02/21/22 1406, 100 mg at 02/21/22 1406    senna-docusate sodium (SENOKOT S) 8 6-50 mg per tablet 1 tablet, 1 tablet, Oral, Daily, Jo-Ann VELASCO PA-C, 1 tablet at 02/22/22 1681    sertraline (ZOLOFT) tablet 25 mg, 25 mg, Oral, Daily, Jo-Ann VELASCO PA-C, 25 mg at 02/22/22 0926    sodium chloride 0 9 % infusion, 100 mL/hr, Intravenous, Continuous, Jo-Ann VELASCO PA-C, Last Rate: 100 mL/hr at 02/22/22 0925, 100 mL/hr at 02/22/22 0925    Objective  BP (!) 113/49   Pulse 69   Temp (!) 95 4 °F (35 2 °C)   Resp 22   Ht 4' 11" (1 499 m)   Wt 44 kg (97 lb)   SpO2 93%   BMI 19 59 kg/m²     Physical Exam:   Physical Exam  Vitals and nursing note reviewed  Exam conducted with a chaperone present  Constitutional:       Appearance: She is cachectic  She is ill-appearing  Interventions: Nasal cannula in place  HENT:      Head: Normocephalic and atraumatic  Eyes:      Conjunctiva/sclera: Conjunctivae normal    Cardiovascular:      Rate and Rhythm: Normal rate and regular rhythm  Pulmonary:      Effort: Respiratory distress (mild) present  Breath sounds: Normal breath sounds  Comments: Increased respiratory effort  Musculoskeletal:      Cervical back: Neck supple  Skin:     General: Skin is warm and dry  Coloration: Skin is pale  Neurological:      Mental Status: She is alert  She is disoriented  Motor: Weakness present  Lab Results:   I have personally reviewed pertinent labs  , CBC:   Lab Results   Component Value Date    WBC 17 45 (H) 02/23/2022    HGB 7 1 (L) 02/23/2022    HCT 23 4 (L) 02/23/2022    MCV 82 02/23/2022     (H) 02/23/2022    MCH 24 8 (L) 02/23/2022    MCHC 30 3 (L) 02/23/2022    RDW 16 9 (H) 02/23/2022    MPV 8 9 02/23/2022   , CMP:   Lab Results   Component Value Date    SODIUM 145 02/23/2022    K 3 6 02/23/2022     (H) 02/23/2022    CO2 28 02/23/2022    BUN 20 02/23/2022    CREATININE 0 44 (L) 02/23/2022    CALCIUM 7 4 (L) 02/23/2022    AST 63 (H) 02/23/2022    ALT 60 02/23/2022    ALKPHOS 122 (H) 02/23/2022    EGFR 91 02/23/2022     Imaging Studies: I have personally reviewed pertinent reports  EKG, Pathology, and Other Studies: I have personally reviewed pertinent reports      Counseling / Coordination of Care  Total floor / unit time spent today 25 minutes  Greater than 50% of total time was spent with the patient and / or family counseling and / or coordinating of care  A description of the counseling / coordination of care: Chart reviewed,  provided medical updates, discussed palliative care and symptom management,  determined competency and POA/HCA, determined social/family support, provided psychosocial support  Reviewed with TRA[facility] team, RN and CM  MAGDALENA Rodas  Palliative & Supportive Care    Portions of this document may have been created using dictation software and as such some "sound alike" terms may have been generated by the system  Do not hesitate to contact me with any questions or clarifications

## 2022-02-22 NOTE — OCCUPATIONAL THERAPY NOTE
Occupational Therapy Refusal    Patient Name: Manohar Stephens  ANRRX'B Date: 2/22/2022 02/22/22 1150   OT Last Visit   OT Visit Date 02/22/22   Note Type   Note type Cancelled Session   Cancel Reasons Refusal   Additional Comments OT orders received and chart reviewed  Attempted to see patient for OT evaluation  Pt refuses to work with therapy, despite extensive education  Pt states she plans to return home and be bed-bound with family/caregivers to perform her self care in bed  Per chart, family is not interested in rehab and plans to take pt home  Pt will need medical transport into home as she is not transferring/ambulating at this time  Please reconsult if plan changes/pt is agreeable to participation       [employer], MS, OTR/L

## 2022-02-22 NOTE — PLAN OF CARE
Problem: MOBILITY - ADULT  Goal: Maintain or return to baseline ADL function  Description: INTERVENTIONS:  -  Assess patient's ability to carry out ADLs; assess patient's baseline for ADL function and identify physical deficits which impact ability to perform ADLs (bathing, care of mouth/teeth, toileting, grooming, dressing, etc )  - Assess/evaluate cause of self-care deficits   - Assess range of motion  - Assess patient's mobility; develop plan if impaired  - Assess patient's need for assistive devices and provide as appropriate  - Encourage maximum independence but intervene and supervise when necessary  - Involve family in performance of ADLs  - Assess for home care needs following discharge   - Consider OT consult to assist with ADL evaluation and planning for discharge  - Provide patient education as appropriate  Outcome: Progressing  Goal: Maintains/Returns to pre admission functional level  Description: INTERVENTIONS:  - Perform BMAT or MOVE assessment daily    - Set and communicate daily mobility goal to care team and patient/family/caregiver  - Collaborate with rehabilitation services on mobility goals if consulted  - Perform Range of Motion 2 times a day  - Reposition patient every 2 hours  - Dangle patient 2 times a day  - Stand patient 2 times a day  Problem: Knowledge Deficit  Goal: Patient/family/caregiver demonstrates understanding of disease process, treatment plan, medications, and discharge instructions  Description: Complete learning assessment and assess knowledge base    Interventions:  - Provide teaching at level of understanding  - Provide teaching via preferred learning methods  Outcome: Progressing     Problem: Prexisting or High Potential for Compromised Skin Integrity  Goal: Skin integrity is maintained or improved  Description: INTERVENTIONS:  - Identify patients at risk for skin breakdown  - Assess and monitor skin integrity  - Assess and monitor nutrition and hydration status  - Monitor labs   - Assess for incontinence   - Turn and reposition patient  - Assist with mobility/ambulation  - Relieve pressure over bony prominences  - Avoid friction and shearing  - Provide appropriate hygiene as needed including keeping skin clean and dry  - Evaluate need for skin moisturizer/barrier cream  - Collaborate with interdisciplinary team   - Patient/family teaching  - Consider wound care consult   Outcome: Progressing     - Ambulate patient 2 times a day  - Out of bed to chair 2 times a day   - Out of bed for meals 2 times a day  - Out of bed for toileting  - Record patient progress and toleration of activity level   Outcome: Progressing

## 2022-02-22 NOTE — QUICK NOTE
QUICK NOTE - Deterioration Index  Bijan Hyman 80 y o  female MRN: [id number]  Unit/Bed#: -01 Encounter: [id number]    Date Paged: 22  Time Paged: [phone]  Room #: MS3 314  Arrival Time: 0120  Deterioration index score at time of page: 70 3  %  Spoke with Patrick Wassemran, primary RN from primary team  Need to escalate level of care: no     PROBLEMS resulting in high DI score: Factors Contributing to Score    Contribution Factor Value   28% Pulse oximetry 0 %   19% Age 87   16% Supplemental oxygen Nasal cannula   11% Respiratory rate 22   9% [place] coma scale 14          PLAN:     Error in documentation of spo2 0% per primary RN Patrick Wasserman  No concerns about patient at this time    Please contact critical care via [employer] with any questions or concerns       Vitals:   Vitals:    22 2100 22 2116 22 2200 22 2343   BP:  (!) 113/49     Pulse:  71 69    Resp:       Temp:   (!) 95 4 °F (35 2 °C)    TempSrc:       SpO2: 92% (!) 88% 93% 93%   Weight:       Height:           Respiratory:  SpO2: SpO2: 93 %, SpO2 Activity: SpO2 Activity: At Rest, SpO2 Device: O2 Device: Nasal cannula  Nasal Cannula O2 Flow Rate (L/min): 4 L/min    Temperature: Temp (24hrs), Av 1 °F (35 1 °C), Min:94 5 °F (34 7 °C), Max:95 4 °F (35 2 °C)  Current: Temperature: (!) 95 4 °F (35 2 °C)    Labs:   Results from last 7 days   Lab Units 22  0909 22  0251 22  1155 22  1154   WBC Thousand/uL  --  16 17*  --  29 17*   HEMOGLOBIN g/dL  --  7 7*  --  9 5*   I STAT HEMOGLOBIN g/dl  --   --  10 2*  --    HEMATOCRIT %  --  25 7*  --  31 4*   HEMATOCRIT, ISTAT %  --   --  30*  --    PLATELETS Thousands/uL 549* 571*  --  630*   MONO PCT %  --  0*  --  1*     Results from last 7 days   Lab Units 22  0251 22  1155 22  1154   SODIUM mmol/L 144  --  140   POTASSIUM mmol/L 3 6  --  4 0   CHLORIDE mmol/L 106  --  98*   CO2 mmol/L 29  --  30   CO2, I-STAT mmol/L  --  34*  --    BUN mg/dL 21  -- 20   CREATININE mg/dL 0 42*  --  0 78   CALCIUM mg/dL 7 8*  --  8 6   ALK PHOS U/L 113  --  144*   ALT U/L 56  --  71   AST U/L 53*  --  69*   GLUCOSE, ISTAT mg/dl  --  197*  --          Results from last 7 days   Lab Units 02/20/22  1154   LACTIC ACID mmol/L 1 9         Results from last 7 days   Lab Units 02/21/22  0251 02/20/22  1154   PROCALCITONIN ng/ml 0 46* 0 49*       Code Status: Level 1 - Full Code

## 2022-02-22 NOTE — ASSESSMENT & PLAN NOTE
Patient admitted with bilateral COVID pneumonia requiring oxygen via nasal cannula     Patient breathing is worse today  I hear more rhonchi and crackles  Chest x-ray showed increasing infiltrates as well  Patient currently is on 10 liters/minute mid flow oxygen  I added baricitinib to remdesivir and Decadron  I discontinued IV fluids and ordered Lasix 20 mg IV x1    Systolic blood pressure is 140 this afternoon    I discussed the case with patient's granddaughter on the phone in detail on February 22nd

## 2022-02-22 NOTE — ASSESSMENT & PLAN NOTE
Malnutrition Findings:   Adult Malnutrition type: Chronic illness  Adult Degree of Malnutrition: Other severe protein calorie malnutrition (Pt presents with severe protein calorie malnutrition as evidenced by sunken orbitals, prominent clavicel bone and temporal scooping  Treat with Regular diet and Ensure QID )    BMI Findings: Body mass index is 19 59 kg/m²  · Dietary supplemenets     Patient has severe protein calorie malnutrition  She has diffuse atrophy of the muscles of the extremities and temporal atrophy  Patient's daughter told me that patient takes Ensure at home  I ordered it

## 2022-02-22 NOTE — PROGRESS NOTES
[place]  Progress Note - Shea Mccoy 1934, 80 y o  female MRN: [id number]  Unit/Bed#: -[facility] Encounter: [id number]  Primary Care Provider: Lawrence Leung DO   Date and time admitted to hospital: 2/20/2022 11:41 AM    * Pneumonia due to COVID-19 virus  Assessment & Plan  Patient admitted with bilateral COVID pneumonia requiring oxygen via nasal cannula     Patient breathing is worse today  I hear more rhonchi and crackles  Chest x-ray showed increasing infiltrates as well  Patient currently is on 10 liters/minute mid flow oxygen  I added baricitinib to remdesivir and Decadron  I discontinued IV fluids and ordered Lasix 20 mg IV x1  Systolic blood pressure is 140 this afternoon    I discussed the case with patient's granddaughter on the phone in detail on February 22nd    Acute on chronic respiratory failure with hypoxia [facility])  Assessment & Plan  Patient had acute on chronic hypoxic respiratory failure due to COVID pneumonia as evidenced by respiratory more than 25 and hypoxia requiring treatment with oxygen via mid flow at 10 liters/minute  She uses 2 liters/minute at home    COPD (chronic obstructive pulmonary disease) [facility])  Assessment & Plan  Patient has chronic COPD  She had no COPD exacerbation to the hospital stay  Severe protein-calorie malnutrition ([address] )  Assessment & Plan  Malnutrition Findings:   Adult Malnutrition type: Chronic illness  Adult Degree of Malnutrition: Other severe protein calorie malnutrition (Pt presents with severe protein calorie malnutrition as evidenced by sunken orbitals, prominent clavicel bone and temporal scooping  Treat with Regular diet and Ensure QID )    BMI Findings: Body mass index is 19 59 kg/m²  · Dietary supplemenets     Patient has severe protein calorie malnutrition  She has diffuse atrophy of the muscles of the extremities and temporal atrophy      Patient's daughter told me that patient takes Ensure at home  I ordered it  Pressure injury of sacral region, stage 2 [facility])  Assessment & Plan  Patient has stage II sacral pressure ulcer present on admission without signs of cellulitis  Continue local care, frequent repositioning  VTE Prophylaxis: in place    Patient Centered Rounds: I rounded with patient's nurse    Current Length of Stay: 2 day(s)    Current Patient Status: Inpatient    Certification Statement: Pt requires additional inpatient hospital stay due to: see assessment and plan        Subjective:   Patient's nurse told me that patient's oxygen requirements increased and currently she is on 10 liters/minute mid flow oxygen  She had poor p o  Intake but she drank 2 cups of fluids without coughing or choking when swallowing  She has had no vomiting or diarrhea  Patient continues to be very hard of hearing  She denies shortness of breath on oxygen, denies chest pain, nausea abdominal pain  All other ROS are negative    Objective:     Vitals:   Temp (24hrs), Av 2 °F (36 2 °C), Min:95 4 °F (35 2 °C), Max:98 9 °F (37 2 °C)    Temp:  [95 4 °F (35 2 °C)-98 9 °F (37 2 °C)] 98 9 °F (37 2 °C)  HR:  [] 102  Resp:  [21] 21  BP: (113-144)/(49-74) 144/74  SpO2:  [88 %-93 %] 91 %  Body mass index is 19 59 kg/m²  Input and Output Summary (last 24 hours): Intake/Output Summary (Last 24 hours) at 2022 1705  Last data filed at 2022 1503  Gross per 24 hour   Intake 1671 6 ml   Output --   Net 1671 6 ml       Physical Exam:     Physical Exam  Constitutional:       Appearance: She is ill-appearing  HENT:      Head: Normocephalic  Mouth/Throat:      Mouth: Mucous membranes are dry  Eyes:      Conjunctiva/sclera: Conjunctivae normal    Cardiovascular:      Rate and Rhythm: Normal rate and regular rhythm  Pulmonary:      Comments: Patient has mild conversational dyspnea    I heard inspiratory crackles and rhonchi bilaterally I heard no expiratory wheezing  Abdominal: General: Bowel sounds are normal       Palpations: Abdomen is soft  Tenderness: There is no abdominal tenderness  Skin:     General: Skin is warm and dry  Comments: Patient has no lower extremity edema   Neurological:      Mental Status: She is alert  Comments: Patient has no facial droop, she moves all extremities on command, speech is normal             I personally reviewed labs and imaging reports for today  Last 24 Hours Medication List:   Current Facility-Administered Medications   Medication Dose Route Frequency Provider Last Rate    acetaminophen  650 mg Oral Q6H PRN Mina VELASCO PA-C      atorvastatin  20 mg Oral Daily With Kairos AR PATI VELASCO      Baricitinib  4 mg Oral Q24H Frank Bullock MD      cefTRIAXone  1,000 mg Intravenous Q24H RAÚL Barnes-C 1,000 mg (02/21/22 2111)    dexamethasone  6 mg Intravenous Q24H RAÚL Barnes-LOPEZ      doxycycline hyclate  100 mg Oral Q12H [address] Frank Bullock MD      ferrous sulfate  325 mg Oral Daily With [place] [id number] PATI VELASCO      furosemide  20 mg Intravenous Once Frank Bullock MD      heparin (porcine)  3-20 Units/kg/hr (Order-Specific) Intravenous Titrated Jeremías Licona MD 17 Units/kg/hr (02/22/22 1659)    heparin (porcine)  1,200 Units Intravenous Q1H PRN Jeremías Licona MD      heparin (porcine)  2,400 Units Intravenous Q1H PRN Jeremías Licona MD      levothyroxine  25 mcg Oral Early Morning Mina VELASCO PA-C      mirtazapine  15 mg Oral HS Jo-Ann VELASCO PA-C      remdesivir  100 mg Intravenous Q24H RAÚL Barnes-C 100 mg (02/21/22 1406)    senna-docusate sodium  1 tablet Oral Daily RAÚL Barnes-LOPEZ      sertraline  25 mg Oral Daily Jo-Ann VELASCO PA-C            Today, Patient Was Seen By: Frank Bullock MD    ** Please Note: Dictation voice to text software may have been used in the creation of this document   **

## 2022-02-22 NOTE — PHYSICAL THERAPY NOTE
PT refusal     02/22/22 1157   PT Last Visit   PT Visit Date 02/22/22   Note Type   Note type Cancelled Session   Cancel Reasons Refusal   Additional Comments   (Attemped eval Pt refuses all mobility/tx/ oob d/c PT )   Lupe Diaz, PT

## 2022-02-22 NOTE — PLAN OF CARE
Problem: MOBILITY - ADULT  Goal: Maintain or return to baseline ADL function  Description: INTERVENTIONS:  -  Assess patient's ability to carry out ADLs; assess patient's baseline for ADL function and identify physical deficits which impact ability to perform ADLs (bathing, care of mouth/teeth, toileting, grooming, dressing, etc )  - Assess/evaluate cause of self-care deficits   - Assess range of motion  - Assess patient's mobility; develop plan if impaired  - Assess patient's need for assistive devices and provide as appropriate  - Encourage maximum independence but intervene and supervise when necessary  - Involve family in performance of ADLs  - Assess for home care needs following discharge   - Consider OT consult to assist with ADL evaluation and planning for discharge  - Provide patient education as appropriate  Outcome: Progressing  Goal: Maintains/Returns to pre admission functional level  Description: INTERVENTIONS:  - Perform BMAT or MOVE assessment daily    - Set and communicate daily mobility goal to care team and patient/family/caregiver  - Collaborate with rehabilitation services on mobility goals if consulted  - Perform Range of Motion 2 times a day  - Reposition patient every 2 hours    - Dangle patient 2 times a day  - Stand patient 2 times a day  - Ambulate patient 2 times a day  - Out of bed to chair 2 times a day   - Out of bed for meals 2 times a day  - Out of bed for toileting  - Record patient progress and toleration of activity level   Outcome: Progressing     Problem: Potential for Falls  Goal: Patient will remain free of falls  Description: INTERVENTIONS:  - Educate patient/family on patient safety including physical limitations  - Instruct patient to call for assistance with activity   - Consult OT/PT to assist with strengthening/mobility   - Keep Call bell within reach  - Keep bed low and locked with side rails adjusted as appropriate  - Keep care items and personal belongings within reach  - Initiate and maintain comfort rounds  - Make Fall Risk Sign visible to staff  - Offer Toileting every 2 Hours, in advance of need  - Initiate/Maintain bed alarm  - Obtain necessary fall risk management equipment: socks  - Apply yellow socks and bracelet for high fall risk patients  - Consider moving patient to room near nurses station  Outcome: Progressing     Problem: PAIN - ADULT  Goal: Verbalizes/displays adequate comfort level or baseline comfort level  Description: Interventions:  - Encourage patient to monitor pain and request assistance  - Assess pain using appropriate pain scale  - Administer analgesics based on type and severity of pain and evaluate response  - Implement non-pharmacological measures as appropriate and evaluate response  - Consider cultural and social influences on pain and pain management  - Notify physician/advanced practitioner if interventions unsuccessful or patient reports new pain  Outcome: Progressing     Problem: INFECTION - ADULT  Goal: Absence or prevention of progression during hospitalization  Description: INTERVENTIONS:  - Assess and monitor for signs and symptoms of infection  - Monitor lab/diagnostic results  - Monitor all insertion sites, i e  indwelling lines, tubes, and drains  - Monitor endotracheal if appropriate and nasal secretions for changes in amount and color  - Union appropriate cooling/warming therapies per order  - Administer medications as ordered  - Instruct and encourage patient and family to use good hand hygiene technique  - Identify and instruct in appropriate isolation precautions for identified infection/condition  Outcome: Progressing  Goal: Absence of fever/infection during neutropenic period  Description: INTERVENTIONS:  - Monitor WBC    Outcome: Progressing     Problem: SAFETY ADULT  Goal: Maintain or return to baseline ADL function  Description: INTERVENTIONS:  -  Assess patient's ability to carry out ADLs; assess patient's baseline for ADL function and identify physical deficits which impact ability to perform ADLs (bathing, care of mouth/teeth, toileting, grooming, dressing, etc )  - Assess/evaluate cause of self-care deficits   - Assess range of motion  - Assess patient's mobility; develop plan if impaired  - Assess patient's need for assistive devices and provide as appropriate  - Encourage maximum independence but intervene and supervise when necessary  - Involve family in performance of ADLs  - Assess for home care needs following discharge   - Consider OT consult to assist with ADL evaluation and planning for discharge  - Provide patient education as appropriate  Outcome: Progressing  Goal: Maintains/Returns to pre admission functional level  Description: INTERVENTIONS:  - Perform BMAT or MOVE assessment daily    - Set and communicate daily mobility goal to care team and patient/family/caregiver  - Collaborate with rehabilitation services on mobility goals if consulted  - Perform Range of Motion 2 times a day  - Reposition patient every 2 hours    - Dangle patient 2 times a day  - Stand patient 2 times a day  - Ambulate patient 2 times a day  - Out of bed to chair 2 times a day   - Out of bed for meals 2 times a day  - Out of bed for toileting  - Record patient progress and toleration of activity level   Outcome: Progressing  Goal: Patient will remain free of falls  Description: INTERVENTIONS:  - Educate patient/family on patient safety including physical limitations  - Instruct patient to call for assistance with activity   - Consult OT/PT to assist with strengthening/mobility   - Keep Call bell within reach  - Keep bed low and locked with side rails adjusted as appropriate  - Keep care items and personal belongings within reach  - Initiate and maintain comfort rounds  - Make Fall Risk Sign visible to staff  - Offer Toileting every 2 Hours, in advance of need  - Initiate/Maintain bed alarm  - Obtain necessary fall risk management equipment: socks  - Apply yellow socks and bracelet for high fall risk patients  - Consider moving patient to room near nurses station  Outcome: Progressing     Problem: DISCHARGE PLANNING  Goal: Discharge to home or other facility with appropriate resources  Description: INTERVENTIONS:  - Identify barriers to discharge w/patient and caregiver  - Arrange for needed discharge resources and transportation as appropriate  - Identify discharge learning needs (meds, wound care, etc )  - Arrange for interpretive services to assist at discharge as needed  - Refer to Case Management Department for coordinating discharge planning if the patient needs post-hospital services based on physician/advanced practitioner order or complex needs related to functional status, cognitive ability, or social support system  Outcome: Progressing     Problem: Knowledge Deficit  Goal: Patient/family/caregiver demonstrates understanding of disease process, treatment plan, medications, and discharge instructions  Description: Complete learning assessment and assess knowledge base    Interventions:  - Provide teaching at level of understanding  - Provide teaching via preferred learning methods  Outcome: Progressing     Problem: Prexisting or High Potential for Compromised Skin Integrity  Goal: Skin integrity is maintained or improved  Description: INTERVENTIONS:  - Identify patients at risk for skin breakdown  - Assess and monitor skin integrity  - Assess and monitor nutrition and hydration status  - Monitor labs   - Assess for incontinence   - Turn and reposition patient  - Assist with mobility/ambulation  - Relieve pressure over bony prominences  - Avoid friction and shearing  - Provide appropriate hygiene as needed including keeping skin clean and dry  - Evaluate need for skin moisturizer/barrier cream  - Collaborate with interdisciplinary team   - Patient/family teaching  - Consider wound care consult   Outcome: Progressing     Problem: Nutrition/Hydration-ADULT  Goal: Nutrient/Hydration intake appropriate for improving, restoring or maintaining nutritional needs  Description: Monitor and assess patient's nutrition/hydration status for malnutrition  Collaborate with interdisciplinary team and initiate plan and interventions as ordered  Monitor patient's weight and dietary intake as ordered or per policy  Utilize nutrition screening tool and intervene as necessary  Determine patient's food preferences and provide high-protein, high-caloric foods as appropriate       INTERVENTIONS:  - Monitor oral intake, urinary output, labs, and treatment plans  - Assess nutrition and hydration status and recommend course of action  - Evaluate amount of meals eaten  - Assist patient with eating if necessary   - Allow adequate time for meals  - Recommend/ encourage appropriate diets, oral nutritional supplements, and vitamin/mineral supplements  - Order, calculate, and assess calorie counts as needed  - Recommend, monitor, and adjust tube feedings and TPN/PPN based on assessed needs  - Assess need for intravenous fluids  - Provide specific nutrition/hydration education as appropriate  - Include patient/family/caregiver in decisions related to nutrition  Outcome: Progressing

## 2022-02-22 NOTE — ASSESSMENT & PLAN NOTE
Patient had acute on chronic hypoxic respiratory failure due to COVID pneumonia as evidenced by respiratory more than 25 and hypoxia requiring treatment with oxygen via mid flow at 10 liters/minute        She uses 2 liters/minute at home

## 2022-02-23 NOTE — ASSESSMENT & PLAN NOTE
Patient admitted with bilateral COVID pneumonia requiring oxygen via nasal cannula     Patient requires oxygen via mid flow at 10 from it and she appears to be comfortable on that without shortness of breath this morning  Patient's systolic blood pressure was 83 this morning but on repeat blood pressure measurement it was 832 systolic  Patient denies dizziness or lightheadedness  Will monitor closely    Continue baricitinib, dexamethasone, remdesivir    Procalcitonin is decreasing, will recheck it tomorrow I will consider discontinuing Rocephin and doxycycline    Continue IV heparin for DVT prevention  Patient's hemoglobin dropped to 7 1 but she has no visible GI or  bleeding    Will monitor hemoglobin closely    I left a message on [employer] on February 23rd

## 2022-02-23 NOTE — PROGRESS NOTES
[place]  Progress Note - Kylie Moses 1934, 80 y o  female MRN: [id number]  Unit/Bed#: -[facility] Encounter: [id number]  Primary Care Provider: Hernando Truong DO   Date and time admitted to hospital: 2/20/2022 11:41 AM    * Pneumonia due to COVID-19 virus  Assessment & Plan  Patient admitted with bilateral COVID pneumonia requiring oxygen via nasal cannula     Patient requires oxygen via mid flow at 10 from it and she appears to be comfortable on that without shortness of breath this morning  Patient's systolic blood pressure was 83 this morning but on repeat blood pressure measurement it was 937 systolic  Patient denies dizziness or lightheadedness  Will monitor closely    Continue baricitinib, dexamethasone, remdesivir    Procalcitonin is decreasing, will recheck it tomorrow I will consider discontinuing Rocephin and doxycycline    Continue IV heparin for DVT prevention  Patient's hemoglobin dropped to 7 1 but she has no visible GI or  bleeding  Will monitor hemoglobin closely    I left a message on [employer] on February 23rd    Acute on chronic respiratory failure with hypoxia [facility])  Assessment & Plan  Patient had acute on chronic hypoxic respiratory failure due to COVID pneumonia as evidenced by respiratory more than 25 and hypoxia requiring treatment with oxygen via mid flow at 10 liters/minute  She uses 2 liters/minute at home    COPD (chronic obstructive pulmonary disease) [facility])  Assessment & Plan  Patient has chronic COPD  She had no COPD exacerbation to the hospital stay  Severe protein-calorie malnutrition ([address] )  Assessment & Plan  Malnutrition Findings:   Adult Malnutrition type: Chronic illness  Adult Degree of Malnutrition: Other severe protein calorie malnutrition (Pt presents with severe protein calorie malnutrition as evidenced by sunken orbitals, prominent clavicel bone and temporal scooping   Treat with Regular diet and Ensure QID )    BMI Findings: Body mass index is 19 59 kg/m²  · Dietary supplemenets     Patient has severe protein calorie malnutrition  She has diffuse atrophy of the muscles of the extremities and temporal atrophy  Continue and short truck with nutritional supplements    Pressure injury of sacral region, stage 2 [facility])  Assessment & Plan  Patient has stage II sacral pressure ulcer present on admission without signs of cellulitis  Continue local care, frequent repositioning  VTE Prophylaxis: in place    Patient Centered Rounds: I rounded with patient's nurse    Current Length of Stay: 3 day(s)    Current Patient Status: Inpatient    Certification Statement: Pt requires additional inpatient hospital stay due to: see assessment and plan        Subjective:   Patient denies chest pain, pleurisy, shortness of breath  She has a dry cough  Denies nausea abdominal pain  Patient's nurse reports no signs of GI or  bleeding  All other ROS are negative    Objective:     Vitals:   Temp (24hrs), Av 5 °F (36 4 °C), Min:96 7 °F (35 9 °C), Max:98 9 °F (37 2 °C)    Temp:  [96 7 °F (35 9 °C)-98 9 °F (37 2 °C)] 98 3 °F (36 8 °C)  HR:  [] 82  Resp:  [20-21] 20  BP: ()/(51-74) 124/67  SpO2:  [90 %-97 %] 96 %  Body mass index is 19 59 kg/m²  Input and Output Summary (last 24 hours): Intake/Output Summary (Last 24 hours) at 2022 0940  Last data filed at 2022 0908  Gross per 24 hour   Intake 1851 6 ml   Output 555 ml   Net 1296 6 ml       Physical Exam:     Physical Exam  HENT:      Head: Normocephalic  Eyes:      Conjunctiva/sclera: Conjunctivae normal    Neck:      Comments: She had no JVD  Cardiovascular:      Rate and Rhythm: Normal rate and regular rhythm  Heart sounds: No murmur heard  Pulmonary:      Effort: No respiratory distress  Breath sounds: Rales (Scant crackles are heard posteriorly) present  No wheezing ( I heard no expiratory wheezing)  Abdominal:      General: Bowel sounds are normal       Palpations: Abdomen is soft  Tenderness: There is no abdominal tenderness  Musculoskeletal:      Cervical back: Neck supple  Skin:     General: Skin is warm and dry  Comments: She had no pedal edema   Neurological:      Mental Status: She is alert  Cranial Nerves: No cranial nerve deficit ( she had no facial droop)  Motor: No weakness ( she moves all extremities on command)  I personally reviewed labs and imaging reports for today  Last 24 Hours Medication List:   Current Facility-Administered Medications   Medication Dose Route Frequency Provider Last Rate    acetaminophen  650 mg Oral Q6H PRN Javon Brawn KRISTA, PA-C      atorvastatin  20 mg Oral Daily With [employer] PATI VELASCO      Baricitinib  4 mg Oral Q24H Thanh Short MD      cefTRIAXone  1,000 mg Intravenous Q24H Jo-Ann Selft V, PA-C 1,000 mg (02/22/22 2143)    dexamethasone  6 mg Intravenous Q24H Jo-Ann Selft KRISTA, PA-C      doxycycline hyclate  100 mg Oral Q12H [address] Thanh Short MD      ferrous sulfate  325 mg Oral Daily With Breakfast Javon Godfrey V, PA-C      heparin (porcine)  3-20 Units/kg/hr (Order-Specific) Intravenous Titrated Jay Fontenot MD 17 Units/kg/hr (02/23/22 0434)    heparin (porcine)  1,200 Units Intravenous Q1H PRN Jay Fontenot MD      heparin (porcine)  2,400 Units Intravenous Q1H PRN Jay Fontenot MD      levothyroxine  25 mcg Oral Early Morning Javon Brawn KRISTA, PA-C      mirtazapine  15 mg Oral HS Jo-Ann Yañez V, PA-C      remdesivir  100 mg Intravenous Q24H Jo-Ann Selft V, PA-C 100 mg (02/21/22 1406)    senna-docusate sodium  1 tablet Oral Daily Jo-Ann Selft KRISTA, PA-C      sertraline  25 mg Oral Daily Jo-Ann Yañez V, PA-C            Today, Patient Was Seen By: Thanh Short MD    ** Please Note: Dictation voice to text software may have been used in the creation of this document   **

## 2022-02-23 NOTE — TELEPHONE ENCOUNTER
Pt's family member, Bud Ornelas, called office back to coordinate a family meeting  We were able to secure tomorrow at noon as a family phone conference and Lara offered each participants phone number      Lara: [address]: [address] [place] [address] [place]: [phone]

## 2022-02-23 NOTE — ASSESSMENT & PLAN NOTE
Malnutrition Findings:   Adult Malnutrition type: Chronic illness  Adult Degree of Malnutrition: Other severe protein calorie malnutrition (Pt presents with severe protein calorie malnutrition as evidenced by sunken orbitals, prominent clavicel bone and temporal scooping  Treat with Regular diet and Ensure QID )    BMI Findings: Body mass index is 19 59 kg/m²  · Dietary supplemenets     Patient has severe protein calorie malnutrition  She has diffuse atrophy of the muscles of the extremities and temporal atrophy      Continue and short truck with nutritional supplements

## 2022-02-24 NOTE — ASSESSMENT & PLAN NOTE
Malnutrition Findings:   Adult Malnutrition type: Chronic illness  Adult Degree of Malnutrition: Other severe protein calorie malnutrition (Pt presents with severe protein calorie malnutrition as evidenced by sunken orbitals, prominent clavicel bone and temporal scooping  Treat with Regular diet and Ensure QID )    BMI Findings: Body mass index is 19 59 kg/m²  · Dietary supplemenets     Patient has severe protein calorie malnutrition  She has diffuse atrophy of the muscles of the extremities and temporal atrophy      Continue chocolate Ensure nutritional supplements

## 2022-02-24 NOTE — PROGRESS NOTES
I attempted to visit the patient and gently awake her to see if she wanted me to pray for her, but she did not rouse        02/24/22 1100   Clinical Encounter Type   Visited With Patient   Routine Visit Introduction   Referral To   (census/rounds)

## 2022-02-24 NOTE — PLAN OF CARE
Problem: MOBILITY - ADULT  Goal: Maintain or return to baseline ADL function  Description: INTERVENTIONS:  -  Assess patient's ability to carry out ADLs; assess patient's baseline for ADL function and identify physical deficits which impact ability to perform ADLs (bathing, care of mouth/teeth, toileting, grooming, dressing, etc )  - Assess/evaluate cause of self-care deficits   - Assess range of motion  - Assess patient's mobility; develop plan if impaired  - Assess patient's need for assistive devices and provide as appropriate  - Encourage maximum independence but intervene and supervise when necessary  - Involve family in performance of ADLs  - Assess for home care needs following discharge   - Consider OT consult to assist with ADL evaluation and planning for discharge  - Provide patient education as appropriate  Outcome: Progressing  Goal: Maintains/Returns to pre admission functional level  Description: INTERVENTIONS:  - Perform BMAT or MOVE assessment daily    - Set and communicate daily mobility goal to care team and patient/family/caregiver  - Collaborate with rehabilitation services on mobility goals if consulted  - Perform Range of Motion 2 times a day  - Reposition patient every 2 hours    - Dangle patient 2 times a day  - Stand patient 2 times a day  - Ambulate patient 2 times a day  - Out of bed to chair 2 times a day   - Out of bed for meals 2 times a day  - Out of bed for toileting  - Record patient progress and toleration of activity level   Outcome: Progressing     Problem: Potential for Falls  Goal: Patient will remain free of falls  Description: INTERVENTIONS:  - Educate patient/family on patient safety including physical limitations  - Instruct patient to call for assistance with activity   - Consult OT/PT to assist with strengthening/mobility   - Keep Call bell within reach  - Keep bed low and locked with side rails adjusted as appropriate  - Keep care items and personal belongings within reach  - Initiate and maintain comfort rounds  - Make Fall Risk Sign visible to staff  - Offer Toileting every 2 Hours, in advance of need  - Initiate/Maintain bed alarm  - Obtain necessary fall risk management equipment: socks  - Apply yellow socks and bracelet for high fall risk patients  - Consider moving patient to room near nurses station  Outcome: Progressing     Problem: PAIN - ADULT  Goal: Verbalizes/displays adequate comfort level or baseline comfort level  Description: Interventions:  - Encourage patient to monitor pain and request assistance  - Assess pain using appropriate pain scale  - Administer analgesics based on type and severity of pain and evaluate response  - Implement non-pharmacological measures as appropriate and evaluate response  - Consider cultural and social influences on pain and pain management  - Notify physician/advanced practitioner if interventions unsuccessful or patient reports new pain  Outcome: Progressing     Problem: INFECTION - ADULT  Goal: Absence or prevention of progression during hospitalization  Description: INTERVENTIONS:  - Assess and monitor for signs and symptoms of infection  - Monitor lab/diagnostic results  - Monitor all insertion sites, i e  indwelling lines, tubes, and drains  - Monitor endotracheal if appropriate and nasal secretions for changes in amount and color  - [place] appropriate cooling/warming therapies per order  - Administer medications as ordered  - Instruct and encourage patient and family to use good hand hygiene technique  - Identify and instruct in appropriate isolation precautions for identified infection/condition  Outcome: Progressing  Goal: Absence of fever/infection during neutropenic period  Description: INTERVENTIONS:  - Monitor WBC    Outcome: Progressing     Problem: SAFETY ADULT  Goal: Maintain or return to baseline ADL function  Description: INTERVENTIONS:  -  Assess patient's ability to carry out ADLs; assess patient's baseline for ADL function and identify physical deficits which impact ability to perform ADLs (bathing, care of mouth/teeth, toileting, grooming, dressing, etc )  - Assess/evaluate cause of self-care deficits   - Assess range of motion  - Assess patient's mobility; develop plan if impaired  - Assess patient's need for assistive devices and provide as appropriate  - Encourage maximum independence but intervene and supervise when necessary  - Involve family in performance of ADLs  - Assess for home care needs following discharge   - Consider OT consult to assist with ADL evaluation and planning for discharge  - Provide patient education as appropriate  Outcome: Progressing  Goal: Maintains/Returns to pre admission functional level  Description: INTERVENTIONS:  - Perform BMAT or MOVE assessment daily    - Set and communicate daily mobility goal to care team and patient/family/caregiver  - Collaborate with rehabilitation services on mobility goals if consulted  - Perform Range of Motion 2 times a day  - Reposition patient every 2 hours    - Dangle patient 2 times a day  - Stand patient 2 times a day  - Ambulate patient 2 times a day  - Out of bed to chair 2 times a day   - Out of bed for meals 2 times a day  - Out of bed for toileting  - Record patient progress and toleration of activity level   Outcome: Progressing  Goal: Patient will remain free of falls  Description: INTERVENTIONS:  - Educate patient/family on patient safety including physical limitations  - Instruct patient to call for assistance with activity   - Consult OT/PT to assist with strengthening/mobility   - Keep Call bell within reach  - Keep bed low and locked with side rails adjusted as appropriate  - Keep care items and personal belongings within reach  - Initiate and maintain comfort rounds  - Make Fall Risk Sign visible to staff  - Offer Toileting every 2 Hours, in advance of need  - Initiate/Maintain bed alarm  - Obtain necessary fall risk management equipment: socks  - Apply yellow socks and bracelet for high fall risk patients  - Consider moving patient to room near nurses station  Outcome: Progressing     Problem: DISCHARGE PLANNING  Goal: Discharge to home or other facility with appropriate resources  Description: INTERVENTIONS:  - Identify barriers to discharge w/patient and caregiver  - Arrange for needed discharge resources and transportation as appropriate  - Identify discharge learning needs (meds, wound care, etc )  - Arrange for interpretive services to assist at discharge as needed  - Refer to Case Management Department for coordinating discharge planning if the patient needs post-hospital services based on physician/advanced practitioner order or complex needs related to functional status, cognitive ability, or social support system  Outcome: Progressing     Problem: Knowledge Deficit  Goal: Patient/family/caregiver demonstrates understanding of disease process, treatment plan, medications, and discharge instructions  Description: Complete learning assessment and assess knowledge base    Interventions:  - Provide teaching at level of understanding  - Provide teaching via preferred learning methods  Outcome: Progressing     Problem: Prexisting or High Potential for Compromised Skin Integrity  Goal: Skin integrity is maintained or improved  Description: INTERVENTIONS:  - Identify patients at risk for skin breakdown  - Assess and monitor skin integrity  - Assess and monitor nutrition and hydration status  - Monitor labs   - Assess for incontinence   - Turn and reposition patient  - Assist with mobility/ambulation  - Relieve pressure over bony prominences  - Avoid friction and shearing  - Provide appropriate hygiene as needed including keeping skin clean and dry  - Evaluate need for skin moisturizer/barrier cream  - Collaborate with interdisciplinary team   - Patient/family teaching  - Consider wound care consult   Outcome: Progressing     Problem: Nutrition/Hydration-ADULT  Goal: Nutrient/Hydration intake appropriate for improving, restoring or maintaining nutritional needs  Description: Monitor and assess patient's nutrition/hydration status for malnutrition  Collaborate with interdisciplinary team and initiate plan and interventions as ordered  Monitor patient's weight and dietary intake as ordered or per policy  Utilize nutrition screening tool and intervene as necessary  Determine patient's food preferences and provide high-protein, high-caloric foods as appropriate       INTERVENTIONS:  - Monitor oral intake, urinary output, labs, and treatment plans  - Assess nutrition and hydration status and recommend course of action  - Evaluate amount of meals eaten  - Assist patient with eating if necessary   - Allow adequate time for meals  - Recommend/ encourage appropriate diets, oral nutritional supplements, and vitamin/mineral supplements  - Order, calculate, and assess calorie counts as needed  - Recommend, monitor, and adjust tube feedings and TPN/PPN based on assessed needs  - Assess need for intravenous fluids  - Provide specific nutrition/hydration education as appropriate  - Include patient/family/caregiver in decisions related to nutrition  Outcome: Progressing

## 2022-02-24 NOTE — ASSESSMENT & PLAN NOTE
Patient admitted with bilateral COVID pneumonia requiring oxygen via nasal cannula     Patient requires oxygen via mid flow at 5-8 L  She is in no respiratory distress currently  Her p o   Intake is poor but she likes chocolate Ensure    Continue baricitinib, dexamethasone, remdesivir    I discussed case with patient's granddaughter phone on February 24th in detail

## 2022-02-24 NOTE — ASSESSMENT & PLAN NOTE
Patient's history of lymphoma  Patient has increase in her leukocyte count today likely due to steroids  She has anemia without signs of GI  bleeding        Will monitor blood counts prematurity

## 2022-02-24 NOTE — PROGRESS NOTES
[place]  Progress Note - Andriy Salazar 1934, 80 y o  female MRN: [id number]  Unit/Bed#: -[facility] Encounter: [id number]  Primary Care Provider: Suhas Guerra DO   Date and time admitted to hospital: 2/20/2022 11:41 AM    * Pneumonia due to COVID-19 virus  Assessment & Plan  Patient admitted with bilateral COVID pneumonia requiring oxygen via nasal cannula     Patient requires oxygen via mid flow at 5-8 L  She is in no respiratory distress currently  Her p o  Intake is poor but she likes chocolate Ensure    Continue baricitinib, dexamethasone, remdesivir    I discussed case with patient's granddaughter phone on February 24th in detail    Acute on chronic respiratory failure with hypoxia [facility])  Assessment & Plan  Patient had acute on chronic hypoxic respiratory failure due to COVID pneumonia as evidenced by respiratory more than 25 and hypoxia requiring treatment with oxygen via mid flow at 10 liters/minute  She uses 2 liters/minute at home    COPD (chronic obstructive pulmonary disease) [facility])  Assessment & Plan  Patient has chronic COPD  She had no COPD exacerbation to the hospital stay  Severe protein-calorie malnutrition ([facility] [address] )  Assessment & Plan  Malnutrition Findings:   Adult Malnutrition type: Chronic illness  Adult Degree of Malnutrition: Other severe protein calorie malnutrition (Pt presents with severe protein calorie malnutrition as evidenced by sunken orbitals, prominent clavicel bone and temporal scooping  Treat with Regular diet and Ensure QID )    BMI Findings: Body mass index is 19 59 kg/m²  · Dietary supplemenets     Patient has severe protein calorie malnutrition  She has diffuse atrophy of the muscles of the extremities and temporal atrophy  Continue chocolate Ensure nutritional supplements    Lymphoma ([facility] [address] )  Assessment & Plan  Patient's history of lymphoma      Patient has increase in her leukocyte count today likely due to steroids  She has anemia without signs of GI  bleeding  Will monitor blood counts    Pressure injury of sacral region, stage 2 [facility])  Assessment & Plan  Patient has stage II sacral pressure ulcer present on admission without signs of cellulitis  Continue local care, frequent repositioning  VTE Prophylaxis: in place    Patient Centered Rounds: I rounded with patient's nurse    Current Length of Stay: 4 day(s)    Current Patient Status: Inpatient    Certification Statement: Pt requires additional inpatient hospital stay due to: see assessment and plan        Subjective:   Patient's nurse reports that patient has poor p o  Intake  Patient denies chest pain, shortness but no nausea abdominal pain  Patient has had no signs of bleeding    All other ROS are negative    Objective:     Vitals:   Temp (24hrs), Av °F (36 7 °C), Min:97 °F (36 1 °C), Max:98 7 °F (37 1 °C)    Temp:  [97 °F (36 1 °C)-98 7 °F (37 1 °C)] 97 °F (36 1 °C)  HR:  [83-99] 89  Resp:  [18-22] 18  BP: (107-143)/(53-70) 131/70  SpO2:  [89 %-97 %] 97 %  Body mass index is 19 59 kg/m²  Input and Output Summary (last 24 hours):     No intake or output data in the 24 hours ending 22 1114    Physical Exam:     Physical Exam  Constitutional:       General: She is not in acute distress  Eyes:      Conjunctiva/sclera: Conjunctivae normal    Cardiovascular:      Rate and Rhythm: Normal rate and regular rhythm  Pulmonary:      Effort: No respiratory distress  Breath sounds: Rales (Scant crackles are heard posteriorly) present  No wheezing  Abdominal:      General: Bowel sounds are normal       Palpations: Abdomen is soft  Tenderness: There is no abdominal tenderness  Skin:     General: Skin is warm and dry  Comments: Patient no lower extremity edema   Neurological:      Mental Status: She is alert  Mental status is at baseline  Motor: No weakness ( she moves all extremities on command, speech was normal)  I personally reviewed labs and imaging reports for today  Last 24 Hours Medication List:   Current Facility-Administered Medications   Medication Dose Route Frequency Provider Last Rate    acetaminophen  650 mg Oral Q6H PRN Donal VELASCO PA-C      atorvastatin  20 mg Oral Daily With [employer] PATI VELASCO      Baricitinib  4 mg Oral Q24H King Arthur MD      cefTRIAXone  1,000 mg Intravenous Q24H Jo-Ann VELASCO PA-C 1,000 mg (02/23/22 2155)    dexamethasone  6 mg Intravenous Q24H Jo-Ann VELASCO PA-C      doxycycline hyclate  100 mg Oral Q12H [address] King Arthur MD      ferrous sulfate  325 mg Oral Daily With Breakfast Donal VELASCO PA-C      heparin (porcine)  3-20 Units/kg/hr (Order-Specific) Intravenous Titrated Livia Mondragon MD 14 Units/kg/hr (02/24/22 0720)    heparin (porcine)  1,200 Units Intravenous Q1H PRN Livia Mondragon MD      heparin (porcine)  2,400 Units Intravenous Q1H PRN Livia Mondragon MD      levothyroxine  25 mcg Oral Early Morning Donal Michael VELASCO PA-C      mirtazapine  15 mg Oral HS Jo-Ann VELASCO PA-C      remdesivir  100 mg Intravenous Q24H Jo-Ann VELASCO PA-C 100 mg (02/21/22 1406)    senna-docusate sodium  1 tablet Oral Daily Jo-Ann VELASCO PA-C      sertraline  25 mg Oral Daily Jo-Ann VELASCO PA-C            Today, Patient Was Seen By: King Arthur MD    ** Please Note: Dictation voice to text software may have been used in the creation of this document   **

## 2022-02-24 NOTE — PROGRESS NOTES
Progress Note - Palliative & Supportive Care  Mejia Hutson  80 y o   female  /-01   MRN: [id number]  Encounter: [id number]     Assessment/Problems Actively Addressed this Visit:  Goals of care counseling  Severe protein calorie malnutrition  Lymphoma  Acute on chronic respiratory failure with hypoxia  COVID-19 pneumonia  COPD  Stage II pressure injury     Goals of Care  Level 3 code status  Family meeting today  with the following participants:  -Shaq Montanez: [phone] (daughter)  -Mason Lazar: [phone] (daughter)  -Laureano Renee: [phone] (niece)  See recommendations below  [facility] will follow         Record of Family Meeting - Palliative and Supportive Care   Mejia Hutson 80 y o  female [id number]      Recommendations and Plan:  · Transition to level 3 DNR/DNI  · Once medically stabilized, family would like to transition [place] home with VNA  · Family interested in repeat COVID test prior to discharge home  · Family would consider transitioning [place] to hospice in the future at home but not immediately  Number given for hospice VNA  · [facility] will continue to follow with family if [place] cannot be weaned from oxygen for discharge home  · Family will bring in Living Will this weekend  A family meeting was held for goals of care  This meeting was necessary for determine the appropriate course of treatment  Time of Meetin:00p  Meeting Location:  Conference call   Participants: Steve Dietz PA-C, Lara Holm (daughter), Mason Lazra (daughter), Laureano Renee (niece)     Patient Participation:  [place] is unable to participate due to dementia    Advanced Directive of POLST available:  Family has a a copy of Parvin's living will at home which they will bring in this weekend  Topics of Discussion:  We discussed glorious prognosis including long-term, chronic conditions and short-term acute insults  We discussed the nature of hospice cares    We discussed the location of hospice cares  We discussed hospice care at home versus full care home with VNA  We discussed code status  We discussed living will      Time Involved in Meetinmin, beginning at approximately  12:15 and ending at approximately 12:55  Marlin Carrasco PA-C   Palliative and Supportive Care  Clinic/Answering Service: [phone]  You can find me on [employer]! Decisional apparatus:  Patient is not competent on exam today  If competence is lost, patient's substitute decision maker would default to  3 living children*  by [place] Act 169  [address] has deferred his decision-making to his 2 sisters  Advance Directive/Living Will:  family will bring in this weekend  POLST:   POA Forms:   24 History  Chart reviewed before visit  Valentino Shavers has been able to gradually wean from mid flow oxygen and is still requiring 5 L nasal cannula      Review of Systems:   Review of Systems   Unable to perform ROS: dementia       Medications    Current Facility-Administered Medications:     acetaminophen (TYLENOL) tablet 650 mg, 650 mg, Oral, Q6H PRN, Jo-Ann VELASCO PA-C, 650 mg at 22 0341    atorvastatin (LIPITOR) tablet 20 mg, 20 mg, Oral, Daily With Dinner, Jo-Ann VELASCO PA-C, 20 mg at 22 1613    Baricitinib (OLUMIANT) (COVID EUA) tablet 4 mg, 4 mg, Oral, Q24H, Dimple Montemayor MD, 4 mg at 22 1613    cefTRIAXone (ROCEPHIN) IVPB (premix in dextrose) 1,000 mg 50 mL, 1,000 mg, Intravenous, Q24H, Jo-Ann VELASCO PA-C, Last Rate: 100 mL/hr at 22 2155, 1,000 mg at 22 2155    dexamethasone (DECADRON) injection 6 mg, 6 mg, Intravenous, Q24H, Jo-Ann VELASCO PA-C, 6 mg at 22 1443    doxycycline hyclate (VIBRAMYCIN) capsule 100 mg, 100 mg, Oral, Q12H [facility], Dimple Montemayor MD, 100 mg at 22 1005    ferrous sulfate tablet 325 mg, 325 mg, Oral, Daily With Breakfast, Jo-Ann VELASCO PA-C, 325 mg at 22 1005    heparin (porcine) 25,000 units in 0 45% NaCl 250 mL infusion (premix), 3-20 Units/kg/hr (Order-Specific), Intravenous, Titrated, Razia Robin MD, Last Rate: 5 6 mL/hr at 02/24/22 0720, 14 Units/kg/hr at 02/24/22 0720    heparin (porcine) injection 1,200 Units, 1,200 Units, Intravenous, Q1H PRN, Razia Robin MD, 1,200 Units at 02/22/22 7863    heparin (porcine) injection 2,400 Units, 2,400 Units, Intravenous, Q1H PRN, Razia Robin MD, 2,400 Units at 02/22/22 1657    levothyroxine tablet 25 mcg, 25 mcg, Oral, Early Morning, Jo-Ann Yañez V PA-C, 25 mcg at 02/24/22 0557    mirtazapine (REMERON) tablet 15 mg, 15 mg, Oral, HS, Jo-Ann Yañez V PA-C, 15 mg at 02/23/22 2154    [COMPLETED] remdesivir (Veklury) 200 mg in sodium chloride 0 9 % 290 mL IVPB, 200 mg, Intravenous, Q24H, 200 mg at 02/20/22 1439 **FOLLOWED BY** remdesivir (Veklury) 100 mg in sodium chloride 0 9 % 270 mL IVPB, 100 mg, Intravenous, Q24H, Jo-Ann Yañez V PA-C, Last Rate: 500 mL/hr at 02/21/22 1406, 100 mg at 02/23/22 1446    senna-docusate sodium (SENOKOT S) 8 6-50 mg per tablet 1 tablet, 1 tablet, Oral, Daily, Jo-Ann Yañez V PA-C, 1 tablet at 02/24/22 1005    sertraline (ZOLOFT) tablet 25 mg, 25 mg, Oral, Daily, Jo-Ann Yañez V PA-C, 25 mg at 02/24/22 1005    Objective  /70 (BP Location: Right arm)   Pulse 89   Temp (!) 97 °F (36 1 °C) (Oral)   Resp 18   Ht 4' 11" (1 499 m)   Wt 44 kg (97 lb)   SpO2 97%   BMI 19 59 kg/m²     Physical Exam:   Physical Exam  Vitals and nursing note reviewed  Exam conducted with a chaperone present  Constitutional:       General: She is not in acute distress  Appearance: She is well-developed  She is ill-appearing  HENT:      Head: Normocephalic and atraumatic  Eyes:      Conjunctiva/sclera: Conjunctivae normal    Pulmonary:      Effort: Pulmonary effort is normal  No respiratory distress  Musculoskeletal:      Cervical back: Neck supple  Skin:     General: Skin is warm and dry        Coloration: Skin is pale  Neurological:      Mental Status: She is alert  She is disoriented  Lab Results:   I have personally reviewed pertinent labs  , CBC:   Lab Results   Component Value Date    WBC 20 88 (H) 02/24/2022    HGB 7 1 (L) 02/24/2022    HCT 22 9 (L) 02/24/2022    MCV 80 (L) 02/24/2022     (H) 02/24/2022    MCH 24 8 (L) 02/24/2022    MCHC 31 0 (L) 02/24/2022    RDW 16 4 (H) 02/24/2022    MPV 9 6 02/24/2022   , CMP:   Lab Results   Component Value Date    SODIUM 145 02/24/2022    K 3 9 02/24/2022     (H) 02/24/2022    CO2 29 02/24/2022    BUN 23 02/24/2022    CREATININE 0 44 (L) 02/24/2022    CALCIUM 7 7 (L) 02/24/2022    AST 59 (H) 02/24/2022    ALT 65 02/24/2022    ALKPHOS 125 (H) 02/24/2022    EGFR 91 02/24/2022     Imaging Studies: I have personally reviewed pertinent reports  EKG, Pathology, and Other Studies: I have personally reviewed pertinent reports  Counseling / Coordination of Care  Total floor / unit time spent today 70 minutes  Greater than 50% of total time was spent with the patient and / or family counseling and / or coordinating of care  A description of the counseling / coordination of care: Chart reviewed,  provided medical updates, determined goals of care, discussed palliative care and symptom management, discussed code status, discussed comfort care and hospice, determined competency and POA/HCA, determined social/family support, provided psychosocial support  Reviewed with TRAVIS team, RN and CM  [facility]  Palliative & Supportive Care    Portions of this document may have been created using dictation software and as such some "sound alike" terms may have been generated by the system  Do not hesitate to contact me with any questions or clarifications

## 2022-02-25 NOTE — PROGRESS NOTES
Progress Note - Palliative & Supportive Care  Bernard Jones  80 y o   female  /-01   MRN: [id number]  Encounter: [id number]     Assessment/Problems Actively Addressed this Visit:  Goals of care counseling  Severe protein calorie malnutrition  Lymphoma  Acute on chronic respiratory failure with hypoxia  COVID-19 pneumonia  COPD  Stage II pressure injury    Goals of Care  · Level 3 code status  · Family discussion held 2/24  · At discharge, plan for transition to home  Family already has DME equipment  · Daughters would like to defer hospice conversations until Victoriano Bueno is at home  Given number for  [facility]  Plan  Symptom Management  Will defer symptom management to the primary team at this time  24 History  Chart reviewed before visit  Victoriano Bueno is stable on her oxygen needs  Decisional apparatus:  Patient is not competent on exam today  If competence is lost, patient's substitute decision maker would default to *3 living children  by [place]  *Son Homer Thomas has deferred his decision-making to his 2 sisters    Advance Directive/Living Will:  family will bring in this weekend  POLST:   POA Forms:     Review of Systems:   Review of Systems   Unable to perform ROS: dementia     Medications    Current Facility-Administered Medications:     acetaminophen (TYLENOL) tablet 650 mg, 650 mg, Oral, Q6H PRN, Jo-Ann Yañez V PA-C, 650 mg at 02/21/22 0341    atorvastatin (LIPITOR) tablet 20 mg, 20 mg, Oral, Daily With Dinner, Jo-Ann Yañez V PA-C, 20 mg at 02/24/22 1728    Baricitinib (OLUMIANT) (COVID EUA) tablet 4 mg, 4 mg, Oral, Q24H, Patria Blanco MD, 4 mg at 02/24/22 1728    cefTRIAXone (ROCEPHIN) IVPB (premix in dextrose) 1,000 mg 50 mL, 1,000 mg, Intravenous, Q24H, Jo-Ann Yañez V PA-C, Last Rate: 100 mL/hr at 02/24/22 2030, 1,000 mg at 02/24/22 2030    dexamethasone (DECADRON) injection 6 mg, 6 mg, Intravenous, Q24H, Jo-Ann Yañez V PA-C, 6 mg at 02/24/22 1424    doxycycline hyclate (VIBRAMYCIN) capsule 100 mg, 100 mg, Oral, Q12H [address], Gary Kerr MD, 100 mg at 02/25/22 1102    ferrous sulfate tablet 325 mg, 325 mg, Oral, Daily With Breakfast, Jo-Ann VELASCO PA-C, 325 mg at 02/25/22 6670    heparin (porcine) 25,000 units in 0 45% NaCl 250 mL infusion (premix), 3-20 Units/kg/hr (Order-Specific), Intravenous, Titrated, Yesenia Garcia MD, Last Rate: 5 6 mL/hr at 02/25/22 0108, 14 Units/kg/hr at 02/25/22 0108    heparin (porcine) injection 1,200 Units, 1,200 Units, Intravenous, Q1H PRN, Yesenia Garcia MD, 1,200 Units at 02/24/22 1731    heparin (porcine) injection 2,400 Units, 2,400 Units, Intravenous, Q1H PRN, Yesenia Garcia MD, 2,400 Units at 02/22/22 1657    levothyroxine tablet 25 mcg, 25 mcg, Oral, Early Morning, RAÚL Barnes-C, 25 mcg at 02/25/22 0612    mirtazapine (REMERON) tablet 15 mg, 15 mg, Oral, HS, RAÚL Barnes-C, 15 mg at 02/24/22 2147    senna-docusate sodium (SENOKOT S) 8 6-50 mg per tablet 1 tablet, 1 tablet, Oral, Daily, Jo-Ann VELASCO PA-C, 1 tablet at 02/25/22 5135    sertraline (ZOLOFT) tablet 25 mg, 25 mg, Oral, Daily, Jo-Ann Yañez V PA-C, 25 mg at 02/25/22 8824    Objective  /59   Pulse 83   Temp (!) 97 2 °F (36 2 °C)   Resp 20   Ht 4' 11" (1 499 m)   Wt 44 kg (97 lb)   SpO2 97%   BMI 19 59 kg/m²     Physical Exam:   Physical Exam  Vitals and nursing note reviewed  Constitutional:       General: She is sleeping  She is not in acute distress  Appearance: She is cachectic  She is ill-appearing  Interventions: Nasal cannula in place  HENT:      Head: Normocephalic and atraumatic  Cardiovascular:      Rate and Rhythm: Normal rate and regular rhythm  Pulmonary:      Effort: Pulmonary effort is normal  No respiratory distress  Musculoskeletal:      Cervical back: Neck supple  Skin:     General: Skin is warm and dry  Coloration: Skin is pale         Lab Results:   CBC:   Lab Results   Component Value Date    WBC 20 31 (H) 02/25/2022    HGB 7 2 (L) 02/25/2022    HCT 22 9 (L) 02/25/2022    MCV 80 (L) 02/25/2022     (H) 02/25/2022    MCH 25 1 (L) 02/25/2022    MCHC 31 4 02/25/2022    RDW 16 3 (H) 02/25/2022    MPV 9 7 02/25/2022   , BMP:  Lab Results   Component Value Date    SODIUM 138 02/25/2022    K 4 3 02/25/2022     02/25/2022    CO2 30 02/25/2022    BUN 20 02/25/2022    CREATININE 0 42 (L) 02/25/2022    GLUC 90 02/25/2022    CALCIUM 7 9 (L) 02/25/2022    AGAP 2 (L) 02/25/2022    EGFR 92 02/25/2022     Imaging Studies: I have personally reviewed pertinent reports  EKG, Pathology, and Other Studies: I have personally reviewed pertinent reports  Counseling / Coordination of Care  Total floor / unit time spent today 25 minutes  Greater than 50% of total time was spent with the patient and / or family counseling and / or coordinating of care  A description of the counseling / coordination of care: Chart reviewed, coordinated communication, determined competency and POA/HCA, determined social/family support, provided psychosocial support  Reviewed with [facility] team, RN and CM  MAGDALENA Keller  Palliative & Supportive Care    Portions of this document may have been created using dictation software and as such some "sound alike" terms may have been generated by the system  Do not hesitate to contact me with any questions or clarifications

## 2022-02-25 NOTE — ASSESSMENT & PLAN NOTE
Patient admitted with bilateral COVID pneumonia requiring oxygen via nasal cannula     Patient requires oxygen via nasal cannula at 4 liters/minute today, she is improving    She continues to have very poor p o  Intake but she likes chocolate Ensure    Continue baricitinib, dexamethasone, remdesivir    I discussed case with patient's granddaughter phone on February 25th in detail and made her aware that there is no indication to repeat COVID testing before discharge    Patient has oxygen at home that she uses at night not during the day typically    She was able to walk 75 feet with assistance before she got sick with COVID-19

## 2022-02-25 NOTE — PROGRESS NOTES
[place]  Progress Note - Wing Metcalf 1934, 80 y o  female MRN: [id number]  Unit/Bed#: -[facility] Encounter: [id number]  Primary Care Provider: Becca Wade DO   Date and time admitted to hospital: 2/20/2022 11:41 AM    * Pneumonia due to COVID-19 virus  Assessment & Plan  Patient admitted with bilateral COVID pneumonia requiring oxygen via nasal cannula     Patient requires oxygen via nasal cannula at 4 liters/minute today, she is improving    She continues to have very poor p o  Intake but she likes chocolate Ensure    Continue baricitinib, dexamethasone, remdesivir    I discussed case with patient's granddaughter phone on February 25th in detail and made her aware that there is no indication to repeat COVID testing before discharge    Patient has oxygen at home that she uses at night not during the day typically    She was able to walk 75 feet with assistance before she got sick with COVID-19  Acute on chronic respiratory failure with hypoxia [facility])  Assessment & Plan  Patient had acute on chronic hypoxic respiratory failure due to COVID pneumonia as evidenced by respiratory more than 25 and hypoxia requiring treatment with oxygen via mid flow at 10 liters/minute  She uses 2 liters/minute at home    Sepsis [facility])  Assessment & Plan  Patient met criteria for sepsis admission as evidenced by respiratory more than 20, heart rate more than 100, leukocytosis more than 10K due to COVID pneumonia  Blood cultures showed no growth so far    COPD (chronic obstructive pulmonary disease) [facility])  Assessment & Plan  Patient has chronic COPD  She had no COPD exacerbation to the hospital stay            VTE Prophylaxis: in place    Patient Centered Rounds: I rounded with patient's nurse    Current Length of Stay: 5 day(s)    Current Patient Status: Inpatient    Certification Statement: Pt requires additional inpatient hospital stay due to: see assessment and plan        Subjective:   Patient's nurse reported that patient had poor p o  Intake, she had no vomiting, diarrhea, signs of bleeding  Were able to decrease oxygen to 4 liters/minute  Patient denies chest pain, abdominal pain, dysuria  She still has dry cough  All other ROS are negative    Objective:     Vitals:   Temp (24hrs), Av 9 °F (36 1 °C), Min:96 °F (35 6 °C), Max:97 4 °F (36 3 °C)    Temp:  [96 °F (35 6 °C)-97 4 °F (36 3 °C)] 97 4 °F (36 3 °C)  HR:  [76-83] 83  Resp:  [18-22] 22  BP: (128-133)/(58-61) 128/61  SpO2:  [94 %-99 %] 94 %  Body mass index is 19 59 kg/m²  Input and Output Summary (last 24 hours): Intake/Output Summary (Last 24 hours) at 2022 1734  Last data filed at 2022 1401  Gross per 24 hour   Intake --   Output 248 ml   Net -248 ml       Physical Exam:     Physical Exam  HENT:      Head: Normocephalic  Neck:      Comments: Patient has no JVD  Pulmonary:      Effort: No respiratory distress  Breath sounds: Rales (Scant crackles are heard posteriorly) present  No wheezing  Abdominal:      General: Bowel sounds are normal       Palpations: Abdomen is soft  Tenderness: There is no abdominal tenderness  Musculoskeletal:      Cervical back: Neck supple  Skin:     General: Skin is warm and dry  Neurological:      Mental Status: She is alert  Mental status is at baseline  Motor: No weakness ( moves all extremities on command)  I personally reviewed labs and imaging reports for today        Last 24 Hours Medication List:   Current Facility-Administered Medications   Medication Dose Route Frequency Provider Last Rate    acetaminophen  650 mg Oral Q6H PRN Sandy Cotto PA-C      atorvastatin  20 mg Oral Daily With [employer] VPATI      Baricitinib  4 mg Oral Q24H Nayeli Eaton MD      dexamethasone  6 mg Intravenous Q24H [address] PATI VELASCO      ferrous sulfate  325 mg Oral Daily With Breakfast  [address] PATI VELASCO  heparin (porcine)  3-20 Units/kg/hr (Order-Specific) Intravenous Titrated Philippe Shepherd MD 14 Units/kg/hr (02/25/22 1732)    heparin (porcine)  1,200 Units Intravenous Q1H PRN Philippe Shepherd MD      heparin (porcine)  2,400 Units Intravenous Q1H PRN Philippe Shepherd MD      levothyroxine  25 mcg Oral Early Morning [facility] VPATI      mirtazapine  15 mg Oral HS Jo-Ann VELASCO PA-C      senna-docusate sodium  1 tablet Oral Daily Jo-Ann VELASCO PA-C      sertraline  25 mg Oral Daily Jo-Ann VELASCO PA-C            Today, Patient Was Seen By: Renea Ordaz MD    ** Please Note: Dictation voice to text software may have been used in the creation of this document   **

## 2022-02-25 NOTE — PLAN OF CARE
Problem: MOBILITY - ADULT  Goal: Maintain or return to baseline ADL function  Description: INTERVENTIONS:  -  Assess patient's ability to carry out ADLs; assess patient's baseline for ADL function and identify physical deficits which impact ability to perform ADLs (bathing, care of mouth/teeth, toileting, grooming, dressing, etc )  - Assess/evaluate cause of self-care deficits   - Assess range of motion  - Assess patient's mobility; develop plan if impaired  - Assess patient's need for assistive devices and provide as appropriate  - Encourage maximum independence but intervene and supervise when necessary  - Involve family in performance of ADLs  - Assess for home care needs following discharge   - Consider OT consult to assist with ADL evaluation and planning for discharge  - Provide patient education as appropriate  Outcome: Progressing  Goal: Maintains/Returns to pre admission functional level  Description: INTERVENTIONS:  - Perform BMAT or MOVE assessment daily    - Set and communicate daily mobility goal to care team and patient/family/caregiver  - Collaborate with rehabilitation services on mobility goals if consulted  - Perform Range of Motion 2 times a day  - Reposition patient every 2 hours    - Dangle patient 2 times a day  - Stand patient 2 times a day  - Ambulate patient 2 times a day  - Out of bed to chair 2 times a day   - Out of bed for meals 2 times a day  - Out of bed for toileting  - Record patient progress and toleration of activity level   Outcome: Progressing     Problem: Potential for Falls  Goal: Patient will remain free of falls  Description: INTERVENTIONS:  - Educate patient/family on patient safety including physical limitations  - Instruct patient to call for assistance with activity   - Consult OT/PT to assist with strengthening/mobility   - Keep Call bell within reach  - Keep bed low and locked with side rails adjusted as appropriate  - Keep care items and personal belongings within reach  - Initiate and maintain comfort rounds  - Make Fall Risk Sign visible to staff  - Offer Toileting every 2 Hours, in advance of need  - Initiate/Maintain bed alarm  - Obtain necessary fall risk management equipment: socks  - Apply yellow socks and bracelet for high fall risk patients  - Consider moving patient to room near nurses station  Outcome: Progressing     Problem: INFECTION - ADULT  Goal: Absence or prevention of progression during hospitalization  Description: INTERVENTIONS:  - Assess and monitor for signs and symptoms of infection  - Monitor lab/diagnostic results  - Monitor all insertion sites, i e  indwelling lines, tubes, and drains  - Monitor endotracheal if appropriate and nasal secretions for changes in amount and color  - [place] appropriate cooling/warming therapies per order  - Administer medications as ordered  - Instruct and encourage patient and family to use good hand hygiene technique  - Identify and instruct in appropriate isolation precautions for identified infection/condition  Outcome: Progressing  Goal: Absence of fever/infection during neutropenic period  Description: INTERVENTIONS:  - Monitor WBC    Outcome: Progressing     Problem: SAFETY ADULT  Goal: Maintain or return to baseline ADL function  Description: INTERVENTIONS:  -  Assess patient's ability to carry out ADLs; assess patient's baseline for ADL function and identify physical deficits which impact ability to perform ADLs (bathing, care of mouth/teeth, toileting, grooming, dressing, etc )  - Assess/evaluate cause of self-care deficits   - Assess range of motion  - Assess patient's mobility; develop plan if impaired  - Assess patient's need for assistive devices and provide as appropriate  - Encourage maximum independence but intervene and supervise when necessary  - Involve family in performance of ADLs  - Assess for home care needs following discharge   - Consider OT consult to assist with ADL evaluation and planning for discharge  - Provide patient education as appropriate  Outcome: Progressing  Goal: Maintains/Returns to pre admission functional level  Description: INTERVENTIONS:  - Perform BMAT or MOVE assessment daily    - Set and communicate daily mobility goal to care team and patient/family/caregiver  - Collaborate with rehabilitation services on mobility goals if consulted  - Perform Range of Motion 2 times a day  - Reposition patient every 2 hours    - Dangle patient 2 times a day  - Stand patient 2 times a day  - Ambulate patient 2 times a day  - Out of bed to chair 2 times a day   - Out of bed for meals 2 times a day  - Out of bed for toileting  - Record patient progress and toleration of activity level   Outcome: Progressing  Goal: Patient will remain free of falls  Description: INTERVENTIONS:  - Educate patient/family on patient safety including physical limitations  - Instruct patient to call for assistance with activity   - Consult OT/PT to assist with strengthening/mobility   - Keep Call bell within reach  - Keep bed low and locked with side rails adjusted as appropriate  - Keep care items and personal belongings within reach  - Initiate and maintain comfort rounds  - Make Fall Risk Sign visible to staff  - Offer Toileting every 2 Hours, in advance of need  - Initiate/Maintain bed alarm  - Obtain necessary fall risk management equipment: socks  - Apply yellow socks and bracelet for high fall risk patients  - Consider moving patient to room near nurses station  Outcome: Progressing     Problem: DISCHARGE PLANNING  Goal: Discharge to home or other facility with appropriate resources  Description: INTERVENTIONS:  - Identify barriers to discharge w/patient and caregiver  - Arrange for needed discharge resources and transportation as appropriate  - Identify discharge learning needs (meds, wound care, etc )  - Arrange for interpretive services to assist at discharge as needed  - Refer to Case Management Department for coordinating discharge planning if the patient needs post-hospital services based on physician/advanced practitioner order or complex needs related to functional status, cognitive ability, or social support system  Outcome: Progressing     Problem: Knowledge Deficit  Goal: Patient/family/caregiver demonstrates understanding of disease process, treatment plan, medications, and discharge instructions  Description: Complete learning assessment and assess knowledge base  Interventions:  - Provide teaching at level of understanding  - Provide teaching via preferred learning methods  Outcome: Progressing     Problem: Prexisting or High Potential for Compromised Skin Integrity  Goal: Skin integrity is maintained or improved  Description: INTERVENTIONS:  - Identify patients at risk for skin breakdown  - Assess and monitor skin integrity  - Assess and monitor nutrition and hydration status  - Monitor labs   - Assess for incontinence   - Turn and reposition patient  - Assist with mobility/ambulation  - Relieve pressure over bony prominences  - Avoid friction and shearing  - Provide appropriate hygiene as needed including keeping skin clean and dry  - Evaluate need for skin moisturizer/barrier cream  - Collaborate with interdisciplinary team   - Patient/family teaching  - Consider wound care consult   Outcome: Progressing     Problem: Nutrition/Hydration-ADULT  Goal: Nutrient/Hydration intake appropriate for improving, restoring or maintaining nutritional needs  Description: Monitor and assess patient's nutrition/hydration status for malnutrition  Collaborate with interdisciplinary team and initiate plan and interventions as ordered  Monitor patient's weight and dietary intake as ordered or per policy  Utilize nutrition screening tool and intervene as necessary  Determine patient's food preferences and provide high-protein, high-caloric foods as appropriate       INTERVENTIONS:  - Monitor oral intake, urinary output, labs, and treatment plans  - Assess nutrition and hydration status and recommend course of action  - Evaluate amount of meals eaten  - Assist patient with eating if necessary   - Allow adequate time for meals  - Recommend/ encourage appropriate diets, oral nutritional supplements, and vitamin/mineral supplements  - Order, calculate, and assess calorie counts as needed  - Recommend, monitor, and adjust tube feedings and TPN/PPN based on assessed needs  - Assess need for intravenous fluids  - Provide specific nutrition/hydration education as appropriate  - Include patient/family/caregiver in decisions related to nutrition  Outcome: Progressing

## 2022-02-25 NOTE — PLAN OF CARE
Problem: MOBILITY - ADULT  Goal: Maintain or return to baseline ADL function  Description: INTERVENTIONS:  -  Assess patient's ability to carry out ADLs; assess patient's baseline for ADL function and identify physical deficits which impact ability to perform ADLs (bathing, care of mouth/teeth, toileting, grooming, dressing, etc )  - Assess/evaluate cause of self-care deficits   - Assess range of motion  - Assess patient's mobility; develop plan if impaired  - Assess patient's need for assistive devices and provide as appropriate  - Encourage maximum independence but intervene and supervise when necessary  - Involve family in performance of ADLs  - Assess for home care needs following discharge   - Consider OT consult to assist with ADL evaluation and planning for discharge  - Provide patient education as appropriate  Outcome: Progressing  Goal: Maintains/Returns to pre admission functional level  Description: INTERVENTIONS:  - Perform BMAT or MOVE assessment daily    - Set and communicate daily mobility goal to care team and patient/family/caregiver  - Collaborate with rehabilitation services on mobility goals if consulted  - Perform Range of Motion 2 times a day  - Reposition patient every 2 hours    - Dangle patient 2 times a day  - Stand patient 2 times a day  - Ambulate patient 2 times a day  - Out of bed to chair 2 times a day   - Out of bed for meals 2 times a day  - Out of bed for toileting  - Record patient progress and toleration of activity level   Outcome: Progressing     Problem: Potential for Falls  Goal: Patient will remain free of falls  Description: INTERVENTIONS:  - Educate patient/family on patient safety including physical limitations  - Instruct patient to call for assistance with activity   - Consult OT/PT to assist with strengthening/mobility   - Keep Call bell within reach  - Keep bed low and locked with side rails adjusted as appropriate  - Keep care items and personal belongings within reach  - Initiate and maintain comfort rounds  - Make Fall Risk Sign visible to staff  - Offer Toileting every 2 Hours, in advance of need  - Initiate/Maintain bed alarm  - Obtain necessary fall risk management equipment: socks  - Apply yellow socks and bracelet for high fall risk patients  - Consider moving patient to room near nurses station  Outcome: Progressing     Problem: PAIN - ADULT  Goal: Verbalizes/displays adequate comfort level or baseline comfort level  Description: Interventions:  - Encourage patient to monitor pain and request assistance  - Assess pain using appropriate pain scale  - Administer analgesics based on type and severity of pain and evaluate response  - Implement non-pharmacological measures as appropriate and evaluate response  - Consider cultural and social influences on pain and pain management  - Notify physician/advanced practitioner if interventions unsuccessful or patient reports new pain  Outcome: Progressing     Problem: INFECTION - ADULT  Goal: Absence or prevention of progression during hospitalization  Description: INTERVENTIONS:  - Assess and monitor for signs and symptoms of infection  - Monitor lab/diagnostic results  - Monitor all insertion sites, i e  indwelling lines, tubes, and drains  - Monitor endotracheal if appropriate and nasal secretions for changes in amount and color  - [place] appropriate cooling/warming therapies per order  - Administer medications as ordered  - Instruct and encourage patient and family to use good hand hygiene technique  - Identify and instruct in appropriate isolation precautions for identified infection/condition  Outcome: Progressing  Goal: Absence of fever/infection during neutropenic period  Description: INTERVENTIONS:  - Monitor WBC    Outcome: Progressing     Problem: SAFETY ADULT  Goal: Maintain or return to baseline ADL function  Description: INTERVENTIONS:  -  Assess patient's ability to carry out ADLs; assess patient's baseline for ADL function and identify physical deficits which impact ability to perform ADLs (bathing, care of mouth/teeth, toileting, grooming, dressing, etc )  - Assess/evaluate cause of self-care deficits   - Assess range of motion  - Assess patient's mobility; develop plan if impaired  - Assess patient's need for assistive devices and provide as appropriate  - Encourage maximum independence but intervene and supervise when necessary  - Involve family in performance of ADLs  - Assess for home care needs following discharge   - Consider OT consult to assist with ADL evaluation and planning for discharge  - Provide patient education as appropriate  Outcome: Progressing  Goal: Maintains/Returns to pre admission functional level  Description: INTERVENTIONS:  - Perform BMAT or MOVE assessment daily    - Set and communicate daily mobility goal to care team and patient/family/caregiver  - Collaborate with rehabilitation services on mobility goals if consulted  - Perform Range of Motion 2 times a day  - Reposition patient every 2 hours    - Dangle patient 2 times a day  - Stand patient 2 times a day  - Ambulate patient 2 times a day  - Out of bed to chair 2 times a day   - Out of bed for meals 2 times a day  - Out of bed for toileting  - Record patient progress and toleration of activity level   Outcome: Progressing  Goal: Patient will remain free of falls  Description: INTERVENTIONS:  - Educate patient/family on patient safety including physical limitations  - Instruct patient to call for assistance with activity   - Consult OT/PT to assist with strengthening/mobility   - Keep Call bell within reach  - Keep bed low and locked with side rails adjusted as appropriate  - Keep care items and personal belongings within reach  - Initiate and maintain comfort rounds  - Make Fall Risk Sign visible to staff  - Offer Toileting every 2 Hours, in advance of need  - Initiate/Maintain bed alarm  - Obtain necessary fall risk management equipment: socks  - Apply yellow socks and bracelet for high fall risk patients  - Consider moving patient to room near nurses station  Outcome: Progressing     Problem: DISCHARGE PLANNING  Goal: Discharge to home or other facility with appropriate resources  Description: INTERVENTIONS:  - Identify barriers to discharge w/patient and caregiver  - Arrange for needed discharge resources and transportation as appropriate  - Identify discharge learning needs (meds, wound care, etc )  - Arrange for interpretive services to assist at discharge as needed  - Refer to Case Management Department for coordinating discharge planning if the patient needs post-hospital services based on physician/advanced practitioner order or complex needs related to functional status, cognitive ability, or social support system  Outcome: Progressing     Problem: Knowledge Deficit  Goal: Patient/family/caregiver demonstrates understanding of disease process, treatment plan, medications, and discharge instructions  Description: Complete learning assessment and assess knowledge base    Interventions:  - Provide teaching at level of understanding  - Provide teaching via preferred learning methods  Outcome: Progressing     Problem: Prexisting or High Potential for Compromised Skin Integrity  Goal: Skin integrity is maintained or improved  Description: INTERVENTIONS:  - Identify patients at risk for skin breakdown  - Assess and monitor skin integrity  - Assess and monitor nutrition and hydration status  - Monitor labs   - Assess for incontinence   - Turn and reposition patient  - Assist with mobility/ambulation  - Relieve pressure over bony prominences  - Avoid friction and shearing  - Provide appropriate hygiene as needed including keeping skin clean and dry  - Evaluate need for skin moisturizer/barrier cream  - Collaborate with interdisciplinary team   - Patient/family teaching  - Consider wound care consult   Outcome: Progressing     Problem: Nutrition/Hydration-ADULT  Goal: Nutrient/Hydration intake appropriate for improving, restoring or maintaining nutritional needs  Description: Monitor and assess patient's nutrition/hydration status for malnutrition  Collaborate with interdisciplinary team and initiate plan and interventions as ordered  Monitor patient's weight and dietary intake as ordered or per policy  Utilize nutrition screening tool and intervene as necessary  Determine patient's food preferences and provide high-protein, high-caloric foods as appropriate       INTERVENTIONS:  - Monitor oral intake, urinary output, labs, and treatment plans  - Assess nutrition and hydration status and recommend course of action  - Evaluate amount of meals eaten  - Assist patient with eating if necessary   - Allow adequate time for meals  - Recommend/ encourage appropriate diets, oral nutritional supplements, and vitamin/mineral supplements  - Order, calculate, and assess calorie counts as needed  - Recommend, monitor, and adjust tube feedings and TPN/PPN based on assessed needs  - Assess need for intravenous fluids  - Provide specific nutrition/hydration education as appropriate  - Include patient/family/caregiver in decisions related to nutrition  Outcome: Progressing

## 2022-02-25 NOTE — PLAN OF CARE
Problem: PAIN - ADULT  Goal: Verbalizes/displays adequate comfort level or baseline comfort level  Description: Interventions:  - Encourage patient to monitor pain and request assistance  - Assess pain using appropriate pain scale  - Administer analgesics based on type and severity of pain and evaluate response  - Implement non-pharmacological measures as appropriate and evaluate response  - Consider cultural and social influences on pain and pain management  - Notify physician/advanced practitioner if interventions unsuccessful or patient reports new pain  Outcome: Progressing     Problem: INFECTION - ADULT  Goal: Absence or prevention of progression during hospitalization  Description: INTERVENTIONS:  - Assess and monitor for signs and symptoms of infection  - Monitor lab/diagnostic results  - Monitor all insertion sites, i e  indwelling lines, tubes, and drains  - Monitor endotracheal if appropriate and nasal secretions for changes in amount and color  - [place] appropriate cooling/warming therapies per order  - Administer medications as ordered  - Instruct and encourage patient and family to use good hand hygiene technique  - Identify and instruct in appropriate isolation precautions for identified infection/condition  Outcome: Progressing     Problem: SAFETY ADULT  Goal: Maintain or return to baseline ADL function  Description: INTERVENTIONS:  -  Assess patient's ability to carry out ADLs; assess patient's baseline for ADL function and identify physical deficits which impact ability to perform ADLs (bathing, care of mouth/teeth, toileting, grooming, dressing, etc )  - Assess/evaluate cause of self-care deficits   - Assess range of motion  - Assess patient's mobility; develop plan if impaired  - Assess patient's need for assistive devices and provide as appropriate  - Encourage maximum independence but intervene and supervise when necessary  - Involve family in performance of ADLs  - Assess for home care needs following discharge   - Consider OT consult to assist with ADL evaluation and planning for discharge  - Provide patient education as appropriate  Outcome: Progressing     Problem: DISCHARGE PLANNING  Goal: Discharge to home or other facility with appropriate resources  Description: INTERVENTIONS:  - Identify barriers to discharge w/patient and caregiver  - Arrange for needed discharge resources and transportation as appropriate  - Identify discharge learning needs (meds, wound care, etc )  - Arrange for interpretive services to assist at discharge as needed  - Refer to Case Management Department for coordinating discharge planning if the patient needs post-hospital services based on physician/advanced practitioner order or complex needs related to functional status, cognitive ability, or social support system  Outcome: Progressing     Problem: Nutrition/Hydration-ADULT  Goal: Nutrient/Hydration intake appropriate for improving, restoring or maintaining nutritional needs  Description: Monitor and assess patient's nutrition/hydration status for malnutrition  Collaborate with interdisciplinary team and initiate plan and interventions as ordered  Monitor patient's weight and dietary intake as ordered or per policy  Utilize nutrition screening tool and intervene as necessary  Determine patient's food preferences and provide high-protein, high-caloric foods as appropriate       INTERVENTIONS:  - Monitor oral intake, urinary output, labs, and treatment plans  - Assess nutrition and hydration status and recommend course of action  - Evaluate amount of meals eaten  - Assist patient with eating if necessary   - Allow adequate time for meals  - Recommend/ encourage appropriate diets, oral nutritional supplements, and vitamin/mineral supplements  - Order, calculate, and assess calorie counts as needed  - Recommend, monitor, and adjust tube feedings and TPN/PPN based on assessed needs  - Assess need for intravenous fluids  - Provide specific nutrition/hydration education as appropriate  - Include patient/family/caregiver in decisions related to nutrition  Outcome: Progressing

## 2022-02-26 NOTE — ASSESSMENT & PLAN NOTE
Patient admitted with bilateral COVID pneumonia requiring oxygen via nasal cannula     Was able to decrease oxygen to 2 liters/minute and her oxygen saturation remained in the 92-94 % range while I was seeing her in the room  Continue baricitinib, dexamethasone    She completed IV remdesivir  Continue IV heparin for DVT prevention    I offered discharge to patient who declined that  She feels too weak  I discussed case with patient's granddaughter phone on February 26th in detail and made her aware that we have not been able to get patient out of bed due to weakness and she must be very decondition  Patient has oxygen at home that she uses at night  She was able to walk 75 feet with assistance before she got sick with COVID-19

## 2022-02-26 NOTE — PLAN OF CARE
Problem: MOBILITY - ADULT  Goal: Maintain or return to baseline ADL function  Description: INTERVENTIONS:  -  Assess patient's ability to carry out ADLs; assess patient's baseline for ADL function and identify physical deficits which impact ability to perform ADLs (bathing, care of mouth/teeth, toileting, grooming, dressing, etc )  - Assess/evaluate cause of self-care deficits   - Assess range of motion  - Assess patient's mobility; develop plan if impaired  - Assess patient's need for assistive devices and provide as appropriate  - Encourage maximum independence but intervene and supervise when necessary  - Involve family in performance of ADLs  - Assess for home care needs following discharge   - Consider OT consult to assist with ADL evaluation and planning for discharge  - Provide patient education as appropriate  Outcome: Progressing  Goal: Maintains/Returns to pre admission functional level  Description: INTERVENTIONS:  - Perform BMAT or MOVE assessment daily    - Set and communicate daily mobility goal to care team and patient/family/caregiver  - Collaborate with rehabilitation services on mobility goals if consulted  - Perform Range of Motion 2 times a day  - Reposition patient every 2 hours    - Dangle patient 2 times a day  - Stand patient 2 times a day  - Ambulate patient 2 times a day  - Out of bed to chair 2 times a day   - Out of bed for meals 2 times a day  - Out of bed for toileting  - Record patient progress and toleration of activity level   Outcome: Progressing     Problem: Potential for Falls  Goal: Patient will remain free of falls  Description: INTERVENTIONS:  - Educate patient/family on patient safety including physical limitations  - Instruct patient to call for assistance with activity   - Consult OT/PT to assist with strengthening/mobility   - Keep Call bell within reach  - Keep bed low and locked with side rails adjusted as appropriate  - Keep care items and personal belongings within reach  - Initiate and maintain comfort rounds  - Make Fall Risk Sign visible to staff  - Offer Toileting every 2 Hours, in advance of need  - Initiate/Maintain bed alarm  - Obtain necessary fall risk management equipment: socks  - Apply yellow socks and bracelet for high fall risk patients  - Consider moving patient to room near nurses station  Outcome: Progressing     Problem: PAIN - ADULT  Goal: Verbalizes/displays adequate comfort level or baseline comfort level  Description: Interventions:  - Encourage patient to monitor pain and request assistance  - Assess pain using appropriate pain scale  - Administer analgesics based on type and severity of pain and evaluate response  - Implement non-pharmacological measures as appropriate and evaluate response  - Consider cultural and social influences on pain and pain management  - Notify physician/advanced practitioner if interventions unsuccessful or patient reports new pain  Outcome: Progressing     Problem: INFECTION - ADULT  Goal: Absence or prevention of progression during hospitalization  Description: INTERVENTIONS:  - Assess and monitor for signs and symptoms of infection  - Monitor lab/diagnostic results  - Monitor all insertion sites, i e  indwelling lines, tubes, and drains  - Monitor endotracheal if appropriate and nasal secretions for changes in amount and color  - [place] appropriate cooling/warming therapies per order  - Administer medications as ordered  - Instruct and encourage patient and family to use good hand hygiene technique  - Identify and instruct in appropriate isolation precautions for identified infection/condition  Outcome: Progressing  Goal: Absence of fever/infection during neutropenic period  Description: INTERVENTIONS:  - Monitor WBC    Outcome: Progressing     Problem: SAFETY ADULT  Goal: Maintain or return to baseline ADL function  Description: INTERVENTIONS:  -  Assess patient's ability to carry out ADLs; assess patient's baseline for ADL function and identify physical deficits which impact ability to perform ADLs (bathing, care of mouth/teeth, toileting, grooming, dressing, etc )  - Assess/evaluate cause of self-care deficits   - Assess range of motion  - Assess patient's mobility; develop plan if impaired  - Assess patient's need for assistive devices and provide as appropriate  - Encourage maximum independence but intervene and supervise when necessary  - Involve family in performance of ADLs  - Assess for home care needs following discharge   - Consider OT consult to assist with ADL evaluation and planning for discharge  - Provide patient education as appropriate  Outcome: Progressing  Goal: Maintains/Returns to pre admission functional level  Description: INTERVENTIONS:  - Perform BMAT or MOVE assessment daily    - Set and communicate daily mobility goal to care team and patient/family/caregiver  - Collaborate with rehabilitation services on mobility goals if consulted  - Perform Range of Motion 2 times a day  - Reposition patient every 2 hours    - Dangle patient 2 times a day  - Stand patient 2 times a day  - Ambulate patient 2 times a day  - Out of bed to chair 2 times a day   - Out of bed for meals 2 times a day  - Out of bed for toileting  - Record patient progress and toleration of activity level   Outcome: Progressing  Goal: Patient will remain free of falls  Description: INTERVENTIONS:  - Educate patient/family on patient safety including physical limitations  - Instruct patient to call for assistance with activity   - Consult OT/PT to assist with strengthening/mobility   - Keep Call bell within reach  - Keep bed low and locked with side rails adjusted as appropriate  - Keep care items and personal belongings within reach  - Initiate and maintain comfort rounds  - Make Fall Risk Sign visible to staff  - Offer Toileting every 2 Hours, in advance of need  - Initiate/Maintain bed alarm  - Obtain necessary fall risk management equipment: socks  - Apply yellow socks and bracelet for high fall risk patients  - Consider moving patient to room near nurses station  Outcome: Progressing     Problem: DISCHARGE PLANNING  Goal: Discharge to home or other facility with appropriate resources  Description: INTERVENTIONS:  - Identify barriers to discharge w/patient and caregiver  - Arrange for needed discharge resources and transportation as appropriate  - Identify discharge learning needs (meds, wound care, etc )  - Arrange for interpretive services to assist at discharge as needed  - Refer to Case Management Department for coordinating discharge planning if the patient needs post-hospital services based on physician/advanced practitioner order or complex needs related to functional status, cognitive ability, or social support system  Outcome: Progressing     Problem: Knowledge Deficit  Goal: Patient/family/caregiver demonstrates understanding of disease process, treatment plan, medications, and discharge instructions  Description: Complete learning assessment and assess knowledge base    Interventions:  - Provide teaching at level of understanding  - Provide teaching via preferred learning methods  Outcome: Progressing     Problem: Prexisting or High Potential for Compromised Skin Integrity  Goal: Skin integrity is maintained or improved  Description: INTERVENTIONS:  - Identify patients at risk for skin breakdown  - Assess and monitor skin integrity  - Assess and monitor nutrition and hydration status  - Monitor labs   - Assess for incontinence   - Turn and reposition patient  - Assist with mobility/ambulation  - Relieve pressure over bony prominences  - Avoid friction and shearing  - Provide appropriate hygiene as needed including keeping skin clean and dry  - Evaluate need for skin moisturizer/barrier cream  - Collaborate with interdisciplinary team   - Patient/family teaching  - Consider wound care consult   Outcome: Progressing     Problem: Nutrition/Hydration-ADULT  Goal: Nutrient/Hydration intake appropriate for improving, restoring or maintaining nutritional needs  Description: Monitor and assess patient's nutrition/hydration status for malnutrition  Collaborate with interdisciplinary team and initiate plan and interventions as ordered  Monitor patient's weight and dietary intake as ordered or per policy  Utilize nutrition screening tool and intervene as necessary  Determine patient's food preferences and provide high-protein, high-caloric foods as appropriate       INTERVENTIONS:  - Monitor oral intake, urinary output, labs, and treatment plans  - Assess nutrition and hydration status and recommend course of action  - Evaluate amount of meals eaten  - Assist patient with eating if necessary   - Allow adequate time for meals  - Recommend/ encourage appropriate diets, oral nutritional supplements, and vitamin/mineral supplements  - Order, calculate, and assess calorie counts as needed  - Recommend, monitor, and adjust tube feedings and TPN/PPN based on assessed needs  - Assess need for intravenous fluids  - Provide specific nutrition/hydration education as appropriate  - Include patient/family/caregiver in decisions related to nutrition  Outcome: Progressing

## 2022-02-26 NOTE — PROGRESS NOTES
[place]  Progress Note - Augusta Vann 1934, 80 y o  female MRN: [id number]  Unit/Bed#: -01 Encounter: [id number]  Primary Care Provider: Elle Carbajal DO   Date and time admitted to hospital: 2/20/2022 11:41 AM    * Pneumonia due to COVID-19 virus  Assessment & Plan  Patient admitted with bilateral COVID pneumonia requiring oxygen via nasal cannula     Was able to decrease oxygen to 2 liters/minute and her oxygen saturation remained in the 92-94 % range while I was seeing her in the room  Continue baricitinib, dexamethasone    She completed IV remdesivir  Continue IV heparin for DVT prevention    I offered discharge to patient who declined that  She feels too weak  I discussed case with patient's granddaughter phone on February 26th in detail and made her aware that we have not been able to get patient out of bed due to weakness and she must be very decondition  Patient has oxygen at home that she uses at night  She was able to walk 75 feet with assistance before she got sick with COVID-19  COPD (chronic obstructive pulmonary disease) [facility])  Assessment & Plan  Patient has chronic COPD  She had no COPD exacerbation to the hospital stay  Severe protein-calorie malnutrition ([address] )  Assessment & Plan  Malnutrition Findings:   Adult Malnutrition type: Chronic illness  Adult Degree of Malnutrition: Other severe protein calorie malnutrition (Pt presents with severe protein calorie malnutrition as evidenced by sunken orbitals, prominent clavicel bone and temporal scooping  Treat with Regular diet and Ensure QID )    BMI Findings: Body mass index is 19 59 kg/m²  · Dietary supplemenets     Patient has severe protein calorie malnutrition  She has diffuse atrophy of the muscles of the extremities and temporal atrophy      Continue chocolate Ensure nutritional supplements        VTE Prophylaxis: in place    Patient Centered Rounds: I rounded with patient's nurse    Current Length of Stay: 6 day(s)    Current Patient Status: Inpatient    Certification Statement: Pt requires additional inpatient hospital stay due to: see assessment and plan        Subjective:   Patient denied shortness of breath, chest pain, nausea, abdominal pain  Cough is improving  Patient's nurse reported poor p o  Intake, no signs of GI or  bleeding  All other ROS are negative    Objective:     Vitals:   Temp (24hrs), Av 5 °F (36 4 °C), Min:97 3 °F (36 3 °C), Max:97 8 °F (36 6 °C)    Temp:  [97 3 °F (36 3 °C)-97 8 °F (36 6 °C)] 97 8 °F (36 6 °C)  HR:  [83-91] 91  Resp:  [16-22] 22  BP: (107-128)/(52-61) 123/59  SpO2:  [94 %-99 %] 99 %  Body mass index is 19 59 kg/m²  Input and Output Summary (last 24 hours): Intake/Output Summary (Last 24 hours) at 2022 1034  Last data filed at 2022 1460  Gross per 24 hour   Intake 560 ml   Output 648 ml   Net -88 ml       Physical Exam:     Physical Exam  Constitutional:       General: She is not in acute distress  HENT:      Head: Normocephalic  Cardiovascular:      Rate and Rhythm: Normal rate and regular rhythm  Pulmonary:      Effort: No respiratory distress  Breath sounds: Rales (I heard scant crackles posteriorly) present  No wheezing  Abdominal:      General: Bowel sounds are normal       Palpations: Abdomen is soft  Tenderness: There is no abdominal tenderness  Skin:     General: Skin is warm and dry  Comments: Patient no lower extremity edema   Neurological:      Mental Status: She is alert  Mental status is at baseline  Motor: No weakness ( she moves all extremities on command)  I personally reviewed labs and imaging reports for today        Last 24 Hours Medication List:   Current Facility-Administered Medications   Medication Dose Route Frequency Provider Last Rate    acetaminophen  650 mg Oral Q6H PRN Jo-Ann VELASCO PA-C      atorvastatin  20 mg Oral Daily With Dinner [employer] PATI VELASCO      Baricitinib  4 mg Oral Q24H Kate Pereira MD      dexamethasone  6 mg Intravenous Q24H [employer] PATI VELASCO      ferrous sulfate  325 mg Oral Daily With Breakfast [employer] PATI VELASCO      heparin (porcine)  3-20 Units/kg/hr (Order-Specific) Intravenous Titrated Miguel Huerta MD 14 Units/kg/hr (02/26/22 0856)    heparin (porcine)  1,200 Units Intravenous Q1H PRN Miguel Huerta MD      heparin (porcine)  2,400 Units Intravenous Q1H PRN Miguel Huerta MD      levothyroxine  25 mcg Oral Early Morning [email or web address] PATI VELASCO      mirtazapine  15 mg Oral HS Jo-Ann VELASCO PA-C      senna-docusate sodium  1 tablet Oral Daily Jo-Ann VELASCO PA-C      sertraline  25 mg Oral Daily Jo-Ann VELASCO PA-C            Today, Patient Was Seen By: Kate Pereira MD    ** Please Note: Dictation voice to text software may have been used in the creation of this document   **

## 2022-02-27 NOTE — ASSESSMENT & PLAN NOTE
Patient was admitted with bilateral COVID pneumonia requiring oxygen via nasal cannula     Patient's oxygen requirements increased today and oxygen need to be increased to 4 liters/minute  Patient clinically looks better, more awake, less hard of hearing  Continue baricitinib, dexamethasone    She completed IV remdesivir      Continue IV heparin for DVT prevention    I discussed case with patient's granddaughter phone on February 27th in detail     I ask Physical therapy to evaluate patient tomorrow    Patient has home oxygen already

## 2022-02-27 NOTE — PLAN OF CARE
Problem: MOBILITY - ADULT  Goal: Maintain or return to baseline ADL function  Description: INTERVENTIONS:  -  Assess patient's ability to carry out ADLs; assess patient's baseline for ADL function and identify physical deficits which impact ability to perform ADLs (bathing, care of mouth/teeth, toileting, grooming, dressing, etc )  - Assess/evaluate cause of self-care deficits   - Assess range of motion  - Assess patient's mobility; develop plan if impaired  - Assess patient's need for assistive devices and provide as appropriate  - Encourage maximum independence but intervene and supervise when necessary  - Involve family in performance of ADLs  - Assess for home care needs following discharge   - Consider OT consult to assist with ADL evaluation and planning for discharge  - Provide patient education as appropriate  Outcome: Progressing  Goal: Maintains/Returns to pre admission functional level  Description: INTERVENTIONS:  - Perform BMAT or MOVE assessment daily    - Set and communicate daily mobility goal to care team and patient/family/caregiver  - Collaborate with rehabilitation services on mobility goals if consulted  - Perform Range of Motion 2 times a day  - Reposition patient every 2 hours    - Dangle patient 2 times a day  - Stand patient 2 times a day  - Ambulate patient 2 times a day  - Out of bed to chair 2 times a day   - Out of bed for meals 2 times a day  - Out of bed for toileting  - Record patient progress and toleration of activity level   Outcome: Progressing     Problem: Potential for Falls  Goal: Patient will remain free of falls  Description: INTERVENTIONS:  - Educate patient/family on patient safety including physical limitations  - Instruct patient to call for assistance with activity   - Consult OT/PT to assist with strengthening/mobility   - Keep Call bell within reach  - Keep bed low and locked with side rails adjusted as appropriate  - Keep care items and personal belongings within reach  - Initiate and maintain comfort rounds  - Make Fall Risk Sign visible to staff  - Offer Toileting every 2 Hours, in advance of need  - Initiate/Maintain bed alarm  - Obtain necessary fall risk management equipment: socks  - Apply yellow socks and bracelet for high fall risk patients  - Consider moving patient to room near nurses station  Outcome: Progressing     Problem: PAIN - ADULT  Goal: Verbalizes/displays adequate comfort level or baseline comfort level  Description: Interventions:  - Encourage patient to monitor pain and request assistance  - Assess pain using appropriate pain scale  - Administer analgesics based on type and severity of pain and evaluate response  - Implement non-pharmacological measures as appropriate and evaluate response  - Consider cultural and social influences on pain and pain management  - Notify physician/advanced practitioner if interventions unsuccessful or patient reports new pain  Outcome: Progressing     Problem: INFECTION - ADULT  Goal: Absence or prevention of progression during hospitalization  Description: INTERVENTIONS:  - Assess and monitor for signs and symptoms of infection  - Monitor lab/diagnostic results  - Monitor all insertion sites, i e  indwelling lines, tubes, and drains  - Monitor endotracheal if appropriate and nasal secretions for changes in amount and color  - [place] appropriate cooling/warming therapies per order  - Administer medications as ordered  - Instruct and encourage patient and family to use good hand hygiene technique  - Identify and instruct in appropriate isolation precautions for identified infection/condition  Outcome: Progressing  Goal: Absence of fever/infection during neutropenic period  Description: INTERVENTIONS:  - Monitor WBC    Outcome: Progressing     Problem: SAFETY ADULT  Goal: Maintain or return to baseline ADL function  Description: INTERVENTIONS:  -  Assess patient's ability to carry out ADLs; assess patient's baseline for ADL function and identify physical deficits which impact ability to perform ADLs (bathing, care of mouth/teeth, toileting, grooming, dressing, etc )  - Assess/evaluate cause of self-care deficits   - Assess range of motion  - Assess patient's mobility; develop plan if impaired  - Assess patient's need for assistive devices and provide as appropriate  - Encourage maximum independence but intervene and supervise when necessary  - Involve family in performance of ADLs  - Assess for home care needs following discharge   - Consider OT consult to assist with ADL evaluation and planning for discharge  - Provide patient education as appropriate  Outcome: Progressing  Goal: Maintains/Returns to pre admission functional level  Description: INTERVENTIONS:  - Perform BMAT or MOVE assessment daily    - Set and communicate daily mobility goal to care team and patient/family/caregiver  - Collaborate with rehabilitation services on mobility goals if consulted  - Perform Range of Motion 2 times a day  - Reposition patient every 2 hours    - Dangle patient 2 times a day  - Stand patient 2 times a day  - Ambulate patient 2 times a day  - Out of bed to chair 2 times a day   - Out of bed for meals 2 times a day  - Out of bed for toileting  - Record patient progress and toleration of activity level   Outcome: Progressing  Goal: Patient will remain free of falls  Description: INTERVENTIONS:  - Educate patient/family on patient safety including physical limitations  - Instruct patient to call for assistance with activity   - Consult OT/PT to assist with strengthening/mobility   - Keep Call bell within reach  - Keep bed low and locked with side rails adjusted as appropriate  - Keep care items and personal belongings within reach  - Initiate and maintain comfort rounds  - Make Fall Risk Sign visible to staff  - Offer Toileting every 2 Hours, in advance of need  - Initiate/Maintain bed alarm  - Obtain necessary fall risk management equipment: socks  - Apply yellow socks and bracelet for high fall risk patients  - Consider moving patient to room near nurses station  Outcome: Progressing     Problem: DISCHARGE PLANNING  Goal: Discharge to home or other facility with appropriate resources  Description: INTERVENTIONS:  - Identify barriers to discharge w/patient and caregiver  - Arrange for needed discharge resources and transportation as appropriate  - Identify discharge learning needs (meds, wound care, etc )  - Arrange for interpretive services to assist at discharge as needed  - Refer to Case Management Department for coordinating discharge planning if the patient needs post-hospital services based on physician/advanced practitioner order or complex needs related to functional status, cognitive ability, or social support system  Outcome: Progressing     Problem: Knowledge Deficit  Goal: Patient/family/caregiver demonstrates understanding of disease process, treatment plan, medications, and discharge instructions  Description: Complete learning assessment and assess knowledge base    Interventions:  - Provide teaching at level of understanding  - Provide teaching via preferred learning methods  Outcome: Progressing     Problem: Prexisting or High Potential for Compromised Skin Integrity  Goal: Skin integrity is maintained or improved  Description: INTERVENTIONS:  - Identify patients at risk for skin breakdown  - Assess and monitor skin integrity  - Assess and monitor nutrition and hydration status  - Monitor labs   - Assess for incontinence   - Turn and reposition patient  - Assist with mobility/ambulation  - Relieve pressure over bony prominences  - Avoid friction and shearing  - Provide appropriate hygiene as needed including keeping skin clean and dry  - Evaluate need for skin moisturizer/barrier cream  - Collaborate with interdisciplinary team   - Patient/family teaching  - Consider wound care consult   Outcome: Progressing     Problem: Nutrition/Hydration-ADULT  Goal: Nutrient/Hydration intake appropriate for improving, restoring or maintaining nutritional needs  Description: Monitor and assess patient's nutrition/hydration status for malnutrition  Collaborate with interdisciplinary team and initiate plan and interventions as ordered  Monitor patient's weight and dietary intake as ordered or per policy  Utilize nutrition screening tool and intervene as necessary  Determine patient's food preferences and provide high-protein, high-caloric foods as appropriate       INTERVENTIONS:  - Monitor oral intake, urinary output, labs, and treatment plans  - Assess nutrition and hydration status and recommend course of action  - Evaluate amount of meals eaten  - Assist patient with eating if necessary   - Allow adequate time for meals  - Recommend/ encourage appropriate diets, oral nutritional supplements, and vitamin/mineral supplements  - Order, calculate, and assess calorie counts as needed  - Recommend, monitor, and adjust tube feedings and TPN/PPN based on assessed needs  - Assess need for intravenous fluids  - Provide specific nutrition/hydration education as appropriate  - Include patient/family/caregiver in decisions related to nutrition  Outcome: Progressing

## 2022-02-27 NOTE — PROGRESS NOTES
[place]  Progress Note - Bacilio Velasco 1934, 80 y o  female MRN: [id number]  Unit/Bed#: -01 Encounter: [id number]  Primary Care Provider: Malka Triplett DO   Date and time admitted to hospital: 2/20/2022 11:41 AM    * Pneumonia due to COVID-19 virus  Assessment & Plan  Patient was admitted with bilateral COVID pneumonia requiring oxygen via nasal cannula     Patient's oxygen requirements increased today and oxygen need to be increased to 4 liters/minute  Patient clinically looks better, more awake, less hard of hearing  Continue baricitinib, dexamethasone    She completed IV remdesivir  Continue IV heparin for DVT prevention    I discussed case with patient's granddaughter phone on February 27th in detail     I ask Physical therapy to evaluate patient tomorrow    Patient has home oxygen already    Acute on chronic respiratory failure with hypoxia [facility])  Assessment & Plan  Patient had acute on chronic hypoxic respiratory failure due to COVID pneumonia as evidenced by respiratory more than 25 and hypoxia requiring treatment with oxygen via mid flow at 10 liters/minute  She uses 2 liters/minute at home at night    COPD (chronic obstructive pulmonary disease) [facility])  Assessment & Plan  Patient has chronic COPD  She had no COPD exacerbation to the hospital stay  Severe protein-calorie malnutrition ([address] )  Assessment & Plan  Malnutrition Findings:   Adult Malnutrition type: Chronic illness  Adult Degree of Malnutrition: Other severe protein calorie malnutrition (Pt presents with severe protein calorie malnutrition as evidenced by sunken orbitals, prominent clavicel bone and temporal scooping  Treat with Regular diet and Ensure QID )    BMI Findings: Body mass index is 19 59 kg/m²  · Dietary supplemenets     Patient has severe protein calorie malnutrition  She has diffuse atrophy of the muscles of the extremities and temporal atrophy      Continue chocolate Ensure nutritional supplements    Pressure injury of sacral region, stage 2 [facility])  Assessment & Plan  Patient has stage II sacral pressure ulcer present on admission without signs of cellulitis  Continue local care, frequent repositioning  VTE Prophylaxis: in place    Patient Centered Rounds: I rounded with patient's nurse    Current Length of Stay: 7 day(s)    Current Patient Status: Inpatient    Certification Statement: Pt requires additional inpatient hospital stay due to: see assessment and plan        Subjective:   Patient's nurse reported that oxygen requirements increased:  Oxygen was increased to 4 liters/minute  Patient denies chest pain, shortness of breath  Cough is improving  Denies pleurisy  Denies abdominal pain    All other ROS are negative    Objective:     Vitals:   Temp (24hrs), Av 1 °F (36 7 °C), Min:97 5 °F (36 4 °C), Max:98 6 °F (37 °C)    Temp:  [97 5 °F (36 4 °C)-98 6 °F (37 °C)] 98 6 °F (37 °C)  HR:  [] 104  Resp:  [20] 20  BP: (111-122)/(54-64) 111/63  SpO2:  [84 %-99 %] 96 %  Body mass index is 19 59 kg/m²  Input and Output Summary (last 24 hours): Intake/Output Summary (Last 24 hours) at 2022 1026  Last data filed at 2022 0804  Gross per 24 hour   Intake --   Output 1360 ml   Net -1360 ml       Physical Exam:     Physical Exam  Constitutional:       General: She is not in acute distress  Eyes:      Conjunctiva/sclera: Conjunctivae normal    Cardiovascular:      Rate and Rhythm: Normal rate and regular rhythm  Pulmonary:      Effort: No respiratory distress  Breath sounds: Rales (Scant crackles are present at the bases) present  No wheezing  Abdominal:      General: Bowel sounds are normal  There is no distension  Palpations: Abdomen is soft  Tenderness: There is no abdominal tenderness  Skin:     General: Skin is warm and dry  Neurological:      Mental Status: She is alert  Mental status is at baseline  Motor: No weakness  I personally reviewed labs and imaging reports for today  Last 24 Hours Medication List:   Current Facility-Administered Medications   Medication Dose Route Frequency Provider Last Rate    acetaminophen  650 mg Oral Q6H PRN Mars VELASCO PA-C      atorvastatin  20 mg Oral Daily With [employer] PATI VELASCO      Baricitinib  4 mg Oral Q24H Niurka Mattson MD      dexamethasone  6 mg Intravenous Q24H Mars VELASCO PA-C      ferrous sulfate  325 mg Oral Daily With Breakfast Jo-Ann VELASCO PA-C      heparin (porcine)  3-20 Units/kg/hr (Order-Specific) Intravenous Titrated Reymundo Nichols MD 16 Units/kg/hr (02/27/22 8280)    heparin (porcine)  1,200 Units Intravenous Q1H PRN Reymundo Nichols MD      heparin (porcine)  2,400 Units Intravenous Q1H PRN Reymundo Nichols MD      levothyroxine  25 mcg Oral Early Morning Mars VELASCO PA-C      mirtazapine  15 mg Oral HS Jo-Ann VELASCO PA-C      senna-docusate sodium  1 tablet Oral Daily Jo-Ann VELASCO PA-C      sertraline  25 mg Oral Daily Jo-Ann VELASCO PA-C            Today, Patient Was Seen By: Niurka Mattson MD    ** Please Note: Dictation voice to text software may have been used in the creation of this document   **

## 2022-02-27 NOTE — PLAN OF CARE
Problem: MOBILITY - ADULT  Goal: Maintain or return to baseline ADL function  Description: INTERVENTIONS:  -  Assess patient's ability to carry out ADLs; assess patient's baseline for ADL function and identify physical deficits which impact ability to perform ADLs (bathing, care of mouth/teeth, toileting, grooming, dressing, etc )  - Assess/evaluate cause of self-care deficits   - Assess range of motion  - Assess patient's mobility; develop plan if impaired  - Assess patient's need for assistive devices and provide as appropriate  - Encourage maximum independence but intervene and supervise when necessary  - Involve family in performance of ADLs  - Assess for home care needs following discharge   - Consider OT consult to assist with ADL evaluation and planning for discharge  - Provide patient education as appropriate  Outcome: Progressing     Problem: Potential for Falls  Goal: Patient will remain free of falls  Description: INTERVENTIONS:  - Educate patient/family on patient safety including physical limitations  - Instruct patient to call for assistance with activity   - Consult OT/PT to assist with strengthening/mobility   - Keep Call bell within reach  - Keep bed low and locked with side rails adjusted as appropriate  - Keep care items and personal belongings within reach  - Initiate and maintain comfort rounds  - Make Fall Risk Sign visible to staff  - Offer Toileting every 2 Hours, in advance of need  - Initiate/Maintain bed alarm  - Obtain necessary fall risk management equipment: socks  - Apply yellow socks and bracelet for high fall risk patients  - Consider moving patient to room near nurses station  Outcome: Progressing     Problem: PAIN - ADULT  Goal: Verbalizes/displays adequate comfort level or baseline comfort level  Description: Interventions:  - Encourage patient to monitor pain and request assistance  - Assess pain using appropriate pain scale  - Administer analgesics based on type and severity of pain and evaluate response  - Implement non-pharmacological measures as appropriate and evaluate response  - Consider cultural and social influences on pain and pain management  - Notify physician/advanced practitioner if interventions unsuccessful or patient reports new pain  Outcome: Progressing     Problem: SAFETY ADULT  Goal: Maintain or return to baseline ADL function  Description: INTERVENTIONS:  -  Assess patient's ability to carry out ADLs; assess patient's baseline for ADL function and identify physical deficits which impact ability to perform ADLs (bathing, care of mouth/teeth, toileting, grooming, dressing, etc )  - Assess/evaluate cause of self-care deficits   - Assess range of motion  - Assess patient's mobility; develop plan if impaired  - Assess patient's need for assistive devices and provide as appropriate  - Encourage maximum independence but intervene and supervise when necessary  - Involve family in performance of ADLs  - Assess for home care needs following discharge   - Consider OT consult to assist with ADL evaluation and planning for discharge  - Provide patient education as appropriate  Outcome: Progressing  Goal: Patient will remain free of falls  Description: INTERVENTIONS:  - Educate patient/family on patient safety including physical limitations  - Instruct patient to call for assistance with activity   - Consult OT/PT to assist with strengthening/mobility   - Keep Call bell within reach  - Keep bed low and locked with side rails adjusted as appropriate  - Keep care items and personal belongings within reach  - Initiate and maintain comfort rounds  - Make Fall Risk Sign visible to staff  - Offer Toileting every 2 Hours, in advance of need  - Initiate/Maintain bed alarm  - Obtain necessary fall risk management equipment: socks  - Apply yellow socks and bracelet for high fall risk patients  - Consider moving patient to room near nurses station  Outcome: Progressing

## 2022-02-27 NOTE — ASSESSMENT & PLAN NOTE
Patient had acute on chronic hypoxic respiratory failure due to COVID pneumonia as evidenced by respiratory more than 25 and hypoxia requiring treatment with oxygen via mid flow at 10 liters/minute        She uses 2 liters/minute at home at night

## 2022-02-28 NOTE — OCCUPATIONAL THERAPY NOTE
Occupational Therapy Evaluation      Juno Ibrahim    2/28/2022    Principal Problem:    Pneumonia due to COVID-19 virus  Active Problems:    Mixed hyperlipidemia    COPD (chronic obstructive pulmonary disease) (HCC)    Lymphoma (HCC)    Severe protein-calorie malnutrition (HCC)    Sepsis ([facility] [address] [facility] )    Acute on chronic respiratory failure with hypoxia (HCC)    Pressure injury of sacral region, stage 2 ([facility] [address] )      Past Medical History:   Diagnosis Date    Anemia     Blind right eye     COPD (chronic obstructive pulmonary disease) (HCC)     COPD (chronic obstructive pulmonary disease) (HCC)     Disease of thyroid gland     Hypothyroidism     TIA (transient ischemic attack)        Past Surgical History:   Procedure Laterality Date    HIP FRACTURE SURGERY Bilateral         02/28/22 1325   OT Last Visit   OT Visit Date 02/28/22   Note Type   Note type Evaluation   Restrictions/Precautions   Weight Bearing Precautions Per Order No   Other Precautions Contact/isolation; Airborne/isolation;O2;Cognitive; Fall Risk;Visual impairment;Hard of hearing  (Covid+)   Pain Assessment   Pain Assessment Tool 0-10   Pain Score No Pain   Home Living   Type of [address] [place] [address] One level; Able to live on main level with bedroom/bathroom   [address] [place] [address],[address]; Wheelchair-manual;Hospital bed   Prior Function   Level of [place] Needs assistance with ADLs and functional mobility; Needs assistance with IADLs   Lives With [facility] Help From Home health; Family   ADL Assistance Needs assistance   IADLs Needs assistance   Comments Pt poor historian and offer minimal verbal communication; information obtained via chart     Psychosocial   Psychosocial (WDL) WDL   ADL   Eating Assistance 4  Minimal Assistance   Grooming Assistance 3  Moderate Assistance   UB Bathing Assistance 2  Maximal Assistance   LB Bathing Assistance 2  Maximal Assistance   UB Dressing Assistance 2  Maximal Assistance   LB Dressing Assistance 2  Maximal Assistance   Toileting Assistance  2  Maximal Assistance   Bed Mobility   Rolling R 3  Moderate assistance   Additional items Assist x 2   Rolling L 3  Moderate assistance   Additional items Assist x 2   Supine to Sit 2  Maximal assistance   Additional items Assist x 2   Sit to Supine 2  Maximal assistance   Additional items Assist x 2   Transfers   Sit to Stand Unable to assess   Activity Tolerance   Activity Tolerance Patient limited by fatigue   Medical Staff Made Aware PT miguel   RUE Assessment   RUE Assessment WFL   LUE Assessment   LUE Assessment WFL   Cognition   Overall Cognitive Status Impaired   Arousal/Participation Cooperative;Lethargic   Attention Attends with cues to redirect   Orientation Level Oriented to person;Oriented to place   Memory Decreased short term memory;Decreased recall of recent events;Decreased recall of precautions   Following Commands Follows one step commands inconsistently   Assessment   Limitation Decreased ADL status; Decreased UE ROM; Decreased UE strength;Decreased Safe judgement during ADL;Decreased cognition;Decreased endurance;Decreased self-care trans   Prognosis Guarded   Assessment Pt is a 80 y o  female seen for OT evaluation at [address] [place] [address], admitted 2/20/2022 w/ Pneumonia due to COVID-19 virus  OT completed extensive review of pt's medical and social history  Comorbidities affecting pt's functional performance at time of assessment include: COPD, lymphoma, malnutrition, sepsis, sacral ulcer, depression   Personal factors affecting pt at time of IE include:difficulty performing ADLS, limited insight into deficits, flat affect, decreased initiation and engagement  and health management   Prior to admission, pt was living in [address] and was assisted by family/caregivers for ADL/IADL   Upon evaluation, pt presents to OT below baseline due to the following performance deficits: weakness, decreased strength, decreased balance, decreased tolerance, impaired initiation, impaired memory and impaired problem solving  Pt to benefit from continued skilled OT tx while in the hospital to address deficits as defined above and maximize level of functional independence w ADL's and functional mobility  Occupational Performance areas to address include: grooming, bathing/shower, toilet hygiene, dressing and functional transfers, bed mobility  The patient's raw score on the AM-PAC Daily Activity inpatient short form is 10,, less than 39 4  Patients at this level are likely to benefit from DC to post-acute rehabilitation services  Based on findings, pt is of high complexity, due to medical complexity  Pt co-treated with physical therapy due to skilled assist of 2 therapists required  At this time, OT recommendations at time of discharge are short term rehab  Per chart, family is possibly interested in taking patient home instead of rehab  If family can care for patient at current level of function, she could return home at bed level with 24hr care  Goals   Patient Goals Lay back down   Plan   Treatment Interventions ADL retraining;Functional transfer training;UE strengthening/ROM; Endurance training;Cognitive reorientation;Patient/family training;Equipment evaluation/education; Compensatory technique education;Continued evaluation; Energy conservation   Goal Expiration Date 03/10/22   OT Frequency 2-3x/wk   Recommendation   OT Discharge Recommendation Post acute rehabilitation services   Additional Comments  Per chart, family is possibly interested in taking patient home instead of rehab  If family can care for patient at current level of function, she could return home at bed level with 24hr care     AM-PAC Daily Activity Inpatient   Lower Body Dressing 1   Bathing 1   Toileting 1   Upper Body Dressing 2   Grooming 2   Eating 3   Daily Activity Raw Score 10   Turning Head Towards Sound 4   Follow Simple Instructions 3   Low Function Daily Activity Raw Score 17   Low Function Daily Activity Standardized Score 28 95   AM-PAC Applied Cognition Inpatient   Following a Speech/Presentation 2   Understanding Ordinary Conversation 3   Taking Medications 1   Remembering Where Things Are Placed or Put Away 2   Remembering List of 4-5 Errands 2   Taking Care of Complicated Tasks 1   Applied Cognition Raw Score 11   Applied Cognition Standardized Score 27 03     Pt will achieve the following goals within 10 days  *Pt will complete grooming with setup  *Pt will complete UB bathing and dressing with setup  *Pt will complete LB bathing and dressing with Min A      *Pt will complete toileting (hygiene and clothing management) with Min A     *Pt will complete bed mobility with Min A x1, with bed flat and no side rail to prep for purposeful tasks    *Pt will perform functional transfers with Min A x1 in order to complete ADL routine  *Pt will increase standing tolerance to 2 minutes in order to complete ADL yovani care  *Pt will demonstrate increased activity tolerance in order to complete ADL routine  *Pt will participate in UE therapeutic exercise in order to maximize strength for ADL transfers  *Pt will sit on EOB for 5+ minutes for increased safety with seated activity tolerance during ADL tasks      [place], MS, OTR/L

## 2022-02-28 NOTE — PLAN OF CARE
Problem: MOBILITY - ADULT  Goal: Maintain or return to baseline ADL function  Description: INTERVENTIONS:  -  Assess patient's ability to carry out ADLs; assess patient's baseline for ADL function and identify physical deficits which impact ability to perform ADLs (bathing, care of mouth/teeth, toileting, grooming, dressing, etc )  - Assess/evaluate cause of self-care deficits   - Assess range of motion  - Assess patient's mobility; develop plan if impaired  - Assess patient's need for assistive devices and provide as appropriate  - Encourage maximum independence but intervene and supervise when necessary  - Involve family in performance of ADLs  - Assess for home care needs following discharge   - Consider OT consult to assist with ADL evaluation and planning for discharge  - Provide patient education as appropriate  Outcome: Progressing  Goal: Maintains/Returns to pre admission functional level  Description: INTERVENTIONS:  - Perform BMAT or MOVE assessment daily    - Set and communicate daily mobility goal to care team and patient/family/caregiver  - Collaborate with rehabilitation services on mobility goals if consulted  - Perform Range of Motion 2 times a day  - Reposition patient every 2 hours    - Dangle patient 2 times a day  - Stand patient 2 times a day  - Ambulate patient 2 times a day  - Out of bed to chair 2 times a day   - Out of bed for meals 2 times a day  - Out of bed for toileting  - Record patient progress and toleration of activity level   Outcome: Progressing     Problem: Potential for Falls  Goal: Patient will remain free of falls  Description: INTERVENTIONS:  - Educate patient/family on patient safety including physical limitations  - Instruct patient to call for assistance with activity   - Consult OT/PT to assist with strengthening/mobility   - Keep Call bell within reach  - Keep bed low and locked with side rails adjusted as appropriate  - Keep care items and personal belongings within reach  - Initiate and maintain comfort rounds  - Make Fall Risk Sign visible to staff  - Offer Toileting every 2 Hours, in advance of need  - Initiate/Maintain bed alarm  - Obtain necessary fall risk management equipment: socks  - Apply yellow socks and bracelet for high fall risk patients  - Consider moving patient to room near nurses station  Outcome: Progressing     Problem: PAIN - ADULT  Goal: Verbalizes/displays adequate comfort level or baseline comfort level  Description: Interventions:  - Encourage patient to monitor pain and request assistance  - Assess pain using appropriate pain scale  - Administer analgesics based on type and severity of pain and evaluate response  - Implement non-pharmacological measures as appropriate and evaluate response  - Consider cultural and social influences on pain and pain management  - Notify physician/advanced practitioner if interventions unsuccessful or patient reports new pain  Outcome: Progressing     Problem: INFECTION - ADULT  Goal: Absence or prevention of progression during hospitalization  Description: INTERVENTIONS:  - Assess and monitor for signs and symptoms of infection  - Monitor lab/diagnostic results  - Monitor all insertion sites, i e  indwelling lines, tubes, and drains  - Monitor endotracheal if appropriate and nasal secretions for changes in amount and color  - [place] appropriate cooling/warming therapies per order  - Administer medications as ordered  - Instruct and encourage patient and family to use good hand hygiene technique  - Identify and instruct in appropriate isolation precautions for identified infection/condition  Outcome: Progressing  Goal: Absence of fever/infection during neutropenic period  Description: INTERVENTIONS:  - Monitor WBC    Outcome: Progressing     Problem: SAFETY ADULT  Goal: Maintain or return to baseline ADL function  Description: INTERVENTIONS:  -  Assess patient's ability to carry out ADLs; assess patient's baseline for ADL function and identify physical deficits which impact ability to perform ADLs (bathing, care of mouth/teeth, toileting, grooming, dressing, etc )  - Assess/evaluate cause of self-care deficits   - Assess range of motion  - Assess patient's mobility; develop plan if impaired  - Assess patient's need for assistive devices and provide as appropriate  - Encourage maximum independence but intervene and supervise when necessary  - Involve family in performance of ADLs  - Assess for home care needs following discharge   - Consider OT consult to assist with ADL evaluation and planning for discharge  - Provide patient education as appropriate  Outcome: Progressing  Goal: Maintains/Returns to pre admission functional level  Description: INTERVENTIONS:  - Perform BMAT or MOVE assessment daily    - Set and communicate daily mobility goal to care team and patient/family/caregiver  - Collaborate with rehabilitation services on mobility goals if consulted  - Perform Range of Motion 2 times a day  - Reposition patient every 2 hours    - Dangle patient 2 times a day  - Stand patient 2 times a day  - Ambulate patient 2 times a day  - Out of bed to chair 2 times a day   - Out of bed for meals 2 times a day  - Out of bed for toileting  - Record patient progress and toleration of activity level   Outcome: Progressing  Goal: Patient will remain free of falls  Description: INTERVENTIONS:  - Educate patient/family on patient safety including physical limitations  - Instruct patient to call for assistance with activity   - Consult OT/PT to assist with strengthening/mobility   - Keep Call bell within reach  - Keep bed low and locked with side rails adjusted as appropriate  - Keep care items and personal belongings within reach  - Initiate and maintain comfort rounds  - Make Fall Risk Sign visible to staff  - Offer Toileting every 2 Hours, in advance of need  - Initiate/Maintain bed alarm  - Obtain necessary fall risk management equipment: socks  - Apply yellow socks and bracelet for high fall risk patients  - Consider moving patient to room near nurses station  Outcome: Progressing     Problem: DISCHARGE PLANNING  Goal: Discharge to home or other facility with appropriate resources  Description: INTERVENTIONS:  - Identify barriers to discharge w/patient and caregiver  - Arrange for needed discharge resources and transportation as appropriate  - Identify discharge learning needs (meds, wound care, etc )  - Arrange for interpretive services to assist at discharge as needed  - Refer to Case Management Department for coordinating discharge planning if the patient needs post-hospital services based on physician/advanced practitioner order or complex needs related to functional status, cognitive ability, or social support system  Outcome: Progressing     Problem: Knowledge Deficit  Goal: Patient/family/caregiver demonstrates understanding of disease process, treatment plan, medications, and discharge instructions  Description: Complete learning assessment and assess knowledge base    Interventions:  - Provide teaching at level of understanding  - Provide teaching via preferred learning methods  Outcome: Progressing     Problem: Prexisting or High Potential for Compromised Skin Integrity  Goal: Skin integrity is maintained or improved  Description: INTERVENTIONS:  - Identify patients at risk for skin breakdown  - Assess and monitor skin integrity  - Assess and monitor nutrition and hydration status  - Monitor labs   - Assess for incontinence   - Turn and reposition patient  - Assist with mobility/ambulation  - Relieve pressure over bony prominences  - Avoid friction and shearing  - Provide appropriate hygiene as needed including keeping skin clean and dry  - Evaluate need for skin moisturizer/barrier cream  - Collaborate with interdisciplinary team   - Patient/family teaching  - Consider wound care consult   Outcome: Progressing     Problem: Nutrition/Hydration-ADULT  Goal: Nutrient/Hydration intake appropriate for improving, restoring or maintaining nutritional needs  Description: Monitor and assess patient's nutrition/hydration status for malnutrition  Collaborate with interdisciplinary team and initiate plan and interventions as ordered  Monitor patient's weight and dietary intake as ordered or per policy  Utilize nutrition screening tool and intervene as necessary  Determine patient's food preferences and provide high-protein, high-caloric foods as appropriate       INTERVENTIONS:  - Monitor oral intake, urinary output, labs, and treatment plans  - Assess nutrition and hydration status and recommend course of action  - Evaluate amount of meals eaten  - Assist patient with eating if necessary   - Allow adequate time for meals  - Recommend/ encourage appropriate diets, oral nutritional supplements, and vitamin/mineral supplements  - Order, calculate, and assess calorie counts as needed  - Recommend, monitor, and adjust tube feedings and TPN/PPN based on assessed needs  - Assess need for intravenous fluids  - Provide specific nutrition/hydration education as appropriate  - Include patient/family/caregiver in decisions related to nutrition  Outcome: Progressing

## 2022-02-28 NOTE — PROGRESS NOTES
[place]  Progress Note - Aravindgail Jeandenisa 1934, 80 y o  female MRN: [id number]  Unit/Bed#: -[facility] Encounter: [id number]  Primary Care Provider: Sara España DO   Date and time admitted to hospital: 2/20/2022 11:41 AM    Pressure injury of sacral region, stage 2 [facility])  Assessment & Plan  Patient has stage II sacral pressure ulcer present on admission without signs of cellulitis  Continue local care, frequent repositioning  Acute on chronic respiratory failure with hypoxia [facility])  Assessment & Plan  Patient had acute on chronic hypoxic respiratory failure due to COVID pneumonia as evidenced by respiratory more than 25 and hypoxia requiring treatment with oxygen via mid flow at 10 liters/minute  She uses 2 liters/minute at home at night    Currently requiring 4 L of supplemental oxygen  Wean as tolerated    Sepsis [facility])  Assessment & Plan  Patient met criteria for sepsis admission as evidenced by respiratory more than 20, heart rate more than 100, leukocytosis more than 10K due to COVID pneumonia  Blood cultures showed no growth so far    Severe protein-calorie malnutrition (HCC)  Assessment & Plan  Malnutrition Findings:   Adult Malnutrition type: Chronic illness  Adult Degree of Malnutrition: Other severe protein calorie malnutrition (Pt presents with severe protein calorie malnutrition as evidenced by sunken orbitals, prominent clavicel bone and temporal scooping  Treat with Regular diet and Ensure QID )    BMI Findings: Body mass index is 19 59 kg/m²  · Dietary supplemenets     Patient has severe protein calorie malnutrition  She has diffuse atrophy of the muscles of the extremities and temporal atrophy  Continue chocolate Ensure nutritional supplements    Lymphoma ([address] )  Assessment & Plan  Patient's history of lymphoma  Patient has increase in her leukocyte count today likely due to steroids  She has anemia without signs of GI  bleeding      Will monitor blood counts    COPD (chronic obstructive pulmonary disease) [facility])  Assessment & Plan  Patient has chronic COPD  She had no COPD exacerbation to the hospital stay  Mixed hyperlipidemia  Assessment & Plan  · Continue Lipitor    * Pneumonia due to COVID-19 virus  Assessment & Plan  Patient was admitted with bilateral COVID pneumonia requiring oxygen via nasal cannula   Currently on 4 L of supplemental oxygen  Patient clinically looks better, more awake, less hard of hearing  Continue baricitinib, dexamethasone  She completed IV remdesivir  Continue IV heparin for DVT prevention  Encourage incentive spirometer use  P T /OT  Trend inflammatory markers  Labs & Imaging: I have personally reviewed pertinent reports  VTE Prophylaxis: in place  Code Status:   Level 3 - DNAR and DNI    Patient Centered Rounds: I have performed bedside rounds with nursing staff today  Discussions with Specialists or Other Care Team Provider: CM    Education and Discussions with Family / Patient:  Attempted to call grandchild-no response    Current Length of Stay: 8 day(s)    Current Patient Status: Inpatient   Certification Statement: The patient will continue to require additional inpatient hospital stay due to see my assessment and plan  Subjective:   Patient is seen and examined at bedside  Denies any new complaints  Afebrile  On 4 L of supplemental oxygen  Feels weak and tired  All other ROS are negative  Objective:    Vitals: Blood pressure 114/58, pulse 88, temperature 99 4 °F (37 4 °C), resp  rate 20, height 4' 11" (1 499 m), weight 44 kg (97 lb), SpO2 92 %  ,Body mass index is 19 59 kg/m²    SPO2 RA Rest      ED to Hosp-Admission (Current) from 2/20/2022 in [address] Med Surg Unit   SpO2 92 %   SpO2 Activity At Rest   O2 Device Nasal cannula   O2 Flow Rate 4 L/min        I&O:     Intake/Output Summary (Last 24 hours) at 2/28/2022 1129  Last data filed at 2/28/2022 0245  Gross per 24 hour   Intake 150 ml   Output 850 ml   Net -700 ml       Physical Exam:    General- Alert, lying comfortably in bed  Not in any acute distress  Neck- Supple, No JVD  CVS- regular, S1 and S2 normal  Chest- Bilateral Air entry, decreased at bases  Abdomen- soft, nontender, not distended, no guarding or rigidity, BS+  Extremities-  No pedal edema, No calf tenderness  CNS-   Alert, awake and orientedx3  No focal deficits present  Invasive Devices  Report    Peripheral Intravenous Line            Peripheral IV 02/26/22 Left Antecubital 2 days    Peripheral IV 02/27/22 Right;Ventral (anterior) Forearm 1 day          Drain            External Urinary Catheter 1 day                      Social History  reviewed  History reviewed  No pertinent family history   reviewed    Meds:  Current Facility-Administered Medications   Medication Dose Route Frequency Provider Last Rate Last Admin    acetaminophen (TYLENOL) tablet 650 mg  650 mg Oral Q6H PRN RAÚL Barnes-C   650 mg at 02/21/22 0341    atorvastatin (LIPITOR) tablet 20 mg  20 mg Oral Daily With [employer] RAÚL VELASCO-C   20 mg at 02/27/22 1613    Baricitinib (OLUMIANT) (COVID EUA) tablet 4 mg  4 mg Oral Q24H Lc Wade MD   4 mg at 02/27/22 1618    dexamethasone (DECADRON) injection 6 mg  6 mg Intravenous Q24H Jo-Ann VELASCO PA-C   6 mg at 02/27/22 1401    ferrous sulfate tablet 325 mg  325 mg Oral Daily With [employer] KRISTA PA-C   325 mg at 02/28/22 1370    heparin (porcine) 25,000 units in 0 45% NaCl 250 mL infusion (premix)  3-20 Units/kg/hr (Order-Specific) Intravenous Titrated Sandy Hernandez MD 6 4 mL/hr at 02/28/22 0925 16 Units/kg/hr at 02/28/22 0925    heparin (porcine) injection 1,200 Units  1,200 Units Intravenous Q1H PRN Sandy Hernandez MD   1,200 Units at 02/27/22 0648    heparin (porcine) injection 2,400 Units  2,400 Units Intravenous Q1H PRN Sandy Hernandez MD   2,400 Units at 02/22/22 1657    levothyroxine tablet 25 mcg  25 mcg Oral Early Morning Jo-Ann Clairt KRISTA, PA-C   25 mcg at 02/28/22 0543    mirtazapine (REMERON) tablet 15 mg  15 mg Oral HS Jo-Ann Selft KRISTA, PA-C   15 mg at 02/27/22 2144    senna-docusate sodium (SENOKOT S) 8 6-50 mg per tablet 1 tablet  1 tablet Oral Daily Jo-Annmarge Yañez V, PA-C   1 tablet at 02/28/22 0920    sertraline (ZOLOFT) tablet 25 mg  25 mg Oral Daily Jo-Ann Austinyojanat KRISTA, PA-C   25 mg at 02/28/22 4833      Medications Prior to Admission   Medication    Albuterol Sulfate 108 (90 Base) MCG/ACT AEPB    atorvastatin (LIPITOR) 20 mg tablet    Calcium Carbonate-Vitamin D 600-200 MG-UNIT TABS    co-enzyme Q-10 30 MG capsule    ferrous sulfate 325 (65 Fe) mg tablet    levothyroxine 25 mcg tablet    mirtazapine (REMERON) 7 5 MG tablet    Multiple Vitamin (multivitamin) capsule    senna-docusate sodium (SENOKOT S) 8 6-50 mg per tablet    sertraline (ZOLOFT) 25 mg tablet       Labs:  Results from last 7 days   Lab Units 02/28/22 0353 02/27/22 0443 02/25/22 0450 02/24/22 0439 02/24/22 0439 02/23/22 0326 02/22/22  0445   WBC Thousand/uL 18 29* 18 34* 20 31*   < > 20 88*   < > 17 29*   HEMOGLOBIN g/dL 7 7* 8 0* 7 2*   < > 7 1*   < > 7 5*   HEMATOCRIT % 26 0* 26 3* 22 9*   < > 22 9*   < > 25 6*   PLATELETS Thousands/uL 626* 573* 619*   < > 598*   < > 570*   LYMPHO PCT % 60*  --   --   --  79*  --  32   MONO PCT % 4  --   --   --  2*  --  0*   EOS PCT % 0  --   --   --  0  --  0    < > = values in this interval not displayed       Results from last 7 days   Lab Units 02/27/22 0443 02/25/22  0450 02/24/22  0439 02/23/22  0327 02/23/22  0327   POTASSIUM mmol/L 4 6 4 3 3 9   < > 3 6   CHLORIDE mmol/L 100 106 111*   < > 111*   CO2 mmol/L 31 30 29   < > 28   BUN mg/dL 17 20 23   < > 20   CREATININE mg/dL 0 38* 0 42* 0 44*   < > 0 44*   CALCIUM mg/dL 7 9* 7 9* 7 7*   < > 7 4*   ALK PHOS U/L  --  129* 125*  --  122*   ALT U/L  --  67 65  --  60   AST U/L  --  59* 59*  -- 63*    < > = values in this interval not displayed  Lab Results   Component Value Date    TROPONINI <0 02 10/07/2021         Lab Results   Component Value Date    BLOODCX No Growth After 5 Days  02/20/2022    BLOODCX No Growth After 5 Days  02/20/2022    BLOODCX No Growth After 5 Days  11/08/2021    BLOODCX No Growth After 5 Days  11/08/2021         Imaging:  Results for orders placed during the hospital encounter of 02/20/22    XR chest portable    Narrative  CHEST    INDICATION:   covid pneumonia, increasing oxygen requirement  Patient has confirmed COVID-19  Positive on 2/20/2022  COMPARISON:  Chest radiograph from 2/20/2020  EXAM PERFORMED/VIEWS:  XR CHEST PORTABLE      FINDINGS:    Cardiomediastinal silhouette appears unremarkable  Worsening bilateral ground glass opacity, greater on the right  No effusion or pneumothorax  Moderate curvature of the spine  Impression  Worsening bilateral groundglass opacity, greater on the right, due to Covid 19  Workstation performed: [id number]    Results for orders placed during the hospital encounter of 11/08/21    XR chest pa & lateral    Narrative  CHEST    INDICATION:   Pneumonia  COMPARISON:  Compared with 11/8/2021    EXAM PERFORMED/VIEWS:  XR CHEST PA & LATERAL      FINDINGS:    Cardiomediastinal silhouette appears unremarkable  Right lung base opacity demonstrates improvement with slight decrease in the left lung base opacity  Poor inspiration  Unchanged blunted left costophrenic angle  Osseous structures appear within normal limits for patient age  Impression  Improving bibasilar infiltrates with small left effusion  Continued follow-up to resolution to exclude associated mass              Workstation performed: [id number]      Last 24 Hours Medication List:   Current Facility-Administered Medications   Medication Dose Route Frequency Provider Last Rate    acetaminophen  650 mg Oral Q6H PRN Jessica Alicea PA-C      atorvastatin  20 mg Oral Daily With [employer] PATI VELASCO      Baricitinib  4 mg Oral Q24H Gary Kerr MD      dexamethasone  6 mg Intravenous Q24H [facility] PATI VELASCO      ferrous sulfate  325 mg Oral Daily With Breakfast [facility] PATI VELASCO      heparin (porcine)  3-20 Units/kg/hr (Order-Specific) Intravenous Titrated Yesenia Garcia MD 16 Units/kg/hr (02/28/22 0925)    heparin (porcine)  1,200 Units Intravenous Q1H PRN Yesenia Garcia MD      heparin (porcine)  2,400 Units Intravenous Q1H PRN Yesenia Garcia MD      levothyroxine  25 mcg Oral Early Morning [facility] PATI VELASCO      mirtazapine  15 mg Oral HS Jo-Ann VELASCO PA-C      senna-docusate sodium  1 tablet Oral Daily Jo-Ann VELASCO PA-C      sertraline  25 mg Oral Daily Jo-Ann VELASCO PA-C          Today, Patient Was Seen By: Kamini Rodriguez MD    ** Please Note: Dictation voice to text software may have been used in the creation of this document   **

## 2022-02-28 NOTE — ASSESSMENT & PLAN NOTE
Patient was admitted with bilateral COVID pneumonia requiring oxygen via nasal cannula   Currently on 4 L of supplemental oxygen  Patient clinically looks better, more awake, less hard of hearing  Continue baricitinib, dexamethasone  She completed IV remdesivir  Continue IV heparin for DVT prevention  Encourage incentive spirometer use  P T /OT  Trend inflammatory markers

## 2022-02-28 NOTE — ASSESSMENT & PLAN NOTE
Patient had acute on chronic hypoxic respiratory failure due to COVID pneumonia as evidenced by respiratory more than 25 and hypoxia requiring treatment with oxygen via mid flow at 10 liters/minute    She uses 2 liters/minute at home at night    Currently requiring 4 L of supplemental oxygen  Wean as tolerated

## 2022-02-28 NOTE — ASSESSMENT & PLAN NOTE
Patient's history of lymphoma  Patient has increase in her leukocyte count today likely due to steroids  She has anemia without signs of GI  bleeding      Will monitor blood counts

## 2022-02-28 NOTE — PLAN OF CARE
Problem: OCCUPATIONAL THERAPY ADULT  Goal: Performs self-care activities at highest level of function for planned discharge setting  See evaluation for individualized goals  Description:   Outcome: Progressing  Note: Limitation: Decreased ADL status,Decreased UE ROM,Decreased UE strength,Decreased Safe judgement during ADL,Decreased cognition,Decreased endurance,Decreased self-care trans  Prognosis: Guarded  Assessment: Pt is a 80 y o  female seen for OT evaluation at [address] [place] [address], admitted 2/20/2022 w/ Pneumonia due to COVID-19 virus  OT completed extensive review of pt's medical and social history  Comorbidities affecting pt's functional performance at time of assessment include: COPD, lymphoma, malnutrition, sepsis, sacral ulcer, depression   Personal factors affecting pt at time of IE include:difficulty performing ADLS, limited insight into deficits, flat affect, decreased initiation and engagement  and health management   Prior to admission, pt was living in [address] and was assisted by family/caregivers for ADL/IADL  Upon evaluation, pt presents to OT below baseline due to the following performance deficits: weakness, decreased strength, decreased balance, decreased tolerance, impaired initiation, impaired memory and impaired problem solving  Pt to benefit from continued skilled OT tx while in the hospital to address deficits as defined above and maximize level of functional independence w ADL's and functional mobility  Occupational Performance areas to address include: grooming, bathing/shower, toilet hygiene, dressing and functional transfers, bed mobility  The patient's raw score on the AM-PAC Daily Activity inpatient short form is 10,, less than 39 4  Patients at this level are likely to benefit from DC to post-acute rehabilitation services  Based on findings, pt is of high complexity, due to medical complexity   Pt co-treated with physical therapy due to skilled assist of 2 therapists required  At this time, OT recommendations at time of discharge are short term rehab  Per chart, family is possibly interested in taking patient home instead of rehab  If family can care for patient at current level of function, she could return home at bed level with 24hr care       OT Discharge Recommendation: Post acute rehabilitation services

## 2022-02-28 NOTE — PHYSICAL THERAPY NOTE
PT eval   02/28/22 1330   PT Last Visit   PT Visit Date 02/28/22   Note Type   Note type Evaluation   Pain Assessment   Pain Assessment Tool 0-10   Pain Score No Pain   Restrictions/Precautions   Other Precautions O2;Fall Risk;Multiple lines; Bed Alarm;Droplet precautions; Airborne/isolation;Contact/isolation;Visual impairment;Hard of hearing  (wears glassess)   Home Living   Type of [address] [place] [address] One level; Able to live on main level with bedroom/bathroom   [address] [facility]; Wheelchair-manual;Hospital bed   Prior Function   Level of [place] Needs assistance with IADLs; Needs assistance with ADLs and functional mobility   Lives With [facility] Help From Home health; Family  (home care aide days while family is working)   General   Additional Pertinent History lymphoma-not treating, adm with COvid pneumonia, very porr PO intake PTA and during this admission   Family/Caregiver Present No   Cognition   Overall Cognitive Status Unable to assess   Arousal/Participation Arousable   Orientation Level Oriented to person;Oriented to place   Memory Decreased short term memory;Decreased recall of recent events;Decreased recall of precautions   Following Commands Follows one step commands inconsistently   Subjective   Subjective agrees to sit up at EOB   RUE Assessment   RUE Assessment   (PROM WFL)   LUE Assessment   LUE Assessment   (PROM WFL)   RLE Assessment   RLE Assessment   (PROm WFL knee strength 2-/5)   LLE Assessment   LLE Assessment   (PROM WFL, Knee strength 2-/5)   Coordination   Movements are Fluid and Coordinated 0   Coordination and Movement Description bradykinetic   Proprioception   RLE Proprioception Impaired   LLE Proprioception Impaired   Bed Mobility   Rolling R 3  Moderate assistance   Additional items Assist x 2   Rolling L 3  Moderate assistance   Additional items Assist x 2   Supine to Sit 2  Maximal assistance   Additional items Assist x 2; Increased time required;Verbal cues;LE management;HOB elevated   Sit to Supine 2  Maximal assistance   Additional items Assist x 2; Increased time required;Verbal cues;LE management   Transfers   Sit to Stand Unable to assess   Ambulation/Elevation   Gait pattern   (unable to assess)   Balance   Static Sitting Poor   Dynamic Sitting Poor   Endurance Deficit   Endurance Deficit Yes   Endurance Deficit Description tires quickly   Activity Tolerance   Activity Tolerance Patient limited by fatigue   Medical Staff Made Aware OT Renetta   Assessment   Prognosis Fair   Problem List Decreased strength;Decreased endurance; Impaired balance;Decreased mobility; Decreased coordination;Decreased skin integrity   Assessment Pt able to sit EOB for @5 min with support  02 sats acceptable on 4L 02, cues to avoid mouth breathing  REturned to bed and repositioned R side to decrease sacral pressure  Needs in reach  Appears pt is basically bed level at this time  No desire and would be dependent for any transfer oob  Appears approrpatie to return home with family with 24hr care  Poor nutritional status negatively impacts rehab potential  d/c PT, remain available for family training or ref to home PT       Goals   Patient Goals rest   Plan   PT Frequency   (d/c PT)   Recommendation   PT Discharge Recommendation   (24hr care)   AM-PAC Basic Mobility Inpatient   Turning in Bed Without Bedrails 1   Lying on Back to Sitting on Edge of Flat Bed 1   Moving Bed to Chair 1   Standing Up From Chair 1   Walk in Room 1   Climb 3-5 Stairs 1   Basic Mobility Inpatient Raw Score 6   Turning Head Towards Sound 3   Follow Simple Instructions 3   Low Function Basic Mobility Raw Score 12   Low Function Basic Mobility Standardized Score 18 33   Highest Level Of Mobility   JH-HLM Goal 2: Bed activities/Dependent transfer   JH-HLM Highest Level of Mobility 2: Bed activities/Dependent transfer   Modified Taylor Scale   Modified Erik Scale 5   Krystal Dumont, PT

## 2022-03-01 NOTE — PLAN OF CARE
Problem: MOBILITY - ADULT  Goal: Maintain or return to baseline ADL function  Description: INTERVENTIONS:  -  Assess patient's ability to carry out ADLs; assess patient's baseline for ADL function and identify physical deficits which impact ability to perform ADLs (bathing, care of mouth/teeth, toileting, grooming, dressing, etc )  - Assess/evaluate cause of self-care deficits   - Assess range of motion  - Assess patient's mobility; develop plan if impaired  - Assess patient's need for assistive devices and provide as appropriate  - Encourage maximum independence but intervene and supervise when necessary  - Involve family in performance of ADLs  - Assess for home care needs following discharge   - Consider OT consult to assist with ADL evaluation and planning for discharge  - Provide patient education as appropriate  Outcome: Progressing  Goal: Maintains/Returns to pre admission functional level  Description: INTERVENTIONS:  - Perform BMAT or MOVE assessment daily    - Set and communicate daily mobility goal to care team and patient/family/caregiver  - Collaborate with rehabilitation services on mobility goals if consulted  - Perform Range of Motion 2 times a day  - Reposition patient every 2 hours    - Dangle patient 2 times a day  - Stand patient 2 times a day  - Ambulate patient 2 times a day  - Out of bed to chair 2 times a day   - Out of bed for meals 2 times a day  - Out of bed for toileting  - Record patient progress and toleration of activity level   Outcome: Progressing     Problem: Potential for Falls  Goal: Patient will remain free of falls  Description: INTERVENTIONS:  - Educate patient/family on patient safety including physical limitations  - Instruct patient to call for assistance with activity   - Consult OT/PT to assist with strengthening/mobility   - Keep Call bell within reach  - Keep bed low and locked with side rails adjusted as appropriate  - Keep care items and personal belongings within reach  - Initiate and maintain comfort rounds  - Make Fall Risk Sign visible to staff  - Offer Toileting every 2 Hours, in advance of need  - Initiate/Maintain bed alarm  - Obtain necessary fall risk management equipment: socks  - Apply yellow socks and bracelet for high fall risk patients  - Consider moving patient to room near nurses station  Outcome: Progressing     Problem: PAIN - ADULT  Goal: Verbalizes/displays adequate comfort level or baseline comfort level  Description: Interventions:  - Encourage patient to monitor pain and request assistance  - Assess pain using appropriate pain scale  - Administer analgesics based on type and severity of pain and evaluate response  - Implement non-pharmacological measures as appropriate and evaluate response  - Consider cultural and social influences on pain and pain management  - Notify physician/advanced practitioner if interventions unsuccessful or patient reports new pain  Outcome: Progressing     Problem: INFECTION - ADULT  Goal: Absence or prevention of progression during hospitalization  Description: INTERVENTIONS:  - Assess and monitor for signs and symptoms of infection  - Monitor lab/diagnostic results  - Monitor all insertion sites, i e  indwelling lines, tubes, and drains  - Monitor endotracheal if appropriate and nasal secretions for changes in amount and color  - [place] appropriate cooling/warming therapies per order  - Administer medications as ordered  - Instruct and encourage patient and family to use good hand hygiene technique  - Identify and instruct in appropriate isolation precautions for identified infection/condition  Outcome: Progressing  Goal: Absence of fever/infection during neutropenic period  Description: INTERVENTIONS:  - Monitor WBC    Outcome: Progressing     Problem: SAFETY ADULT  Goal: Maintain or return to baseline ADL function  Description: INTERVENTIONS:  -  Assess patient's ability to carry out ADLs; assess patient's baseline for ADL function and identify physical deficits which impact ability to perform ADLs (bathing, care of mouth/teeth, toileting, grooming, dressing, etc )  - Assess/evaluate cause of self-care deficits   - Assess range of motion  - Assess patient's mobility; develop plan if impaired  - Assess patient's need for assistive devices and provide as appropriate  - Encourage maximum independence but intervene and supervise when necessary  - Involve family in performance of ADLs  - Assess for home care needs following discharge   - Consider OT consult to assist with ADL evaluation and planning for discharge  - Provide patient education as appropriate  Outcome: Progressing  Goal: Maintains/Returns to pre admission functional level  Description: INTERVENTIONS:  - Perform BMAT or MOVE assessment daily    - Set and communicate daily mobility goal to care team and patient/family/caregiver  - Collaborate with rehabilitation services on mobility goals if consulted  - Perform Range of Motion 2 times a day  - Reposition patient every 2 hours    - Dangle patient 2 times a day  - Stand patient 2 times a day  - Ambulate patient 2 times a day  - Out of bed to chair 2 times a day   - Out of bed for meals 2 times a day  - Out of bed for toileting  - Record patient progress and toleration of activity level   Outcome: Progressing  Goal: Patient will remain free of falls  Description: INTERVENTIONS:  - Educate patient/family on patient safety including physical limitations  - Instruct patient to call for assistance with activity   - Consult OT/PT to assist with strengthening/mobility   - Keep Call bell within reach  - Keep bed low and locked with side rails adjusted as appropriate  - Keep care items and personal belongings within reach  - Initiate and maintain comfort rounds  - Make Fall Risk Sign visible to staff  - Offer Toileting every 2 Hours, in advance of need  - Initiate/Maintain bed alarm  - Obtain necessary fall risk management equipment: socks  - Apply yellow socks and bracelet for high fall risk patients  - Consider moving patient to room near nurses station  Outcome: Progressing     Problem: DISCHARGE PLANNING  Goal: Discharge to home or other facility with appropriate resources  Description: INTERVENTIONS:  - Identify barriers to discharge w/patient and caregiver  - Arrange for needed discharge resources and transportation as appropriate  - Identify discharge learning needs (meds, wound care, etc )  - Arrange for interpretive services to assist at discharge as needed  - Refer to Case Management Department for coordinating discharge planning if the patient needs post-hospital services based on physician/advanced practitioner order or complex needs related to functional status, cognitive ability, or social support system  Outcome: Progressing     Problem: Knowledge Deficit  Goal: Patient/family/caregiver demonstrates understanding of disease process, treatment plan, medications, and discharge instructions  Description: Complete learning assessment and assess knowledge base    Interventions:  - Provide teaching at level of understanding  - Provide teaching via preferred learning methods  Outcome: Progressing     Problem: Prexisting or High Potential for Compromised Skin Integrity  Goal: Skin integrity is maintained or improved  Description: INTERVENTIONS:  - Identify patients at risk for skin breakdown  - Assess and monitor skin integrity  - Assess and monitor nutrition and hydration status  - Monitor labs   - Assess for incontinence   - Turn and reposition patient  - Assist with mobility/ambulation  - Relieve pressure over bony prominences  - Avoid friction and shearing  - Provide appropriate hygiene as needed including keeping skin clean and dry  - Evaluate need for skin moisturizer/barrier cream  - Collaborate with interdisciplinary team   - Patient/family teaching  - Consider wound care consult   Outcome: Progressing     Problem: Nutrition/Hydration-ADULT  Goal: Nutrient/Hydration intake appropriate for improving, restoring or maintaining nutritional needs  Description: Monitor and assess patient's nutrition/hydration status for malnutrition  Collaborate with interdisciplinary team and initiate plan and interventions as ordered  Monitor patient's weight and dietary intake as ordered or per policy  Utilize nutrition screening tool and intervene as necessary  Determine patient's food preferences and provide high-protein, high-caloric foods as appropriate       INTERVENTIONS:  - Monitor oral intake, urinary output, labs, and treatment plans  - Assess nutrition and hydration status and recommend course of action  - Evaluate amount of meals eaten  - Assist patient with eating if necessary   - Allow adequate time for meals  - Recommend/ encourage appropriate diets, oral nutritional supplements, and vitamin/mineral supplements  - Order, calculate, and assess calorie counts as needed  - Recommend, monitor, and adjust tube feedings and TPN/PPN based on assessed needs  - Assess need for intravenous fluids  - Provide specific nutrition/hydration education as appropriate  - Include patient/family/caregiver in decisions related to nutrition  Outcome: Progressing

## 2022-03-01 NOTE — PLAN OF CARE
Problem: MOBILITY - ADULT  Goal: Maintain or return to baseline ADL function  Description: INTERVENTIONS:  -  Assess patient's ability to carry out ADLs; assess patient's baseline for ADL function and identify physical deficits which impact ability to perform ADLs (bathing, care of mouth/teeth, toileting, grooming, dressing, etc )  - Assess/evaluate cause of self-care deficits   - Assess range of motion  - Assess patient's mobility; develop plan if impaired  - Assess patient's need for assistive devices and provide as appropriate  - Encourage maximum independence but intervene and supervise when necessary  - Involve family in performance of ADLs  - Assess for home care needs following discharge   - Consider OT consult to assist with ADL evaluation and planning for discharge  - Provide patient education as appropriate  Outcome: Progressing  Goal: Maintains/Returns to pre admission functional level  Description: INTERVENTIONS:  - Perform BMAT or MOVE assessment daily    - Set and communicate daily mobility goal to care team and patient/family/caregiver  - Collaborate with rehabilitation services on mobility goals if consulted  - Perform Range of Motion 2 times a day  - Reposition patient every 2 hours    - Dangle patient 2 times a day  - Stand patient 2 times a day  - Ambulate patient 2 times a day  - Out of bed to chair 2 times a day   - Out of bed for meals 2 times a day  - Out of bed for toileting  - Record patient progress and toleration of activity level   Outcome: Progressing     Problem: Potential for Falls  Goal: Patient will remain free of falls  Description: INTERVENTIONS:  - Educate patient/family on patient safety including physical limitations  - Instruct patient to call for assistance with activity   - Consult OT/PT to assist with strengthening/mobility   - Keep Call bell within reach  - Keep bed low and locked with side rails adjusted as appropriate  - Keep care items and personal belongings within reach  - Initiate and maintain comfort rounds  - Make Fall Risk Sign visible to staff  - Offer Toileting every 2 Hours, in advance of need  - Initiate/Maintain bed alarm  - Obtain necessary fall risk management equipment: socks  - Apply yellow socks and bracelet for high fall risk patients  - Consider moving patient to room near nurses station  Outcome: Progressing     Problem: PAIN - ADULT  Goal: Verbalizes/displays adequate comfort level or baseline comfort level  Description: Interventions:  - Encourage patient to monitor pain and request assistance  - Assess pain using appropriate pain scale  - Administer analgesics based on type and severity of pain and evaluate response  - Implement non-pharmacological measures as appropriate and evaluate response  - Consider cultural and social influences on pain and pain management  - Notify physician/advanced practitioner if interventions unsuccessful or patient reports new pain  Outcome: Progressing     Problem: INFECTION - ADULT  Goal: Absence or prevention of progression during hospitalization  Description: INTERVENTIONS:  - Assess and monitor for signs and symptoms of infection  - Monitor lab/diagnostic results  - Monitor all insertion sites, i e  indwelling lines, tubes, and drains  - Monitor endotracheal if appropriate and nasal secretions for changes in amount and color  - [place] appropriate cooling/warming therapies per order  - Administer medications as ordered  - Instruct and encourage patient and family to use good hand hygiene technique  - Identify and instruct in appropriate isolation precautions for identified infection/condition  Outcome: Progressing  Goal: Absence of fever/infection during neutropenic period  Description: INTERVENTIONS:  - Monitor WBC    Outcome: Progressing     Problem: SAFETY ADULT  Goal: Maintain or return to baseline ADL function  Description: INTERVENTIONS:  -  Assess patient's ability to carry out ADLs; assess patient's baseline for ADL function and identify physical deficits which impact ability to perform ADLs (bathing, care of mouth/teeth, toileting, grooming, dressing, etc )  - Assess/evaluate cause of self-care deficits   - Assess range of motion  - Assess patient's mobility; develop plan if impaired  - Assess patient's need for assistive devices and provide as appropriate  - Encourage maximum independence but intervene and supervise when necessary  - Involve family in performance of ADLs  - Assess for home care needs following discharge   - Consider OT consult to assist with ADL evaluation and planning for discharge  - Provide patient education as appropriate  Outcome: Progressing  Goal: Maintains/Returns to pre admission functional level  Description: INTERVENTIONS:  - Perform BMAT or MOVE assessment daily    - Set and communicate daily mobility goal to care team and patient/family/caregiver  - Collaborate with rehabilitation services on mobility goals if consulted  - Perform Range of Motion 2 times a day  - Reposition patient every 2 hours    - Dangle patient 2 times a day  - Stand patient 2 times a day  - Ambulate patient 2 times a day  - Out of bed to chair 2 times a day   - Out of bed for meals 2 times a day  - Out of bed for toileting  - Record patient progress and toleration of activity level   Outcome: Progressing  Goal: Patient will remain free of falls  Description: INTERVENTIONS:  - Educate patient/family on patient safety including physical limitations  - Instruct patient to call for assistance with activity   - Consult OT/PT to assist with strengthening/mobility   - Keep Call bell within reach  - Keep bed low and locked with side rails adjusted as appropriate  - Keep care items and personal belongings within reach  - Initiate and maintain comfort rounds  - Make Fall Risk Sign visible to staff  - Offer Toileting every 2 Hours, in advance of need  - Initiate/Maintain bed alarm  - Obtain necessary fall risk management equipment: socks  - Apply yellow socks and bracelet for high fall risk patients  - Consider moving patient to room near nurses station  Outcome: Progressing     Problem: DISCHARGE PLANNING  Goal: Discharge to home or other facility with appropriate resources  Description: INTERVENTIONS:  - Identify barriers to discharge w/patient and caregiver  - Arrange for needed discharge resources and transportation as appropriate  - Identify discharge learning needs (meds, wound care, etc )  - Arrange for interpretive services to assist at discharge as needed  - Refer to Case Management Department for coordinating discharge planning if the patient needs post-hospital services based on physician/advanced practitioner order or complex needs related to functional status, cognitive ability, or social support system  Outcome: Progressing     Problem: Knowledge Deficit  Goal: Patient/family/caregiver demonstrates understanding of disease process, treatment plan, medications, and discharge instructions  Description: Complete learning assessment and assess knowledge base    Interventions:  - Provide teaching at level of understanding  - Provide teaching via preferred learning methods  Outcome: Progressing     Problem: Prexisting or High Potential for Compromised Skin Integrity  Goal: Skin integrity is maintained or improved  Description: INTERVENTIONS:  - Identify patients at risk for skin breakdown  - Assess and monitor skin integrity  - Assess and monitor nutrition and hydration status  - Monitor labs   - Assess for incontinence   - Turn and reposition patient  - Assist with mobility/ambulation  - Relieve pressure over bony prominences  - Avoid friction and shearing  - Provide appropriate hygiene as needed including keeping skin clean and dry  - Evaluate need for skin moisturizer/barrier cream  - Collaborate with interdisciplinary team   - Patient/family teaching  - Consider wound care consult   Outcome: Progressing     Problem: Nutrition/Hydration-ADULT  Goal: Nutrient/Hydration intake appropriate for improving, restoring or maintaining nutritional needs  Description: Monitor and assess patient's nutrition/hydration status for malnutrition  Collaborate with interdisciplinary team and initiate plan and interventions as ordered  Monitor patient's weight and dietary intake as ordered or per policy  Utilize nutrition screening tool and intervene as necessary  Determine patient's food preferences and provide high-protein, high-caloric foods as appropriate       INTERVENTIONS:  - Monitor oral intake, urinary output, labs, and treatment plans  - Assess nutrition and hydration status and recommend course of action  - Evaluate amount of meals eaten  - Assist patient with eating if necessary   - Allow adequate time for meals  - Recommend/ encourage appropriate diets, oral nutritional supplements, and vitamin/mineral supplements  - Order, calculate, and assess calorie counts as needed  - Recommend, monitor, and adjust tube feedings and TPN/PPN based on assessed needs  - Assess need for intravenous fluids  - Provide specific nutrition/hydration education as appropriate  - Include patient/family/caregiver in decisions related to nutrition  Outcome: Progressing

## 2022-03-01 NOTE — PROGRESS NOTES
Pastoral Care Progress Note    3/1/2022  Patient: Giuliana Ibarra : 1934  Admission Date & Time: 2022 1141  MRN: [id number] CSN: [id number]                     Chaplaincy Interventions Utilized:   Relationship Building: Cultivated a relationship of care and support  Patient is very spiritual and said she prays often for all the things she has gone through  I prayed the  psalm and she knew it by heart  She was grateful for the spiritual care visit     Ritual: Provided prayer and Read sacred text     22 1400   Clinical Encounter Type   Visited With Patient   Routine Visit Follow-up   Referral To   (census/rounds)   [employer] Encounters   [employer] Needs [facility] Text;Prayer

## 2022-03-01 NOTE — PROGRESS NOTES
Progress Note - Palliative & Supportive Care  Bacilio Velasco  80 y o   female  /-01   MRN: [id number]  Encounter: [id number]     Assessment/Problems Actively Addressed this Visit:  Goals of care counseling  Severe protein calorie malnutrition  Lymphoma  Acute on chronic respiratory failure with hypoxia  COVID-19 pneumonia  COPD  Stage II pressure injury    Goals of Care  · Level 3 code status  · Family's goal has been to bring [place] home once medically stable  · They have number for [facility] hospice but were not considering starting hospice cares immediately  · I will touch base with them to follow up goals  · Left message with Lara  Awaiting call back  Plan  Symptom Management    Insomnia:  Continue mirtazapine    Appetite stimulation:  Continue mirtazapine    Will defer other symptom management to the primary team at this time  24 History  Chart reviewed before visit  Tricia santos remains on 4 L nasal cannula  She intermittently refuses to work with PT/OT  She is frail and unable to complete much physical activity  Decisional apparatus:  Patient is not competent on exam today  If competence is lost, patient's substitute decision maker would default to daughters by [place] Act 169      Review of Systems:   Review of Systems   Unable to perform ROS: acuity of condition       Medications    Current Facility-Administered Medications:     acetaminophen (TYLENOL) tablet 650 mg, 650 mg, Oral, Q6H PRN, Jo-nAn VELASCO PA-C, 650 mg at 02/21/22 0341    atorvastatin (LIPITOR) tablet 20 mg, 20 mg, Oral, Daily With Dinner, ASHELY BarnesC, 20 mg at 02/28/22 1849    Baricitinib (OLUMIANT) (COVID EUA) tablet 4 mg, 4 mg, Oral, Q24H, Mike Cabezas MD, 4 mg at 02/28/22 1849    dexamethasone (DECADRON) injection 6 mg, 6 mg, Intravenous, Q24H, Jo-Ann VELASCO PA-C, 6 mg at 02/28/22 1849    ferrous sulfate tablet 325 mg, 325 mg, Oral, Daily With Breakfast, Jo-Ann VELASCO PA-C, 325 mg at 03/01/22 1023    heparin (porcine) 25,000 units in 0 45% NaCl 250 mL infusion (premix), 3-20 Units/kg/hr (Order-Specific), Intravenous, Titrated, Reymundo Nichols MD, Last Rate: 6 4 mL/hr at 02/28/22 0925, 16 Units/kg/hr at 02/28/22 0925    heparin (porcine) injection 1,200 Units, 1,200 Units, Intravenous, Q1H PRN, Reymundo Nichols MD, 1,200 Units at 02/27/22 0648    heparin (porcine) injection 2,400 Units, 2,400 Units, Intravenous, Q1H PRN, Reymundo Nichols MD, 2,400 Units at 02/22/22 1657    levothyroxine tablet 25 mcg, 25 mcg, Oral, Early Morning, Jo-Ann [place]wir[place] V, PA-C, 25 mcg at 03/01/22 0612    mirtazapine (REMERON) tablet 15 mg, 15 mg, Oral, HS, Jo-Ann [place] V, PA-C, 15 mg at 02/28/22 2302    senna-docusate sodium (SENOKOT S) 8 6-50 mg per tablet 1 tablet, 1 tablet, Oral, Daily, Jo-Ann [place] V, PA-C, 1 tablet at 03/01/22 1023    sertraline (ZOLOFT) tablet 25 mg, 25 mg, Oral, Daily, Jo-Ann [place] V, PA-C, 25 mg at 03/01/22 1023    Objective  /65   Pulse (!) 107   Temp 97 8 °F (36 6 °C) (Oral)   Resp 18   Ht 4' 11" (1 499 m)   Wt 44 kg (97 lb)   SpO2 95%   BMI 19 59 kg/m²     Physical Exam:   Physical Exam  Vitals and nursing note reviewed  Constitutional:       General: She is sleeping  She is not in acute distress  Appearance: She is well-developed  She is cachectic  She is not ill-appearing  Interventions: Nasal cannula in place  HENT:      Head: Normocephalic and atraumatic  Cardiovascular:      Rate and Rhythm: Normal rate and regular rhythm  Pulmonary:      Effort: Pulmonary effort is normal  No respiratory distress  Musculoskeletal:      Cervical back: Neck supple  Skin:     General: Skin is warm and dry  Lab Results:   I have personally reviewed pertinent labs  , CBC:   Lab Results   Component Value Date    WBC 16 24 (H) 03/01/2022    HGB 8 2 (L) 03/01/2022    HCT 25 8 (L) 03/01/2022    MCV 79 (L) 03/01/2022     (H) 03/01/2022    MCH 25 1 (L) 03/01/2022    MCHC 31 8 03/01/2022    RDW 17 3 (H) 03/01/2022    MPV 9 4 03/01/2022   , BMP:  Lab Results   Component Value Date    SODIUM 134 (L) 03/01/2022    K 4 5 03/01/2022     03/01/2022    CO2 30 03/01/2022    BUN 16 03/01/2022    CREATININE 0 40 (L) 03/01/2022    GLUC 95 03/01/2022    CALCIUM 7 6 (L) 03/01/2022    AGAP 4 03/01/2022    EGFR 93 03/01/2022     Imaging Studies: I have personally reviewed pertinent reports  EKG, Pathology, and Other Studies: I have personally reviewed pertinent reports  Counseling / Coordination of Care  Total floor / unit time spent today 25 minutes  Greater than 50% of total time was spent with the patient and / or family counseling and / or coordinating of care  A description of the counseling / coordination of care: Chart reviewed, provided medical updates, determined goals of care, discussed palliative care and symptom management, determined competency and POA/HCA, determined social/family support, provided psychosocial support  Reviewed with TRAVIS team, RN and CM  MAGADLENA Rdz  Palliative & Supportive Care    Portions of this document may have been created using dictation software and as such some "sound alike" terms may have been generated by the system  Do not hesitate to contact me with any questions or clarifications

## 2022-03-01 NOTE — PROGRESS NOTES
[place]  Progress Note - Marixa Ward 1934, 80 y o  female MRN: [id number]  Unit/Bed#: -[facility] Encounter: [id number]  Primary Care Provider: Srinath Ochoa DO   Date and time admitted to hospital: 2/20/2022 11:41 AM    Pressure injury of sacral region, stage 2 [facility])  Assessment & Plan  Patient has stage II sacral pressure ulcer present on admission without signs of cellulitis  Continue local care, frequent repositioning  Acute on chronic respiratory failure with hypoxia [facility])  Assessment & Plan  Patient had acute on chronic hypoxic respiratory failure due to COVID pneumonia as evidenced by respiratory more than 25 and hypoxia requiring treatment with oxygen via mid flow at 10 liters/minute  She uses 2 liters/minute at home at night    Currently requiring 4 L of supplemental oxygen  Wean as tolerated    Sepsis [facility])  Assessment & Plan  Patient met criteria for sepsis admission as evidenced by respiratory more than 20, heart rate more than 100, leukocytosis more than 10K due to COVID pneumonia  Blood cultures showed no growth so far    Severe protein-calorie malnutrition (HCC)  Assessment & Plan  Malnutrition Findings:   Adult Malnutrition type: Chronic illness  Adult Degree of Malnutrition: Other severe protein calorie malnutrition (Pt presents with severe protein calorie malnutrition as evidenced by sunken orbitals, prominent clavicel bone and temporal scooping  Treat with Regular diet and Ensure QID )    BMI Findings: Body mass index is 19 59 kg/m²  · Dietary supplemenets     Patient has severe protein calorie malnutrition  She has diffuse atrophy of the muscles of the extremities and temporal atrophy  Continue chocolate Ensure nutritional supplements    Lymphoma ([address] )  Assessment & Plan  Patient's history of lymphoma  Patient has increase in her leukocyte count today likely due to steroids  She has anemia without signs of GI  bleeding      Will monitor blood counts    COPD (chronic obstructive pulmonary disease) [facility])  Assessment & Plan  Patient has chronic COPD  She had no COPD exacerbation to the hospital stay  Mixed hyperlipidemia  Assessment & Plan  · Continue Lipitor    * Pneumonia due to COVID-19 virus  Assessment & Plan  Patient was admitted with bilateral COVID pneumonia requiring oxygen via nasal cannula   Currently on 4 L of supplemental oxygen  Patient clinically looks better, more awake, less hard of hearing  Continue baricitinib, dexamethasone  She completed IV remdesivir  Continue IV heparin for DVT prevention  Encourage incentive spirometer use  Patient is refusing to get out of bed to chair  P T /OT re-evaluation  CRP is 44 2        Labs & Imaging: I have personally reviewed pertinent reports  VTE Prophylaxis: in place  Code Status:   Level 3 - DNAR and DNI    Patient Centered Rounds: I have performed bedside rounds with nursing staff today  Discussions with Specialists or Other Care Team Provider: CM    Education and Discussions with Family / Patient:  Left voicemail for daughter    Current Length of Stay: 9 day(s)    Current Patient Status: Inpatient   Certification Statement: The patient will continue to require additional inpatient hospital stay due to see my assessment and plan  Subjective:   Patient is seen and examined at bedside  Patient denies any complaints  Is on 4 L of supplemental oxygen  Refuses to get out of bed to chair  Afebrile  All other ROS are negative  Objective:    Vitals: Blood pressure 109/65, pulse (!) 107, temperature 97 8 °F (36 6 °C), temperature source Oral, resp  rate 18, height 4' 11" (1 499 m), weight 44 kg (97 lb), SpO2 95 %  ,Body mass index is 19 59 kg/m²    SPO2 RA Rest      ED to Hosp-Admission (Current) from 2/20/2022 in [address] Med Surg Unit   SpO2 95 %   SpO2 Activity At Rest   O2 Device Nasal cannula   O2 Flow Rate 4 L/min I&O:     Intake/Output Summary (Last 24 hours) at 3/1/2022 1039  Last data filed at 2/28/2022 1343  Gross per 24 hour   Intake --   Output 400 ml   Net -400 ml       Physical Exam:    General- Alert, lying comfortably in bed  Not in any acute distress  Neck- Supple, No JVD  CVS- regular, S1 and S2 normal  Chest- Bilateral Air entry, decreased at bases  Abdomen- soft, nontender, not distended, no guarding or rigidity, BS+  Extremities-  No pedal edema, No calf tenderness  CNS-   Alert, awake and orientedx3  No focal deficits present  Invasive Devices  Report    Peripheral Intravenous Line            Peripheral IV 02/26/22 Left Antecubital 3 days    Peripheral IV 02/27/22 Right;Ventral (anterior) Forearm 2 days          Drain            External Urinary Catheter 2 days                      Social History  reviewed  History reviewed  No pertinent family history   reviewed    Meds:  Current Facility-Administered Medications   Medication Dose Route Frequency Provider Last Rate Last Admin    acetaminophen (TYLENOL) tablet 650 mg  650 mg Oral Q6H PRN RAÚL Barnes-C   650 mg at 02/21/22 0341    atorvastatin (LIPITOR) tablet 20 mg  20 mg Oral Daily With [employer] KRISTA PA-C   20 mg at 02/28/22 1849    Baricitinib (OLUMIANT) (COVID EUA) tablet 4 mg  4 mg Oral Q24H Luanne Delaney MD   4 mg at 02/28/22 1849    dexamethasone (DECADRON) injection 6 mg  6 mg Intravenous Q24H Jo-Ann Yañez V PA-C   6 mg at 02/28/22 1849    ferrous sulfate tablet 325 mg  325 mg Oral Daily With [employer] KRISTA PA-C   325 mg at 03/01/22 1023    heparin (porcine) 25,000 units in 0 45% NaCl 250 mL infusion (premix)  3-20 Units/kg/hr (Order-Specific) Intravenous Titrated Deonte Drake MD 6 4 mL/hr at 02/28/22 0925 16 Units/kg/hr at 02/28/22 0925    heparin (porcine) injection 1,200 Units  1,200 Units Intravenous Q1H PRN Deonte Drake MD   1,200 Units at 02/27/22 0648    heparin (porcine) injection 2,400 Units  2,400 Units Intravenous Q1H PRN Philippe Shepherd MD   2,400 Units at 02/22/22 1657    levothyroxine tablet 25 mcg  25 mcg Oral Early Morning Jo-Ann Yañez V PA-C   25 mcg at 03/01/22 0612    mirtazapine (REMERON) tablet 15 mg  15 mg Oral HS Jo-Ann Yañez V, PA-C   15 mg at 02/28/22 2302    senna-docusate sodium (SENOKOT S) 8 6-50 mg per tablet 1 tablet  1 tablet Oral Daily Jo-Ann Yañez V PA-C   1 tablet at 03/01/22 1023    sertraline (ZOLOFT) tablet 25 mg  25 mg Oral Daily Jo-Ann Yañez V, PA-C   25 mg at 03/01/22 1023      Medications Prior to Admission   Medication    Albuterol Sulfate 108 (90 Base) MCG/ACT AEPB    atorvastatin (LIPITOR) 20 mg tablet    Calcium Carbonate-Vitamin D 600-200 MG-UNIT TABS    co-enzyme Q-10 30 MG capsule    ferrous sulfate 325 (65 Fe) mg tablet    levothyroxine 25 mcg tablet    mirtazapine (REMERON) 7 5 MG tablet    Multiple Vitamin (multivitamin) capsule    senna-docusate sodium (SENOKOT S) 8 6-50 mg per tablet    sertraline (ZOLOFT) 25 mg tablet       Labs:  Results from last 7 days   Lab Units 03/01/22  0613 02/28/22  0353 02/27/22  0443 02/25/22  0450 02/24/22  0439   WBC Thousand/uL 16 24* 18 29* 18 34*   < > 20 88*   HEMOGLOBIN g/dL 8 2* 7 7* 8 0*   < > 7 1*   HEMATOCRIT % 25 8* 26 0* 26 3*   < > 22 9*   PLATELETS Thousands/uL 559* 626* 573*   < > 598*   LYMPHO PCT % 63* 60*  --   --  79*   MONO PCT % 5 4  --   --  2*   EOS PCT % 0 0  --   --  0    < > = values in this interval not displayed       Results from last 7 days   Lab Units 03/01/22  0614 02/27/22  0443 02/25/22  0450 02/24/22  0439 02/24/22  0439   POTASSIUM mmol/L 4 5 4 6 4 3   < > 3 9   CHLORIDE mmol/L 100 100 106   < > 111*   CO2 mmol/L 30 31 30   < > 29   BUN mg/dL 16 17 20   < > 23   CREATININE mg/dL 0 40* 0 38* 0 42*   < > 0 44*   CALCIUM mg/dL 7 6* 7 9* 7 9*   < > 7 7*   ALK PHOS U/L 124*  --  129*  --  125*   ALT U/L 50  --  67  --  65   AST U/L 41  --  59*  --  59*    < > = values in this interval not displayed  Lab Results   Component Value Date    TROPONINI <0 02 10/07/2021         Lab Results   Component Value Date    BLOODCX No Growth After 5 Days  02/20/2022    BLOODCX No Growth After 5 Days  02/20/2022    BLOODCX No Growth After 5 Days  11/08/2021    BLOODCX No Growth After 5 Days  11/08/2021         Imaging:  Results for orders placed during the hospital encounter of 02/20/22    XR chest portable    Narrative  CHEST    INDICATION:   covid pneumonia, increasing oxygen requirement  Patient has confirmed COVID-19  Positive on 2/20/2022  COMPARISON:  Chest radiograph from 2/20/2020  EXAM PERFORMED/VIEWS:  XR CHEST PORTABLE      FINDINGS:    Cardiomediastinal silhouette appears unremarkable  Worsening bilateral ground glass opacity, greater on the right  No effusion or pneumothorax  Moderate curvature of the spine  Impression  Worsening bilateral groundglass opacity, greater on the right, due to Covid 19  Workstation performed: [id number]    Results for orders placed during the hospital encounter of 11/08/21    XR chest pa & lateral    Narrative  CHEST    INDICATION:   Pneumonia  COMPARISON:  Compared with 11/8/2021    EXAM PERFORMED/VIEWS:  XR CHEST PA & LATERAL      FINDINGS:    Cardiomediastinal silhouette appears unremarkable  Right lung base opacity demonstrates improvement with slight decrease in the left lung base opacity  Poor inspiration  Unchanged blunted left costophrenic angle  Osseous structures appear within normal limits for patient age  Impression  Improving bibasilar infiltrates with small left effusion  Continued follow-up to resolution to exclude associated mass              Workstation performed: [id number]      Last 24 Hours Medication List:   Current Facility-Administered Medications   Medication Dose Route Frequency Provider Last Rate    acetaminophen  650 mg Oral Q6H PRN Malka VELASCO PA-C      atorvastatin  20 mg Oral Daily With [employer] PATI VELASCO      Baricitinib  4 mg Oral Q24H Brady Messina MD      dexamethasone  6 mg Intravenous Q24H [facility] PATI VELASCO      ferrous sulfate  325 mg Oral Daily With Breakfast [facility] PATI VELASCO      heparin (porcine)  3-20 Units/kg/hr (Order-Specific) Intravenous Titrated Daniel Ghosh MD 16 Units/kg/hr (02/28/22 0925)    heparin (porcine)  1,200 Units Intravenous Q1H PRN Daniel Ghosh MD      heparin (porcine)  2,400 Units Intravenous Q1H PRN Daniel Ghosh MD      levothyroxine  25 mcg Oral Early Morning [facility] PATI VELASCO      mirtazapine  15 mg Oral HS Jo-Ann VELASCO PA-C      senna-docusate sodium  1 tablet Oral Daily Jo-Ann VELASCO PA-C      sertraline  25 mg Oral Daily Jo-Ann VELASCO PA-C          Today, Patient Was Seen By: Freda Wall MD    ** Please Note: Dictation voice to text software may have been used in the creation of this document   **

## 2022-03-01 NOTE — ASSESSMENT & PLAN NOTE
Patient was admitted with bilateral COVID pneumonia requiring oxygen via nasal cannula   Currently on 4 L of supplemental oxygen  Patient clinically looks better, more awake, less hard of hearing  Continue baricitinib, dexamethasone  She completed IV remdesivir    Continue IV heparin for DVT prevention  Encourage incentive spirometer use  Patient is refusing to get out of bed to chair  P T /OT re-evaluation  CRP is 44 2

## 2022-03-02 NOTE — CASE MANAGEMENT
Case Management Discharge Planning Note    Patient name Manohar Door  Location /-17 MRN [id number]  : 1934 Date 3/2/2022       Current Admission Date: 2022  Current Admission Diagnosis:Pneumonia due to COVID-19 virus   Patient Active Problem List    Diagnosis Date Noted    Pressure injury of sacral region, stage 2 ([address] ) 2022    Sepsis ([address] ) 2022    Acute on chronic [facility] failure [facility] ) 20[facility]    Pneumonia due to COVID-19 virus 2022    Elevated brain natriuretic peptide (BNP) level 2021    Anemia 10/08/2021    Severe protein-calorie malnutrition ([address] ) 10/08/2021    Depression 10/07/2021    Hypothyroidism 10/07/2021    Mixed hyperlipidemia 10/07/2021    COPD (chronic obstructive pulmonary disease) ([facility] ) 10/07/2021    Chronic respiratory failure with hypoxia ([facility] ) 10/07/2021    Hyponatremia 10/07/2021    Malnutrition ([facility] ) 10/07/2021    Lymphoma ([facility] ) 10/07/2021      LOS (days): 10  Geometric Mean LOS (GMLOS) (days): 4 80  Days to GMLOS:-5 1     OBJECTIVE:  Risk of Unplanned Readmission Score: 26     Current admission status: Inpatient   Preferred Pharmacy:   [address], [address] [facility] [address] [facility]  [address] [facility]  [address] [facility] [address]  Phone: [phone] Fax: [phone]    Primary Care Provider: Roz Roberts DO    Primary Insurance: Maia Egan [facility]  Secondary Insurance:     DISCHARGE DETAILS:    Additional Comments: CM notified by Palliative Care that Pt's family(Kelli and Lara) is choosing hospice at home  Call placed to Pt's dtr Lara([phone]), left message  Call placed to Pt's granddtr(Kelli: [phone]), confirmed that plan is for Pt to return home with hospice  Pt's granddtr is choosing [employer]  Call received from Pt's dtr(Lara), and Lara confirmed plan for home with hospice and Pt's granddtr(Kelli) is point of contact since Pt will be going to [address] [place] [address] [place]   Referral sent to [facility] via [facility]  CM to follow

## 2022-03-02 NOTE — CASE MANAGEMENT
Case Management Discharge Planning Note    Patient name Corinne Fermin  Location /-33 MRN [id number]  : 1934 Date 3/2/2022       Current Admission Date: 2022  Current Admission Diagnosis:Pneumonia due to COVID-19 virus   Patient Active Problem List    Diagnosis Date Noted    Pressure injury of sacral region, stage 2 ([facility] ) 2022    Sepsis ([facility] ) 2022    Acute on chronic respiratory failure with hypoxia ([facility] ) 2022    Pneumonia due to COVID-19 virus 2022    Elevated brain natriuretic peptide (BNP) level 2021    Anemia 10/08/2021    Severe protein-calorie malnutrition ([facility] ) 10/08/2021    Depression 10/07/2021    Hypothyroidism 10/07/2021    Mixed hyperlipidemia 10/07/2021    COPD (chronic obstructive pulmonary disease) ([facility] ) 10/07/2021    Chronic respiratory failure with hypoxia ([facility] ) 10/07/2021    Hyponatremia 10/07/2021    Malnutrition ([facility] ) 10/07/2021    [facility] 10/07/2021      LOS (days): 10  Geometric Mean LOS (GMLOS) (days): 4 80  Days to GMLOS:-5 3     OBJECTIVE:  Risk of Unplanned Readmission Score: 26     Current admission status: Inpatient   Preferred Pharmacy:   [address], [address] [facility] [address] [facility] [place]  [address] [facility] [place] [facility],[address]  [address] [facility] [address]  Phone: [phone] Fax: [phone]    Primary Care Provider: Rubin Tomas DO    Primary Insurance: Ema Wheeler [facility] REP  Secondary Insurance:     DISCHARGE DETAILS:  Additional Comments: [id number] [facility] liaison(Mildred) informed CM that she spoke with family and  that DME will be ordered today to be delivered to Pt's granddtr house tomorrow morning  Call placed to Pt's granddtr(Kelli:[phone]), Pt's granddtr aware that DME will be delivered tomorrow morning to house  Pt's granddtr confirmed home address, [address] and Pt will be on  floor  Pt's granddtr reports that she will be at work but Pt's caregiver(Nirmala:[phone]) will be at the home to receive Pt   Referral sent to [facility] via [facility]  [facility] BLS to transport Pt home on 3/3/2022 at 12pm  Pt's nurse(Alicia), TRAVIS, hospice liaison and Pt's granddtr informed of transport time on 3/3/22

## 2022-03-02 NOTE — PROGRESS NOTES
[place]  Progress Note - Anastacia Givens 1934, 80 y o  female MRN: [id number]  Unit/Bed#: -[facility] Encounter: [id number]  Primary Care Provider: Clary Live DO   Date and time admitted to hospital: 2/20/2022 11:41 AM    Pressure injury of sacral region, stage 2 [facility])  Assessment & Plan  Patient has stage II sacral pressure ulcer present on admission without signs of cellulitis  Continue local care, frequent repositioning  Acute on chronic respiratory failure with hypoxia [facility])  Assessment & Plan  Patient had acute on chronic hypoxic respiratory failure due to COVID pneumonia as evidenced by respiratory more than 25 and hypoxia requiring treatment with oxygen via mid flow at 10 liters/minute  She uses 2 liters/minute at home at night    Currently requiring 4 L of supplemental oxygen  Wean as tolerated    Sepsis [facility])  Assessment & Plan  Patient met criteria for sepsis admission as evidenced by respiratory more than 20, heart rate more than 100, leukocytosis more than 10K due to COVID pneumonia  Blood cultures showed no growth so far    Severe protein-calorie malnutrition (HCC)  Assessment & Plan  Malnutrition Findings:   Adult Malnutrition type: Chronic illness  Adult Degree of Malnutrition: Other severe protein calorie malnutrition (Pt presents with severe protein calorie malnutrition as evidenced by sunken orbitals, prominent clavicel bone and temporal scooping  Treat with Regular diet and Ensure QID )    BMI Findings: Body mass index is 19 59 kg/m²  · Dietary supplemenets     Patient has severe protein calorie malnutrition  She has diffuse atrophy of the muscles of the extremities and temporal atrophy  Continue chocolate Ensure nutritional supplements    Lymphoma ([address] )  Assessment & Plan  Patient's history of lymphoma  Patient has increase in her leukocyte count today likely due to steroids  She has anemia without signs of GI  bleeding      Will monitor blood counts    COPD (chronic obstructive pulmonary disease) [facility])  Assessment & Plan  Patient has chronic COPD  She had no COPD exacerbation to the hospital stay  Mixed hyperlipidemia  Assessment & Plan  · Continue Lipitor    * Pneumonia due to COVID-19 virus  Assessment & Plan  Patient was admitted with bilateral COVID pneumonia requiring oxygen via nasal cannula   Currently on 4 L of supplemental oxygen  Patient clinically looks better, more awake, less hard of hearing  Continue baricitinib  Completed 10 day course of dexamethasone  She completed IV remdesivir  Continue IV heparin for DVT prevention  Encourage incentive spirometer use  Patient is refusing to get out of bed to chair  P T /OT re-evaluation  CRP is 44 2  Palliative care is on board and spoke with daughter and they are not interested in going to rehab  Daughter is going to discuss with family regarding transition to home hospice care        Labs & Imaging: I have personally reviewed pertinent reports  VTE Prophylaxis: in place  Code Status:   Level 3 - DNAR and DNI    Patient Centered Rounds: I have performed bedside rounds with nursing staff today  Discussions with Specialists or Other Care Team Provider: CM    Education and Discussions with Family / Patient:  Left message for daughter    Current Length of Stay: 10 day(s)    Current Patient Status: Inpatient   Certification Statement: The patient will continue to require additional inpatient hospital stay due to see my assessment and plan  Subjective:   Patient is seen and examined at bedside  Patient denies any new complaints  On 4 L of supplemental oxygen  Afebrile  All other ROS are negative  Objective:    Vitals: Blood pressure 117/50, pulse 85, temperature (!) 97 3 °F (36 3 °C), resp  rate 18, height 4' 11" (1 499 m), weight 44 kg (97 lb), SpO2 96 %  ,Body mass index is 19 59 kg/m²    SPO2 RA Rest      ED to Hosp-Admission (Current) from 2/20/2022 in [facility]  [facility] Med [facility]urg Unit   SpO2 96 %   SpO2 Activity At Rest   O2 Device Nasal cannula   O2 Flow Rate 4 L/min        I&O:     Intake/Output Summary (Last 24 hours) at 3/2/2022 1112  Last data filed at 3/2/2022 0801  Gross per 24 hour   Intake 600 ml   Output --   Net 600 ml       Physical Exam:    General- Alert, lying comfortably in bed  Not in any acute distress  Neck- Supple, No JVD  CVS- regular, S1 and S2 normal  Chest- Bilateral Air entry, decreased at bases  Abdomen- soft, nontender, not distended, no guarding or rigidity, BS+  Extremities-  No pedal edema, No calf tenderness  CNS-   Alert, awake  No focal deficits present  Invasive Devices  Report    Peripheral Intravenous Line            Peripheral IV 02/26/22 Left Antecubital 4 days    Peripheral IV 02/27/22 Right;Ventral (anterior) Forearm 3 days                      Social History  reviewed  History reviewed  No pertinent family history   reviewed    Meds:  Current Facility-Administered Medications   Medication Dose Route Frequency Provider Last Rate Last Admin    acetaminophen (TYLENOL) tablet 650 mg  650 mg Oral Q6H PRN Jo-Ann Yañez V, PA-C   650 mg at 02/21/22 0341    atorvastatin (LIPITOR) tablet 20 mg  20 mg Oral Daily With [employer] V, PA-C   20 mg at 03/01/22 1751    Baricitinib (OLUMIANT) (COVID EUA) tablet 4 mg  4 mg Oral Q24H Bri Maxwell MD   4 mg at 03/01/22 1750    ferrous sulfate tablet 325 mg  325 mg Oral Daily With PASSNFLY V, PA-C   325 mg at 03/02/22 0826    heparin (porcine) 25,000 units in 0 45% NaCl 250 mL infusion (premix)  3-20 Units/kg/hr (Order-Specific) Intravenous Titrated Claudia Louis MD 6 4 mL/hr at 03/02/22 0420 16 Units/kg/hr at 03/02/22 0420    heparin (porcine) injection 1,200 Units  1,200 Units Intravenous Q1H PRN Claudia Louis MD   1,200 Units at 03/01/22 1913    heparin (porcine) injection 2,400 Units  2,400 Units Intravenous Q1H PRN Charon Dancer Reiiner Thomas MD   2,400 Units at 02/22/22 1657    levothyroxine tablet 25 mcg  25 mcg Oral Early Morning Jo-Ann Yañez V PA-C   25 mcg at 03/02/22 0522    mirtazapine (REMERON) tablet 15 mg  15 mg Oral HS Jo-Ann Yañez V, PA-C   15 mg at 03/01/22 2305    senna-docusate sodium (SENOKOT S) 8 6-50 mg per tablet 1 tablet  1 tablet Oral Daily RAÚL Barnes-C   1 tablet at 03/02/22 0827    sertraline (ZOLOFT) tablet 25 mg  25 mg Oral Daily Jo-Ann Yañez V, PA-C   25 mg at 03/02/22 6242      Medications Prior to Admission   Medication    Albuterol Sulfate 108 (90 Base) MCG/ACT AEPB    atorvastatin (LIPITOR) 20 mg tablet    Calcium Carbonate-Vitamin D 600-200 MG-UNIT TABS    co-enzyme Q-10 30 MG capsule    ferrous sulfate 325 (65 Fe) mg tablet    levothyroxine 25 mcg tablet    mirtazapine (REMERON) 7 5 MG tablet    Multiple Vitamin (multivitamin) capsule    senna-docusate sodium (SENOKOT S) 8 6-50 mg per tablet    sertraline (ZOLOFT) 25 mg tablet       Labs:  Results from last 7 days   Lab Units 03/02/22  0202 03/01/22  0613 02/28/22  0353   WBC Thousand/uL 13 45* 16 24* 18 29*   HEMOGLOBIN g/dL 7 7* 8 2* 7 7*   HEMATOCRIT % 25 7* 25 8* 26 0*   PLATELETS Thousands/uL 482* 559* 626*   LYMPHO PCT % 64* 63* 60*   MONO PCT % 0* 5 4   EOS PCT % 0 0 0     Results from last 7 days   Lab Units 03/02/22  0202 03/01/22  0614 02/27/22  0443 02/25/22  0450 02/25/22  0450 02/24/22  0439 02/24/22  0439   POTASSIUM mmol/L 4 7 4 5 4 6   < > 4 3   < > 3 9   CHLORIDE mmol/L 98* 100 100   < > 106   < > 111*   CO2 mmol/L 30 30 31   < > 30   < > 29   BUN mg/dL 16 16 17   < > 20   < > 23   CREATININE mg/dL 0 42* 0 40* 0 38*   < > 0 42*   < > 0 44*   CALCIUM mg/dL 7 4* 7 6* 7 9*   < > 7 9*   < > 7 7*   ALK PHOS U/L  --  124*  --   --  129*  --  125*   ALT U/L  --  50  --   --  67  --  65   AST U/L  --  41  --   --  59*  --  59*    < > = values in this interval not displayed       Lab Results   Component Value Date    TROPONINI <0 02 10/07/2021         Lab Results   Component Value Date    BLOODCX No Growth After 5 Days  02/20/2022    BLOODCX No Growth After 5 Days  02/20/2022    BLOODCX No Growth After 5 Days  11/08/2021    BLOODCX No Growth After 5 Days  11/08/2021         Imaging:  Results for orders placed during the hospital encounter of 02/20/22    XR chest portable    Narrative  CHEST    INDICATION:   covid pneumonia, increasing oxygen requirement  Patient has confirmed COVID-19  Positive on 2/20/2022  COMPARISON:  Chest radiograph from 2/20/2020  EXAM PERFORMED/VIEWS:  XR CHEST PORTABLE      FINDINGS:    Cardiomediastinal silhouette appears unremarkable  Worsening bilateral ground glass opacity, greater on the right  No effusion or pneumothorax  Moderate curvature of the spine  Impression  Worsening bilateral groundglass opacity, greater on the right, due to Covid 19  Workstation performed: [id number]    Results for orders placed during the hospital encounter of 11/08/21    XR chest pa & lateral    Narrative  CHEST    INDICATION:   Pneumonia  COMPARISON:  Compared with 11/8/2021    EXAM PERFORMED/VIEWS:  XR CHEST PA & LATERAL      FINDINGS:    Cardiomediastinal silhouette appears unremarkable  Right lung base opacity demonstrates improvement with slight decrease in the left lung base opacity  Poor inspiration  Unchanged blunted left costophrenic angle  Osseous structures appear within normal limits for patient age  Impression  Improving bibasilar infiltrates with small left effusion  Continued follow-up to resolution to exclude associated mass              Workstation performed: [id number]      Last 24 Hours Medication List:   Current Facility-Administered Medications   Medication Dose Route Frequency Provider Last Rate    acetaminophen  650 mg Oral Q6H PRN Patjulito VELASCO PA-C      atorvastatin  20 mg Oral Daily With [employer] APTI VELASCO      Baricitinib  4 mg Oral Q24H Norman Lobo MD      ferrous sulfate  325 mg Oral Daily With Breakfast Srinivas VELASCO PA-C      heparin (porcine)  3-20 Units/kg/hr (Order-Specific) Intravenous Titrated Chery Vega, MD 16 Units/kg/hr (03/02/22 0420)    heparin (porcine)  1,200 Units Intravenous Q1H PRN Chery Vega, MD      heparin (porcine)  2,400 Units Intravenous Q1H PRN Chery Vega, MD      levothyroxine  25 mcg Oral Early Morning Srinivas VELASCO PA-C      mirtazapine  15 mg Oral HS Jo-Ann VELASCO PA-C      senna-docusate sodium  1 tablet Oral Daily Jo-Ann VELASCO PA-C      sertraline  25 mg Oral Daily Jo-Ann VELASCO PA-C          Today, Patient Was Seen By: Robin Louis MD    ** Please Note: Dictation voice to text software may have been used in the creation of this document   **

## 2022-03-02 NOTE — HOSPICE NOTE
Hospice referral received and discussed same with granddaughter Francisco Gaffney and goals of care are consistent with hospice care  Pt is approved for home hospice with a start of care for Thursday  DME , bed and oxygen, ordered for delivery tomorrow am   Please arrange for transport around 1 pm tomorrow

## 2022-03-02 NOTE — PROGRESS NOTES
Progress Note - Palliative & Supportive Care  Andriy Salazar  80 y o   female  /-01   MRN: [id number]  Encounter: [id number]     Assessment/Problems Actively Addressed this Visit:  Goals of care counseling  Lymphoma  Severe protein calorie malnutrition  Sepsis, resolved   Acute on chronic respiratory failure with hypoxia  COVID-19 pneumonia  COPD  Stage II pressure injury    Goals of Care  · Level 3 code status  · I spoke with Lara and Bernardino Christine about Parvin's frailty  · Family wants Vinicius Zane home  "We don't want her coming back to hospital "   · They are agreeable to initiating home hospice  · Referral sent  Updated CM and SLIM    Plan  Symptom Management  Will defer symptom management to the primary team at this time  24 History  Chart reviewed before visit  Parvin remains on 4L NC  SLP and PT note her frailty, inability to move  I spoke with Lara and Bernardino Christine about this  The are adamant that they would like Vinicius Zane home  They desired time to talk about it with Earle Byers  I called back after they had discussed decision  They decided to proceed with home hospice  Bernardino Christine is agreeable to being point of contact moving forward however if POA as needed, should reach out to Earle Byers or Lara         Review of Systems:   Review of Systems   Unable to perform ROS: dementia     Medications    Current Facility-Administered Medications:     acetaminophen (TYLENOL) tablet 650 mg, 650 mg, Oral, Q6H PRN, Jo-Ann VELASCO PA-C, 650 mg at 02/21/22 0341    atorvastatin (LIPITOR) tablet 20 mg, 20 mg, Oral, Daily With Dinner, Jo-Ann VELASCO PA-C, 20 mg at 03/01/22 1751    Baricitinib (OLUMIANT) (COVID EUA) tablet 4 mg, 4 mg, Oral, Q24H, Felix Galeana MD, 4 mg at 03/01/22 1750    ferrous sulfate tablet 325 mg, 325 mg, Oral, Daily With Breakfast, Jo-Ann VELASCO PA-C, 325 mg at 03/01/22 1023    heparin (porcine) 25,000 units in 0 45% NaCl 250 mL infusion (premix), 3-20 Units/kg/hr (Order-Specific), Intravenous, Titrated, Tony Hyman MD, Last Rate: 6 4 mL/hr at 03/02/22 0420, 16 Units/kg/hr at 03/02/22 0420    heparin (porcine) injection 1,200 Units, 1,200 Units, Intravenous, Q1H PRN, Tony Hyman MD, 1,200 Units at 03/01/22 1913    heparin (porcine) injection 2,400 Units, 2,400 Units, Intravenous, Q1H PRN, Tony Hyman MD, 2,400 Units at 02/22/22 1657    levothyroxine tablet 25 mcg, 25 mcg, Oral, Early Morning, Jo-Ann [place] V, PA-C, 25 mcg at 03/02/22 0522    mirtazapine (REMERON) tablet 15 mg, 15 mg, Oral, HS, Jo-Ann [place] V, PA-C, 15 mg at 03/01/22 2305    senna-docusate sodium (SENOKOT S) 8 6-50 mg per tablet 1 tablet, 1 tablet, Oral, Daily, Jo-Ann [place] V, PA-C, 1 tablet at 03/01/22 1023    sertraline (ZOLOFT) tablet 25 mg, 25 mg, Oral, Daily, Jo-Ann [place] V, PA-C, 25 mg at 03/01/22 1023    Objective  /65   Pulse (!) 107   Temp 97 8 °F (36 6 °C) (Oral)   Resp 18   Ht 4' 11" (1 499 m)   Wt 44 kg (97 lb)   SpO2 95%   BMI 19 59 kg/m²     Physical Exam:   Physical Exam  Vitals and nursing note reviewed  Exam conducted with a chaperone present  Constitutional:       General: She is not in acute distress  Appearance: She is well-developed  She is cachectic  She is ill-appearing  Interventions: Nasal cannula in place  Comments: Looking around the room  Significant muscle wasting  HENT:      Head: Normocephalic and atraumatic  Eyes:      Conjunctiva/sclera: Conjunctivae normal    Pulmonary:      Effort: Pulmonary effort is normal  No respiratory distress  Abdominal:      Palpations: Abdomen is soft  Tenderness: There is no abdominal tenderness  Musculoskeletal:      Cervical back: Neck supple  Skin:     General: Skin is warm and dry  Coloration: Skin is pale  Neurological:      Mental Status: She is alert  Lab Results:   I have personally reviewed pertinent labs  , CBC:   Lab Results   Component Value Date    WBC 13 45 (H) 03/02/2022 HGB 7 7 (L) 03/02/2022    HCT 25 7 (L) 03/02/2022    MCV 84 03/02/2022     (H) 03/02/2022    MCH 25 2 (L) 03/02/2022    MCHC 30 0 (L) 03/02/2022    RDW 18 2 (H) 03/02/2022    MPV 9 4 03/02/2022   , BMP:  Lab Results   Component Value Date    SODIUM 130 (L) 03/02/2022    K 4 7 03/02/2022    CL 98 (L) 03/02/2022    CO2 30 03/02/2022    BUN 16 03/02/2022    CREATININE 0 42 (L) 03/02/2022    GLUC 129 03/02/2022    CALCIUM 7 4 (L) 03/02/2022    AGAP 2 (L) 03/02/2022    EGFR 92 03/02/2022     Imaging Studies: I have personally reviewed pertinent reports  EKG, Pathology, and Other Studies: I have personally reviewed pertinent reports  Counseling / Coordination of Care  Total floor / unit time spent today 60 minutes  Greater than 50% of total time was spent with the patient and / or family counseling and / or coordinating of care  A description of the counseling / coordination of care: Chart reviewed,  provided medical updates, determined goals of care, discussed palliative care and symptom management, discussed code status, discussed comfort care and hospice, determined competency and POA/HCA, determined social/family support, provided psychosocial support  Reviewed with [facility] team, RN and CM  NITISH KellerDEANA  Palliative & Supportive Care    Portions of this document may have been created using dictation software and as such some "sound alike" terms may have been generated by the system  Do not hesitate to contact me with any questions or clarifications

## 2022-03-02 NOTE — SPEECH THERAPY NOTE
Speech Language/Pathology    Speech/Language Pathology Progress Note    Patient Name: Juno Ibrahim  [id number]'[id number] Date: 3/2/2022     Problem List  Principal Problem:    Pneumonia due to COVID-19 virus  Active Problems:    Mixed hyperlipidemia    COPD (chronic obstructive pulmonary disease) ([facility] )    Lymphoma (HCC)    Severe protein-calorie malnutrition ([facility] )    Sepsis ([facility] )    Acute on chronic respiratory failure with hypoxia ([facility] )    Pressure injury of sacral region, stage 2 ([facility] )       Past Medical History  Past Medical History:   Diagnosis Date    Anemia     Blind right eye     COPD (chronic obstructive pulmonary disease) ([facility] )     COPD [facility]chronic obstructive [facility] disease) [facility]     Disease of thyroid gland     Hypothyroidism     TIA (transient ischemic attack)         Past Surgical History  Past Surgical History:   Procedure Laterality Date    HIP FRACTURE SURGERY Bilateral          Subjective:  Pt awake and alert, breakfast tray at bedside  Per RN, pt w/ minimal PO intake, primarily only drinking Ensure  RN denies overt s/s aspiration w/ observed intake  "I have no appetite"     Current Diet:  Regular w/ thin     Objective:  Pt seen for dx dysphagia tx f/u  Pt assessed w/ approx 6 oz thin liquid via straw  No difficulty w/ retrieval  Swallows appear timely  No overt s/s aspiration  Pt initially declined trials of all solild material 2/2 poor appetite  Pt eventually agreeable to a bite of hard cookie  Mastication and oral organization WFL  NO significant oral residue  No overt s/s aspiration  S/w pt regarding reason for poor intake (? 2/2 c/o dysphagia vs  Poor intake)  Pt denies dysphagia, odynophagia, globus sensation, and/or s/s aspiration, stating she "just has no appetite " Education provided on strategies to optimize swallow safety and s/s dysphagia/aspiration to notify her medical team of should they arise  Pt verbalized understanding and again denied any concerns for dysphagia   S/w provider regarding any potential concerns for dysphagia/aspiration  Physician denies concerns for aspiration at this time  Assessment:  Pt w/ overall low intake though no overt s/s oropharyngeal dysphagia w/ materials administered today  Pt denies dysphagia and/or s/s aspiration  Physician without ongoing concerns for dysphagia/aspiration       Plan/Recommendations:  Continue current diet  Encourage PO intake   Frequent and thorough oral care  No further ST needs, please re-consult if medically necessary

## 2022-03-02 NOTE — PLAN OF CARE
Problem: MOBILITY - ADULT  Goal: Maintain or return to baseline ADL function  Description: INTERVENTIONS:  -  Assess patient's ability to carry out ADLs; assess patient's baseline for ADL function and identify physical deficits which impact ability to perform ADLs (bathing, care of mouth/teeth, toileting, grooming, dressing, etc )  - Assess/evaluate cause of self-care deficits   - Assess range of motion  - Assess patient's mobility; develop plan if impaired  - Assess patient's need for assistive devices and provide as appropriate  - Encourage maximum independence but intervene and supervise when necessary  - Involve family in performance of ADLs  - Assess for home care needs following discharge   - Consider OT consult to assist with ADL evaluation and planning for discharge  - Provide patient education as appropriate  Outcome: Progressing  Goal: Maintains/Returns to pre admission functional level  Description: INTERVENTIONS:  - Perform BMAT or MOVE assessment daily    - Set and communicate daily mobility goal to care team and patient/family/caregiver  - Collaborate with rehabilitation services on mobility goals if consulted  - Perform Range of Motion 2 times a day  - Reposition patient every 2 hours    - Dangle patient 2 times a day  - Stand patient 2 times a day  - Ambulate patient 2 times a day  - Out of bed to chair 2 times a day   - Out of bed for meals 2 times a day  - Out of bed for toileting  - Record patient progress and toleration of activity level   Outcome: Progressing     Problem: Potential for Falls  Goal: Patient will remain free of falls  Description: INTERVENTIONS:  -  Assess patient's ability to carry out ADLs; assess patient's baseline for ADL function and identify physical deficits which impact ability to perform ADLs (bathing, care of mouth/teeth, toileting, grooming, dressing, etc )  - Assess/evaluate cause of self-care deficits   - Assess range of motion  - Assess patient's mobility; develop plan if impaired  - Assess patient's need for assistive devices and provide as appropriate  - Encourage maximum independence but intervene and supervise when necessary  - Involve family in performance of ADLs  - Assess for home care needs following discharge   - Consider OT consult to assist with ADL evaluation and planning for discharge  - Provide patient education as appropriate  Outcome: Progressing     Problem: PAIN - ADULT  Goal: Verbalizes/displays adequate comfort level or baseline comfort level  Description: Interventions:  - Encourage patient to monitor pain and request assistance  - Assess pain using appropriate pain scale  - Administer analgesics based on type and severity of pain and evaluate response  - Implement non-pharmacological measures as appropriate and evaluate response  - Consider cultural and social influences on pain and pain management  - Notify physician/advanced practitioner if interventions unsuccessful or patient reports new pain  Outcome: Progressing     Problem: INFECTION - ADULT  Goal: Absence or prevention of progression during hospitalization  Description: INTERVENTIONS:  - Assess and monitor for signs and symptoms of infection  - Monitor lab/diagnostic results  - Monitor all insertion sites, i e  indwelling lines, tubes, and drains  - Monitor endotracheal if appropriate and nasal secretions for changes in amount and color  - [place] appropriate cooling/warming therapies per order  - Administer medications as ordered  - Instruct and encourage patient and family to use good hand hygiene technique  - Identify and instruct in appropriate isolation precautions for identified infection/condition  Outcome: Progressing  Goal: Absence of fever/infection during neutropenic period  Description: INTERVENTIONS:  - Monitor WBC    Outcome: Progressing     Problem: SAFETY ADULT  Goal: Maintain or return to baseline ADL function  Description: INTERVENTIONS:  -  Assess patient's ability to carry out ADLs; assess patient's baseline for ADL function and identify physical deficits which impact ability to perform ADLs (bathing, care of mouth/teeth, toileting, grooming, dressing, etc )  - Assess/evaluate cause of self-care deficits   - Assess range of motion  - Assess patient's mobility; develop plan if impaired  - Assess patient's need for assistive devices and provide as appropriate  - Encourage maximum independence but intervene and supervise when necessary  - Involve family in performance of ADLs  - Assess for home care needs following discharge   - Consider OT consult to assist with ADL evaluation and planning for discharge  - Provide patient education as appropriate  Outcome: Progressing  Goal: Maintains/Returns to pre admission functional level  Description: INTERVENTIONS:  - Perform BMAT or MOVE assessment daily    - Set and communicate daily mobility goal to care team and patient/family/caregiver  - Collaborate with rehabilitation services on mobility goals if consulted  - Perform Range of Motion 2 times a day  - Reposition patient every 2 hours    - Dangle patient 2 times a day  - Stand patient 2 times a day  - Ambulate patient 2 times a day  - Out of bed to chair 2 times a day   - Out of bed for meals 2 times a day  - Out of bed for toileting  - Record patient progress and toleration of activity level   Outcome: Progressing  Goal: Patient will remain free of falls  Description: INTERVENTIONS:  -  Assess patient's ability to carry out ADLs; assess patient's baseline for ADL function and identify physical deficits which impact ability to perform ADLs (bathing, care of mouth/teeth, toileting, grooming, dressing, etc )  - Assess/evaluate cause of self-care deficits   - Assess range of motion  - Assess patient's mobility; develop plan if impaired  - Assess patient's need for assistive devices and provide as appropriate  - Encourage maximum independence but intervene and supervise when necessary  - Involve family in performance of ADLs  - Assess for home care needs following discharge   - Consider OT consult to assist with ADL evaluation and planning for discharge  - Provide patient education as appropriate  Outcome: Progressing     Problem: DISCHARGE PLANNING  Goal: Discharge to home or other facility with appropriate resources  Description: INTERVENTIONS:  - Identify barriers to discharge w/patient and caregiver  - Arrange for needed discharge resources and transportation as appropriate  - Identify discharge learning needs (meds, wound care, etc )  - Arrange for interpretive services to assist at discharge as needed  - Refer to Case Management Department for coordinating discharge planning if the patient needs post-hospital services based on physician/advanced practitioner order or complex needs related to functional status, cognitive ability, or social support system  Outcome: Progressing     Problem: Knowledge Deficit  Goal: Patient/family/caregiver demonstrates understanding of disease process, treatment plan, medications, and discharge instructions  Description: Complete learning assessment and assess knowledge base    Interventions:  - Provide teaching at level of understanding  - Provide teaching via preferred learning methods  Outcome: Progressing     Problem: Prexisting or High Potential for Compromised Skin Integrity  Goal: Skin integrity is maintained or improved  Description: INTERVENTIONS:  - Identify patients at risk for skin breakdown  - Assess and monitor skin integrity  - Assess and monitor nutrition and hydration status  - Monitor labs   - Assess for incontinence   - Turn and reposition patient  - Assist with mobility/ambulation  - Relieve pressure over bony prominences  - Avoid friction and shearing  - Provide appropriate hygiene as needed including keeping skin clean and dry  - Evaluate need for skin moisturizer/barrier cream  - Collaborate with interdisciplinary team   - Patient/family teaching  - Consider wound care consult   Outcome: Progressing     Problem: Nutrition/Hydration-ADULT  Goal: Nutrient/Hydration intake appropriate for improving, restoring or maintaining nutritional needs  Description: Monitor and assess patient's nutrition/hydration status for malnutrition  Collaborate with interdisciplinary team and initiate plan and interventions as ordered  Monitor patient's weight and dietary intake as ordered or per policy  Utilize nutrition screening tool and intervene as necessary  Determine patient's food preferences and provide high-protein, high-caloric foods as appropriate       INTERVENTIONS:  - Monitor oral intake, urinary output, labs, and treatment plans  - Assess nutrition and hydration status and recommend course of action  - Evaluate amount of meals eaten  - Assist patient with eating if necessary   - Allow adequate time for meals  - Recommend/ encourage appropriate diets, oral nutritional supplements, and vitamin/mineral supplements  - Order, calculate, and assess calorie counts as needed  - Recommend, monitor, and adjust tube feedings and TPN/PPN based on assessed needs  - Assess need for intravenous fluids  - Provide specific nutrition/hydration education as appropriate  - Include patient/family/caregiver in decisions related to nutrition  Outcome: Progressing

## 2022-03-03 NOTE — ASSESSMENT & PLAN NOTE
Patient had acute on chronic hypoxic respiratory failure due to COVID pneumonia as evidenced by respiratory more than 25 and hypoxia requiring treatment with oxygen via mid flow at 10 liters/minute    She uses 2 liters/minute at home at night    Currently requiring 4-5 L of supplemental oxygen  Wean as tolerated

## 2022-03-03 NOTE — PLAN OF CARE
Problem: MOBILITY - ADULT  Goal: Maintain or return to baseline ADL function  Description: INTERVENTIONS:  -  Assess patient's ability to carry out ADLs; assess patient's baseline for ADL function and identify physical deficits which impact ability to perform ADLs (bathing, care of mouth/teeth, toileting, grooming, dressing, etc )  - Assess/evaluate cause of self-care deficits   - Assess range of motion  - Assess patient's mobility; develop plan if impaired  - Assess patient's need for assistive devices and provide as appropriate  - Encourage maximum independence but intervene and supervise when necessary  - Involve family in performance of ADLs  - Assess for home care needs following discharge   - Consider OT consult to assist with ADL evaluation and planning for discharge  - Provide patient education as appropriate  Outcome: Progressing  Goal: Maintains/Returns to pre admission functional level  Description: INTERVENTIONS:  - Perform BMAT or MOVE assessment daily    - Set and communicate daily mobility goal to care team and patient/family/caregiver  - Collaborate with rehabilitation services on mobility goals if consulted  - Perform Range of Motion 2 times a day  - Reposition patient every 2 hours    - Dangle patient 2 times a day  - Stand patient 2 times a day  - Ambulate patient 2 times a day  - Out of bed to chair 2 times a day   - Out of bed for meals 2 times a day  - Out of bed for toileting  - Record patient progress and toleration of activity level   Outcome: Progressing     Problem: Potential for Falls  Goal: Patient will remain free of falls  Description: INTERVENTIONS:  - Educate patient/family on patient safety including physical limitations  - Instruct patient to call for assistance with activity   - Consult OT/PT to assist with strengthening/mobility   - Keep Call bell within reach  - Keep bed low and locked with side rails adjusted as appropriate  - Keep care items and personal belongings within reach  - Initiate and maintain comfort rounds  - Make Fall Risk Sign visible to staff  - Offer Toileting every 2 Hours, in advance of need  - Initiate/Maintain bed alarm  - Obtain necessary fall risk management equipment: socks  - Apply yellow socks and bracelet for high fall risk patients  - Consider moving patient to room near nurses station  Outcome: Progressing     Problem: PAIN - ADULT  Goal: Verbalizes/displays adequate comfort level or baseline comfort level  Description: Interventions:  - Encourage patient to monitor pain and request assistance  - Assess pain using appropriate pain scale  - Administer analgesics based on type and severity of pain and evaluate response  - Implement non-pharmacological measures as appropriate and evaluate response  - Consider cultural and social influences on pain and pain management  - Notify physician/advanced practitioner if interventions unsuccessful or patient reports new pain  Outcome: Progressing     Problem: INFECTION - ADULT  Goal: Absence or prevention of progression during hospitalization  Description: INTERVENTIONS:  - Assess and monitor for signs and symptoms of infection  - Monitor lab/diagnostic results  - Monitor all insertion sites, i e  indwelling lines, tubes, and drains  - Monitor endotracheal if appropriate and nasal secretions for changes in amount and color  - [place] appropriate cooling/warming therapies per order  - Administer medications as ordered  - Instruct and encourage patient and family to use good hand hygiene technique  - Identify and instruct in appropriate isolation precautions for identified infection/condition  Outcome: Progressing  Goal: Absence of fever/infection during neutropenic period  Description: INTERVENTIONS:  - Monitor WBC    Outcome: Progressing     Problem: SAFETY ADULT  Goal: Maintain or return to baseline ADL function  Description: INTERVENTIONS:  -  Assess patient's ability to carry out ADLs; assess patient's baseline for ADL function and identify physical deficits which impact ability to perform ADLs (bathing, care of mouth/teeth, toileting, grooming, dressing, etc )  - Assess/evaluate cause of self-care deficits   - Assess range of motion  - Assess patient's mobility; develop plan if impaired  - Assess patient's need for assistive devices and provide as appropriate  - Encourage maximum independence but intervene and supervise when necessary  - Involve family in performance of ADLs  - Assess for home care needs following discharge   - Consider OT consult to assist with ADL evaluation and planning for discharge  - Provide patient education as appropriate  Outcome: Progressing  Goal: Maintains/Returns to pre admission functional level  Description: INTERVENTIONS:  - Perform BMAT or MOVE assessment daily    - Set and communicate daily mobility goal to care team and patient/family/caregiver  - Collaborate with rehabilitation services on mobility goals if consulted  - Perform Range of Motion 2 times a day  - Reposition patient every 2 hours    - Dangle patient 2 times a day  - Stand patient 2 times a day  - Ambulate patient 2 times a day  - Out of bed to chair 2 times a day   - Out of bed for meals 2 times a day  - Out of bed for toileting  - Record patient progress and toleration of activity level   Outcome: Progressing  Goal: Patient will remain free of falls  Description: INTERVENTIONS:  - Educate patient/family on patient safety including physical limitations  - Instruct patient to call for assistance with activity   - Consult OT/PT to assist with strengthening/mobility   - Keep Call bell within reach  - Keep bed low and locked with side rails adjusted as appropriate  - Keep care items and personal belongings within reach  - Initiate and maintain comfort rounds  - Make Fall Risk Sign visible to staff  - Offer Toileting every 2 Hours, in advance of need  - Initiate/Maintain bed alarm  - Obtain necessary fall risk management equipment: socks  - Apply yellow socks and bracelet for high fall risk patients  - Consider moving patient to room near nurses station  Outcome: Progressing     Problem: DISCHARGE PLANNING  Goal: Discharge to home or other facility with appropriate resources  Description: INTERVENTIONS:  - Identify barriers to discharge w/patient and caregiver  - Arrange for needed discharge resources and transportation as appropriate  - Identify discharge learning needs (meds, wound care, etc )  - Arrange for interpretive services to assist at discharge as needed  - Refer to Case Management Department for coordinating discharge planning if the patient needs post-hospital services based on physician/advanced practitioner order or complex needs related to functional status, cognitive ability, or social support system  Outcome: Progressing     Problem: Prexisting or High Potential for Compromised Skin Integrity  Goal: Skin integrity is maintained or improved  Description: INTERVENTIONS:  - Identify patients at risk for skin breakdown  - Assess and monitor skin integrity  - Assess and monitor nutrition and hydration status  - Monitor labs   - Assess for incontinence   - Turn and reposition patient  - Assist with mobility/ambulation  - Relieve pressure over bony prominences  - Avoid friction and shearing  - Provide appropriate hygiene as needed including keeping skin clean and dry  - Evaluate need for skin moisturizer/barrier cream  - Collaborate with interdisciplinary team   - Patient/family teaching  - Consider wound care consult   Outcome: Progressing

## 2022-03-03 NOTE — DISCHARGE SUMMARY
[facility]  Discharge- Giuliana Ibarra 1934, 80 y o  female MRN: [id number]  Unit/Bed#: -01 Encounter: [id number]  Primary Care Provider: Mayco Nickerson DO   Date and time admitted to hospital: 2/20/2022 11:41 AM    Pressure injury of sacral region, stage 2 [facility])  Assessment & Plan  Patient has stage II sacral pressure ulcer present on admission without signs of cellulitis  Continue local care, frequent repositioning  Acute on chronic respiratory failure with hypoxia [facility])  Assessment & Plan  Patient had acute on chronic hypoxic respiratory failure due to COVID pneumonia as evidenced by respiratory more than 25 and hypoxia requiring treatment with oxygen via mid flow at 10 liters/minute  She uses 2 liters/minute at home at night    Currently requiring 4-5 L of supplemental oxygen  Wean as tolerated      Sepsis [facility])  Assessment & Plan  Patient met criteria for sepsis admission as evidenced by respiratory more than 20, heart rate more than 100, leukocytosis more than 10K due to COVID pneumonia  Blood cultures showed no growth so far    Severe protein-calorie malnutrition (HCC)  Assessment & Plan  Malnutrition Findings:   Adult Malnutrition type: Chronic illness  Adult Degree of Malnutrition: Other severe protein calorie malnutrition (Pt presents with severe protein calorie malnutrition as evidenced by sunken orbitals, prominent clavicel bone and temporal scooping  Treat with Regular diet and Ensure QID )    BMI Findings: Body mass index is 19 59 kg/m²  · Dietary supplemenets     Patient has severe protein calorie malnutrition  She has diffuse atrophy of the muscles of the extremities and temporal atrophy  Continue chocolate Ensure nutritional supplements    Lymphoma ([address] )  Assessment & Plan  Patient's history of lymphoma  Patient has increase in her leukocyte count today likely due to steroids  She has anemia without signs of GI  bleeding      Will monitor blood counts    COPD (chronic obstructive pulmonary disease) [facility])  Assessment & Plan  Patient has chronic COPD  She had no COPD exacerbation to the hospital stay  Mixed hyperlipidemia  Assessment & Plan  · Continue Lipitor    * Pneumonia due to COVID-19 virus  Assessment & Plan  Patient was admitted with bilateral COVID pneumonia requiring oxygen via nasal cannula   Currently on 4-5 L of supplemental oxygen  Patient clinically looks better, more awake, less hard of hearing  Continue baricitinib  Completed 10 day course of dexamethasone  She completed IV remdesivir  Continue IV heparin for DVT prevention  Encourage incentive spirometer use  Patient is refusing to get out of bed to chair  P T /OT re-evaluation  CRP is 44 2  Palliative care is on board and spoke with daughter and they are not interested in going to rehab  Patient wants to go home and palliative care discussed with patient and family at length and they requested transitioning patient to home hospice  Hospice was consulted and patient will be discharged home with home hospice  This writer discussed with Granddaughter and family at length and they are aware about poor prognosis and would like patient to be home with home hospice  Patient will be discharged with home oxygen  Encouraged assisted feeding and oral hydration      Hospital Course:     Andriy Salazar is a 80 y o  female patient who originally presented to the hospital on   Admission Orders (From admission, onward)     Ordered        02/20/22 1324  Inpatient Admission  Once                     due to shortness of breath  Patient was found to have COVID-19 in ER  It patient was also noticed to have acute on chronic respiratory failure with hypoxia and was requiring 5-6 L of supplemental oxygen  Patient was started on COVID pathway with Rocephin, doxycycline, dexamethasone and remdesivir  Patient also seen initiated on baricitinib    Patient completed the course of remdesivir, dexamethasone during this hospitalization  Patient was started on heparin drip for anticoagulation given elevated D-dimer  Patient was seen by palliative care and code status was switched to level 3  Patient oxygen requirement went up to 10 L during this hospitalization  Patient was slowly weaned down to 4-5 L of supplemental oxygen  Patient is not eating well and refusing to get out of bed  Patient is refusing to go to rehab  Patient also states that she does not want to come back to the hospital   Palliative care [facility] and daughter Lara and they did not want patient to go to rehab and they discussed with family and requested transition to home hospice  Patient was seen by Physical therapy and  and will be discharged home with home hospice  Patient sodium on discharge is 126 and Discussed with patient [facility] at length and she is aware about patient's poor prognosis and requested patient to be discharged with home hospice  On exam  Chest-bilateral air entry, decreased at bases  Heart-S1-S2 regular  Abdomen-soft, nontender  Neuro-alert awake oriented x3  No focal deficits    Please see above list of diagnoses and related plan for additional information  Follow-up with PCP as outpatient  Continue rest of the management as per home hospice team    Condition at Discharge:  good      Discharge instructions/Information to patient and family:   See after visit summary for information provided to patient and family  Provisions for Follow-Up Care:  See after visit summary for information related to follow-up care and any pertinent home health orders  Disposition:     Home with home hospice       Discharge Statement:  I spent 45 minutes discharging the patient  This time was spent on the day of discharge  I had direct contact with the patient on the day of discharge   Greater than 50% of the total time was spent examining patient, answering all patient questions, arranging and discussing plan of care with patient as well as directly providing post-discharge instructions  Additional time then spent on discharge activities  Discharge Medications:  See after visit summary for reconciled discharge medications provided to patient and family        ** Please Note: This note has been constructed using a voice recognition system **

## 2022-03-03 NOTE — DISCHARGE INSTR - AVS FIRST PAGE
Follow-up with PCP as outpatient  Continue to quarantine as per CDC guidelines  Continue rest of the management as per home hospice team  [address]    Your healthcare provider and/or public health staff have evaluated you and have determined that you do not need to remain in the hospital at this time  At this time you can be isolated at home where you will be monitored by staff from your local or state health department  You should carefully follow the prevention and isolation steps below until a healthcare provider or local or state health department says that you can return to your normal activities  Stay home except to get medical care    People who are mildly ill with COVID-19 are able to isolate at home during their illness  You should restrict activities outside your home, except for getting medical care  Do not go to work, school, or public areas  Avoid using public transportation, ride-sharing, or taxis  Separate yourself from other people and animals in your home    People: As much as possible, you should stay in a specific room and away from other people in your home  Also, you should use a separate bathroom, if available  Animals: You should restrict contact with pets and other animals while you are sick with COVID-19, just like you would around other people  Although there have not been reports of pets or other animals becoming sick with COVID-19, it is still recommended that people sick with COVID-19 limit contact with animals until more information is known about the virus  When possible, have another member of your household care for your animals while you are sick  If you are sick with COVID-19, avoid contact with your pet, including petting, snuggling, being kissed or licked, and sharing food  If you must care for your pet or be around animals while you are sick, wash your hands before and after you interact with pets and wear a facemask   See COVID-19 and Animals for more information  Call ahead before visiting your doctor    If you have a medical appointment, call the healthcare provider and tell them that you have or may have COVID-19  This will help the healthcare providers office take steps to keep other people from getting infected or exposed  Wear a facemask    You should wear a facemask when you are around other people (e g , sharing a room or vehicle) or pets and before you enter a healthcare providers office  If you are not able to wear a facemask (for example, because it causes trouble breathing), then people who live with you should not stay in the same room with you, or they should wear a facemask if they enter your room  Cover your coughs and sneezes    Cover your mouth and nose with a tissue when you cough or sneeze  Throw used tissues in a lined trash can  Immediately wash your hands with soap and water for at least 20 seconds or, if soap and water are not available, clean your hands with an alcohol-based hand  that contains at least 60% alcohol  Clean your hands often    Wash your hands often with soap and water for at least 20 seconds, especially after blowing your nose, coughing, or sneezing; going to the bathroom; and before eating or preparing food  If soap and water are not readily available, use an alcohol-based hand  with at least 60% alcohol, covering all surfaces of your hands and rubbing them together until they feel dry  Soap and water are the best option if hands are visibly dirty  Avoid touching your eyes, nose, and mouth with unwashed hands  Avoid sharing personal household items    You should not share dishes, drinking glasses, cups, eating utensils, towels, or bedding with other people or pets in your home  After using these items, they should be washed thoroughly with soap and water      Clean all high-touch surfaces everyday    High touch surfaces include counters, tabletops, doorknobs, bathroom fixtures, toilets, phones, keyboards, tablets, and bedside tables  Also, clean any surfaces that may have blood, stool, or body fluids on them  Use a household cleaning spray or wipe, according to the label instructions  Labels contain instructions for safe and effective use of the cleaning product including precautions you should take when applying the product, such as wearing gloves and making sure you have good ventilation during use of the product  Monitor your symptoms    Seek prompt medical attention if your illness is worsening (e g , difficulty breathing)  Before seeking care, call your healthcare provider and tell them that you have, or are being evaluated for, COVID-19  Put on a facemask before you enter the facility  These steps will help the healthcare providers office to keep other people in the office or waiting room from getting infected or exposed  Ask your healthcare provider to call the local or [facility] health department  Persons who are placed under active monitoring or facilitated self-monitoring should follow instructions provided by their local health department or occupational health professionals, as appropriate  If you have a medical emergency and need to call 911, notify the dispatch personnel that you have, or are being evaluated for COVID-19  If possible, put on a facemask before emergency medical services arrive  Discontinuing home isolation    Patients with confirmed COVID-19 should remain under home isolation precautions until the following conditions are met:    They have had no fever for at least 24 hours (that is one full day of no fever without the use medicine that reduces fevers)  AND  other symptoms have improved (for example, when their cough or shortness of breath have improved)  AND  If had mild or moderate illness, at least 10 days have passed since their symptoms first appeared or if severe illness (needed oxygen) or immunosuppressed, at least 20 days have passed since symptoms first appeared  Patients with confirmed COVID-19 should also notify close contacts (including their workplace) and ask that they self-quarantine  Currently, close contact is defined as being within 6 feet for 15 minutes or more from the period 24 hours starting 48 hours before symptom onset to the time at which the patient went into isolation  Close contacts of patients diagnosed with COVID-19 should be instructed by the patient to self-quarantine for 14 days from the last time of their last contact with the patient       Source: [employer]

## 2022-03-03 NOTE — CASE MANAGEMENT
Case Management Discharge Planning Note    Patient name Nino Bryant  Location /-08 MRN [id number]  : 1934 Date 3/3/2022       Current Admission Date: 2022  Current Admission Diagnosis:Pneumonia due to COVID-19 virus   Patient Active Problem List    Diagnosis Date Noted    Pressure injury of sacral region, stage 2 ([facility] ) 2022    Sepsis ([facility] ) 2022    Acute on chronic respiratory failure with hypoxia ([facility] ) 2022    Pneumonia due to COVID-19 virus 2022    Elevated brain natriuretic peptide (BNP) level 2021    Anemia 10/08/2021    Severe protein-calorie malnutrition ([facility] ) 10/08/2021    Depression 10/07/2021    Hypothyroidism 10/07/2021    Mixed hyperlipidemia 10/07/2021    COPD (chronic obstructive pulmonary disease) ([facility] ) 10/07/2021    Chronic respiratory failure with hypoxia ([facility] ) 10/07/2021    Hyponatremia 10/07/2021    Malnutrition ([facility] ) 10/07/2021    Lymphoma ([facility] ) 10/07/2021      LOS (days): 11  Geometric Mean LOS (GMLOS) (days): 4 80  Days to GMLOS:-6     OBJECTIVE:  Risk of Unplanned Readmission Score: 27     Current admission status: Inpatient   Preferred Pharmacy:   [address]  [address]  [facility] [address]  Phone: [phone] Fax: [phone]    Primary Care Provider: Rudy Byers DO    Primary Insurance: Olivier Hanley [facility] REP  Secondary Insurance:     DISCHARGE DETAILS:    Additional Comments: CM notified by [place] that ambulance transport time has changed from 12pm with [facility] to 1:15pm with [facility]  Informed [facility] liaison(Josué) of change in transport time  Call placed to Pt's granddtr(Kelli) and Pt's caregiver(Nirmala) to inform of change in transport time  Pt's nurse(Marie) informed of change in transport time

## 2022-03-03 NOTE — NURSING NOTE
Patient discharged to home via ambulance  Granddaughter called and updated- discharge instructions provided

## 2022-03-03 NOTE — ASSESSMENT & PLAN NOTE
Patient was admitted with bilateral COVID pneumonia requiring oxygen via nasal cannula   Currently on 4-5 L of supplemental oxygen  Patient clinically looks better, more awake, less hard of hearing  Continue baricitinib  Completed 10 day course of dexamethasone  She completed IV remdesivir  Continue IV heparin for DVT prevention  Encourage incentive spirometer use  Patient is refusing to get out of bed to chair  P T /OT re-evaluation  CRP is 44 2  Palliative care is on board and spoke with daughter and they are not interested in going to rehab  Patient wants to go home and palliative care discussed with patient and family at length and they requested transitioning patient to home hospice  Hospice was consulted and patient will be discharged home with home hospice  This writer discussed with Granddaughter and family at length and they are aware about poor prognosis and would like patient to be home with home hospice    Patient will be discharged with home oxygen  Encouraged assisted feeding and oral hydration

## 2022-03-03 NOTE — OCCUPATIONAL THERAPY NOTE
Occupational Therapy Discharge        Patient Name: Gabby CUMMINSZ Date: 3/3/2022       03/03/22 1209   OT Last Visit   OT Visit Date 03/03/22   Note Type   Note type Cancelled Session   Additional Comments Pt transitioning to hospice at this time  OT will sign off          ENEDINA Carbajal, OTR/L

## 2022-03-04 NOTE — UTILIZATION REVIEW
Notification of Discharge   This is a Notification of Discharge from our facility [address]  Please be advised that this patient has been discharge from our facility  Below you will find the admission and discharge date and time including the patients disposition  UTILIZATION REVIEW CONTACT:  Julissa Enamorado  Utilization   Network Utilization Review Department  Phone: [phone] carefully listen to the prompts  All voicemails are confidential   Email: [email or web address]     PHYSICIAN ADVISORY SERVICES:  FOR [facility] REVIEW - MEDICAL NECESSITY DENIAL  Phone: [phone]  Fax: [phone]  Email: [email or web address]     PRESENTATION DATE: 2/20/2022 11:41 AM  OBERVATION ADMISSION DATE:   INPATIENT ADMISSION DATE: 2/20/22  1:24 PM   DISCHARGE DATE: 3/3/2022  3:46 PM  DISPOSITION: Home with [address] with Hospice Care      IMPORTANT INFORMATION:  Send all requests for admission clinical reviews, approved or denied determinations and any other requests to dedicated fax number below belonging to the campus where the patient is receiving treatment   List of dedicated fax numbers:  [address] DENIALS (Administrative/Medical Necessity) [phone]   [address] (Maternity/NICU/Pediatrics) [phone]   [facility] [phone]   [facility] [phone]   Emili Kan [phone]   [address] [place] [address],[address] [phone]   [facility]  [phone]   [address] [place] [address], [address]  [address] [facility] [address] [facility] [phone]

## 2022-12-05 NOTE — PLAN OF CARE
Problem: Depression  Goal: Will be euthymic at discharge  Description: INTERVENTIONS:  1. Administer medication as ordered  2. Provide emotional support via 1:1 interaction with staff  3. Encourage involvement in milieu/groups/activities  4. Monitor for social isolation  12/5/2022 1032 by Prema Mcfarland RN  Outcome: Progressing     Problem: Behavior  Goal: Pt/Family maintain appropriate behavior and adhere to behavioral management agreement, if implemented  Description: INTERVENTIONS:  1. Assess patient/family's coping skills and  non-compliant behavior (including use of illegal substances)  2. Notify security of behavior or suspected illegal substances which indicate the need for search of the family and/or belongings  3. Encourage verbalization of thoughts and concerns in a socially appropriate manner  4. Utilize positive, consistent limit setting strategies supporting safety of patient, staff and others  5. Encourage participation in the decision making process about the behavioral management agreement  6. If a visitor's behavior poses a threat to safety call refer to organization policy. 7. Initiate consult with , Psychosocial CNS, Spiritual Care as appropriate  12/5/2022 1032 by Prema Mcfarland RN  Outcome: Progressing     Problem: Anxiety  Goal: Will report anxiety at manageable levels  Description: INTERVENTIONS:  1. Administer medication as ordered  2. Teach and rehearse alternative coping skills  3. Provide emotional support with 1:1 interaction with staff  12/5/2022 1032 by Prema Mcfarland RN  Outcome: Progressing     Problem: Sleep Disturbance  Goal: Will exhibit normal sleeping pattern  Description: INTERVENTIONS:  1. Administer medication as ordered  2. Decrease environmental stimuli, including noise, as appropriate  3.  Discourage social isolation and naps during the day  12/5/2022 1032 by Prema Mcfarland RN  Outcome: Progressing     Problem: Involuntary Admit  Goal: Will cooperate with Problem: MOBILITY - ADULT  Goal: Maintain or return to baseline ADL function  Description: INTERVENTIONS:  -  Assess patient's ability to carry out ADLs; assess patient's baseline for ADL function and identify physical deficits which impact ability to perform ADLs (bathing, care of mouth/teeth, toileting, grooming, dressing, etc )  - Assess/evaluate cause of self-care deficits   - Assess range of motion  - Assess patient's mobility; develop plan if impaired  - Assess patient's need for assistive devices and provide as appropriate  - Encourage maximum independence but intervene and supervise when necessary  - Involve family in performance of ADLs  - Assess for home care needs following discharge   - Consider OT consult to assist with ADL evaluation and planning for discharge  - Provide patient education as appropriate  Outcome: Progressing  Goal: Maintains/Returns to pre admission functional level  Description: INTERVENTIONS:  - Perform BMAT or MOVE assessment daily    - Set and communicate daily mobility goal to care team and patient/family/caregiver  - Collaborate with rehabilitation services on mobility goals if consulted  - Perform Range of Motion 2 times a day  - Reposition patient every 2 hours    - Dangle patient 2 times a day  - Stand patient 2 times a day  - Ambulate patient 2 times a day  - Out of bed to chair 2 times a day   - Out of bed for meals 2 times a day  - Out of bed for toileting  - Record patient progress and toleration of activity level   Outcome: Progressing     Problem: Potential for Falls  Goal: Patient will remain free of falls  Description: INTERVENTIONS:  - Educate patient/family on patient safety including physical limitations  - Instruct patient to call for assistance with activity   - Consult OT/PT to assist with strengthening/mobility   - Keep Call bell within reach  - Keep bed low and locked with side rails adjusted as appropriate  - Keep care items and personal belongings within reach  - Initiate and maintain comfort rounds  - Make Fall Risk Sign visible to staff  - Offer Toileting every 2 Hours, in advance of need  - Initiate/Maintain bed alarm  - Obtain necessary fall risk management equipment: socks  - Apply yellow socks and bracelet for high fall risk patients  - Consider moving patient to room near nurses station  Outcome: Progressing     Problem: PAIN - ADULT  Goal: Verbalizes/displays adequate comfort level or baseline comfort level  Description: Interventions:  - Encourage patient to monitor pain and request assistance  - Assess pain using appropriate pain scale  - Administer analgesics based on type and severity of pain and evaluate response  - Implement non-pharmacological measures as appropriate and evaluate response  - Consider cultural and social influences on pain and pain management  - Notify physician/advanced practitioner if interventions unsuccessful or patient reports new pain  Outcome: Progressing     Problem: INFECTION - ADULT  Goal: Absence or prevention of progression during hospitalization  Description: INTERVENTIONS:  - Assess and monitor for signs and symptoms of infection  - Monitor lab/diagnostic results  - Monitor all insertion sites, i e  indwelling lines, tubes, and drains  - Monitor endotracheal if appropriate and nasal secretions for changes in amount and color  - [place] appropriate cooling/warming therapies per order  - Administer medications as ordered  - Instruct and encourage patient and family to use good hand hygiene technique  - Identify and instruct in appropriate isolation precautions for identified infection/condition  Outcome: Progressing  Goal: Absence of fever/infection during neutropenic period  Description: INTERVENTIONS:  - Monitor WBC    Outcome: Progressing     Problem: SAFETY ADULT  Goal: Maintain or return to baseline ADL function  Description: INTERVENTIONS:  -  Assess patient's ability to carry out ADLs; assess patient's baseline for staff recommendations and doctor's orders and will demonstrate appropriate behavior  Description: INTERVENTIONS:  1. Treat underlying conditions and offer medication as ordered  2. Educate regarding involuntary admission procedures and rules  3. Contain excessive/inappropriate behavior per unit and hospital policies  [id number] by Christophe Dawson RN  Outcome: Progressing    Patient denies SI/HI and hallucinations. Patient is flat and blunted but brightens with conversation. Patient is mostly isolative to room and self but does attend provided meals. Patient takes prescribed medications without issue. Will continue to offer support and comfort to patient. ADL function and identify physical deficits which impact ability to perform ADLs (bathing, care of mouth/teeth, toileting, grooming, dressing, etc )  - Assess/evaluate cause of self-care deficits   - Assess range of motion  - Assess patient's mobility; develop plan if impaired  - Assess patient's need for assistive devices and provide as appropriate  - Encourage maximum independence but intervene and supervise when necessary  - Involve family in performance of ADLs  - Assess for home care needs following discharge   - Consider OT consult to assist with ADL evaluation and planning for discharge  - Provide patient education as appropriate  Outcome: Progressing  Goal: Maintains/Returns to pre admission functional level  Description: INTERVENTIONS:  - Perform BMAT or MOVE assessment daily    - Set and communicate daily mobility goal to care team and patient/family/caregiver  - Collaborate with rehabilitation services on mobility goals if consulted  - Perform Range of Motion 2 times a day  - Reposition patient every 2 hours    - Dangle patient 2 times a day  - Stand patient 2 times a day  - Ambulate patient 2 times a day  - Out of bed to chair 2 times a day   - Out of bed for meals 2 times a day  - Out of bed for toileting  - Record patient progress and toleration of activity level   Outcome: Progressing  Goal: Patient will remain free of falls  Description: INTERVENTIONS:  - Educate patient/family on patient safety including physical limitations  - Instruct patient to call for assistance with activity   - Consult OT/PT to assist with strengthening/mobility   - Keep Call bell within reach  - Keep bed low and locked with side rails adjusted as appropriate  - Keep care items and personal belongings within reach  - Initiate and maintain comfort rounds  - Make Fall Risk Sign visible to staff  - Offer Toileting every 2 Hours, in advance of need  - Initiate/Maintain bed alarm  - Obtain necessary fall risk management equipment: socks  - Apply yellow socks and bracelet for high fall risk patients  - Consider moving patient to room near nurses station  Outcome: Progressing     Problem: DISCHARGE PLANNING  Goal: Discharge to home or other facility with appropriate resources  Description: INTERVENTIONS:  - Identify barriers to discharge w/patient and caregiver  - Arrange for needed discharge resources and transportation as appropriate  - Identify discharge learning needs (meds, wound care, etc )  - Arrange for interpretive services to assist at discharge as needed  - Refer to Case Management Department for coordinating discharge planning if the patient needs post-hospital services based on physician/advanced practitioner order or complex needs related to functional status, cognitive ability, or social support system  Outcome: Progressing     Problem: Knowledge Deficit  Goal: Patient/family/caregiver demonstrates understanding of disease process, treatment plan, medications, and discharge instructions  Description: Complete learning assessment and assess knowledge base    Interventions:  - Provide teaching at level of understanding  - Provide teaching via preferred learning methods  Outcome: Progressing     Problem: Prexisting or High Potential for Compromised Skin Integrity  Goal: Skin integrity is maintained or improved  Description: INTERVENTIONS:  - Identify patients at risk for skin breakdown  - Assess and monitor skin integrity  - Assess and monitor nutrition and hydration status  - Monitor labs   - Assess for incontinence   - Turn and reposition patient  - Assist with mobility/ambulation  - Relieve pressure over bony prominences  - Avoid friction and shearing  - Provide appropriate hygiene as needed including keeping skin clean and dry  - Evaluate need for skin moisturizer/barrier cream  - Collaborate with interdisciplinary team   - Patient/family teaching  - Consider wound care consult   Outcome: Progressing     Problem: Nutrition/Hydration-ADULT  Goal: Nutrient/Hydration intake appropriate for improving, restoring or maintaining nutritional needs  Description: Monitor and assess patient's nutrition/hydration status for malnutrition  Collaborate with interdisciplinary team and initiate plan and interventions as ordered  Monitor patient's weight and dietary intake as ordered or per policy  Utilize nutrition screening tool and intervene as necessary  Determine patient's food preferences and provide high-protein, high-caloric foods as appropriate       INTERVENTIONS:  - Monitor oral intake, urinary output, labs, and treatment plans  - Assess nutrition and hydration status and recommend course of action  - Evaluate amount of meals eaten  - Assist patient with eating if necessary   - Allow adequate time for meals  - Recommend/ encourage appropriate diets, oral nutritional supplements, and vitamin/mineral supplements  - Order, calculate, and assess calorie counts as needed  - Recommend, monitor, and adjust tube feedings and TPN/PPN based on assessed needs  - Assess need for intravenous fluids  - Provide specific nutrition/hydration education as appropriate  - Include patient/family/caregiver in decisions related to nutrition  Outcome: Progressing